# Patient Record
Sex: MALE | Race: WHITE | NOT HISPANIC OR LATINO | Employment: OTHER | URBAN - METROPOLITAN AREA
[De-identification: names, ages, dates, MRNs, and addresses within clinical notes are randomized per-mention and may not be internally consistent; named-entity substitution may affect disease eponyms.]

---

## 2017-03-08 ENCOUNTER — HOSPITAL ENCOUNTER (OUTPATIENT)
Dept: NON INVASIVE DIAGNOSTICS | Facility: CLINIC | Age: 72
Discharge: HOME | End: 2017-03-08
Payer: COMMERCIAL

## 2017-03-08 DIAGNOSIS — I10 ESSENTIAL (PRIMARY) HYPERTENSION: ICD-10-CM

## 2017-03-08 DIAGNOSIS — I35.9 NONRHEUMATIC AORTIC VALVE DISORDER: ICD-10-CM

## 2017-03-08 DIAGNOSIS — I48.91 ATRIAL FIBRILLATION (HCC): ICD-10-CM

## 2017-03-08 DIAGNOSIS — E78.5 HYPERLIPIDEMIA: ICD-10-CM

## 2017-03-08 PROCEDURE — 93306 TTE W/DOPPLER COMPLETE: CPT

## 2017-03-29 DIAGNOSIS — I42.9 CARDIOMYOPATHY (HCC): ICD-10-CM

## 2017-03-29 DIAGNOSIS — E78.5 HYPERLIPIDEMIA: ICD-10-CM

## 2017-04-12 ENCOUNTER — TRANSCRIBE ORDERS (OUTPATIENT)
Dept: LAB | Facility: HOSPITAL | Age: 72
End: 2017-04-12

## 2017-04-12 ENCOUNTER — APPOINTMENT (OUTPATIENT)
Dept: LAB | Facility: HOSPITAL | Age: 72
End: 2017-04-12
Attending: INTERNAL MEDICINE
Payer: COMMERCIAL

## 2017-04-12 DIAGNOSIS — E78.5 HYPERLIPIDEMIA: ICD-10-CM

## 2017-04-12 DIAGNOSIS — I42.9 CARDIOMYOPATHY (HCC): ICD-10-CM

## 2017-04-12 LAB
ALBUMIN SERPL BCP-MCNC: 3.5 G/DL (ref 3.5–5)
ALP SERPL-CCNC: 60 U/L (ref 46–116)
ALT SERPL W P-5'-P-CCNC: 25 U/L (ref 12–78)
AST SERPL W P-5'-P-CCNC: 16 U/L (ref 5–45)
BILIRUB DIRECT SERPL-MCNC: 0.16 MG/DL (ref 0–0.2)
BILIRUB SERPL-MCNC: 0.55 MG/DL (ref 0.2–1)
CHOLEST SERPL-MCNC: 208 MG/DL (ref 50–200)
HDLC SERPL-MCNC: 63 MG/DL (ref 40–60)
LDLC SERPL CALC-MCNC: 123 MG/DL (ref 0–100)
PROT SERPL-MCNC: 7 G/DL (ref 6.4–8.2)
TRIGL SERPL-MCNC: 112 MG/DL

## 2017-04-12 PROCEDURE — 36415 COLL VENOUS BLD VENIPUNCTURE: CPT

## 2017-04-12 PROCEDURE — 80061 LIPID PANEL: CPT

## 2017-04-12 PROCEDURE — 80076 HEPATIC FUNCTION PANEL: CPT

## 2017-05-01 ENCOUNTER — ALLSCRIPTS OFFICE VISIT (OUTPATIENT)
Dept: OTHER | Facility: OTHER | Age: 72
End: 2017-05-01

## 2017-05-01 ENCOUNTER — TRANSCRIBE ORDERS (OUTPATIENT)
Dept: ADMINISTRATIVE | Facility: HOSPITAL | Age: 72
End: 2017-05-01

## 2017-05-01 DIAGNOSIS — I34.9 NONRHEUMATIC MITRAL VALVE DISORDER: Primary | ICD-10-CM

## 2017-05-01 DIAGNOSIS — I42.9 FAMILIAL CARDIOMYOPATHY (HCC): ICD-10-CM

## 2018-01-12 VITALS
HEIGHT: 66 IN | SYSTOLIC BLOOD PRESSURE: 124 MMHG | WEIGHT: 165.44 LBS | BODY MASS INDEX: 26.59 KG/M2 | HEART RATE: 67 BPM | DIASTOLIC BLOOD PRESSURE: 82 MMHG

## 2018-01-31 DIAGNOSIS — I15.9 SECONDARY HYPERTENSION: Primary | ICD-10-CM

## 2018-01-31 RX ORDER — AMLODIPINE BESYLATE 10 MG/1
10 TABLET ORAL 2 TIMES DAILY
Qty: 180 TABLET | Refills: 3 | Status: SHIPPED | OUTPATIENT
Start: 2018-01-31 | End: 2018-11-29 | Stop reason: SDUPTHER

## 2018-01-31 RX ORDER — AMLODIPINE BESYLATE 10 MG/1
1 TABLET ORAL 2 TIMES DAILY
COMMUNITY
Start: 2016-02-16 | End: 2018-01-31 | Stop reason: SDUPTHER

## 2018-02-13 ENCOUNTER — OFFICE VISIT (OUTPATIENT)
Dept: FAMILY MEDICINE CLINIC | Facility: CLINIC | Age: 73
End: 2018-02-13
Payer: COMMERCIAL

## 2018-02-13 VITALS
WEIGHT: 166 LBS | BODY MASS INDEX: 27.66 KG/M2 | HEIGHT: 65 IN | DIASTOLIC BLOOD PRESSURE: 72 MMHG | SYSTOLIC BLOOD PRESSURE: 126 MMHG | HEART RATE: 83 BPM | TEMPERATURE: 97.7 F | OXYGEN SATURATION: 98 %

## 2018-02-13 DIAGNOSIS — Z00.00 ENCOUNTER FOR PREVENTIVE HEALTH EXAMINATION: Primary | ICD-10-CM

## 2018-02-13 DIAGNOSIS — E55.9 VITAMIN D DEFICIENCY: ICD-10-CM

## 2018-02-13 DIAGNOSIS — I48.20 CHRONIC ATRIAL FIBRILLATION (HCC): ICD-10-CM

## 2018-02-13 DIAGNOSIS — N42.9 ABNORMAL PROSTATE BY PALPATION: ICD-10-CM

## 2018-02-13 PROCEDURE — G0438 PPPS, INITIAL VISIT: HCPCS | Performed by: FAMILY MEDICINE

## 2018-02-13 RX ORDER — ATENOLOL 25 MG/1
TABLET ORAL
COMMUNITY
Start: 2017-01-03 | End: 2018-03-19 | Stop reason: SDUPTHER

## 2018-02-13 RX ORDER — NIACIN 500 MG
1 TABLET ORAL DAILY
COMMUNITY
End: 2019-04-01 | Stop reason: ALTCHOICE

## 2018-02-13 RX ORDER — CLONIDINE HYDROCHLORIDE 0.1 MG/1
1 TABLET ORAL 2 TIMES DAILY
COMMUNITY
Start: 2017-01-27 | End: 2019-01-22 | Stop reason: SDUPTHER

## 2018-02-13 RX ORDER — ATENOLOL 100 MG/1
TABLET ORAL
Refills: 0 | COMMUNITY
Start: 2018-01-12 | End: 2018-04-10 | Stop reason: SDUPTHER

## 2018-02-13 NOTE — PROGRESS NOTES
Assessment/Plan: new patient here today to establish care     No problem-specific Assessment & Plan notes found for this encounter  1  Encounter for preventive health examination  PSA, total and free   2  Abnormal prostate by palpation  Ambulatory referral to Urology    PSA, total and free   3  Vitamin D deficiency  Vitamin D 25 hydroxy   4  Chronic atrial fibrillation (HCC)          There are no diagnoses linked to this encounter  Subjective:      Patient ID: Kim Huber is a 81 y o  male  First visit  Follows with Cardiology for A-Fib  Had [facility] ago [facility], repeat in 10 years  Due for prostate exam         The following portions of the patient's history were reviewed and updated as appropriate: allergies, current medications, past family history, past medical history, past social history, past surgical history and problem list     Review of Systems   Constitutional: Negative  HENT: Negative  Eyes: Negative  Respiratory: Negative  Cardiovascular: Negative  Gastrointestinal: Negative  Genitourinary: Negative  Musculoskeletal: Negative  Skin: Negative  Neurological: Negative  Psychiatric/Behavioral: Negative  Objective:    Vitals:    02/13/18 0812   BP: 126/72   Pulse: 83   Temp: 97 7 °F (36 5 °C)   SpO2: 98%        Physical Exam   Constitutional: He appears well-developed and well-nourished  HENT:   Head: Normocephalic and atraumatic  Eyes: Conjunctivae are normal  Pupils are equal, round, and reactive to light  Neck: Normal range of motion  Cardiovascular: Normal rate, normal heart sounds and intact distal pulses  Irregular - irregularity  Pulmonary/Chest: Effort normal and breath sounds normal    Abdominal: Soft  Bowel sounds are normal    Genitourinary:   Genitourinary Comments: Prostate larger on right side and slight firm  Skin: Skin is warm and dry  Psychiatric: He has a normal mood and affect   His behavior is normal  Judgment and thought content normal

## 2018-02-15 ENCOUNTER — TELEPHONE (OUTPATIENT)
Dept: CARDIOLOGY CLINIC | Facility: CLINIC | Age: 73
End: 2018-02-15

## 2018-02-15 DIAGNOSIS — E78.5 HYPERLIPIDEMIA, UNSPECIFIED HYPERLIPIDEMIA TYPE: Primary | ICD-10-CM

## 2018-02-15 NOTE — TELEPHONE ENCOUNTER
Gemma Hous[place] called to say that he is getting blood work done for his PCP and he noticed that a Lipid Panel was not ordered  He wanted to know if you would like to order a lipid panel prior to his appt with you on 3/6/18?

## 2018-02-22 ENCOUNTER — APPOINTMENT (OUTPATIENT)
Dept: LAB | Facility: HOSPITAL | Age: 73
End: 2018-02-22
Payer: COMMERCIAL

## 2018-02-22 DIAGNOSIS — E78.5 HYPERLIPIDEMIA, UNSPECIFIED HYPERLIPIDEMIA TYPE: ICD-10-CM

## 2018-02-22 DIAGNOSIS — E55.9 VITAMIN D DEFICIENCY: ICD-10-CM

## 2018-02-22 DIAGNOSIS — N42.9 ABNORMAL PROSTATE BY PALPATION: ICD-10-CM

## 2018-02-22 DIAGNOSIS — Z00.00 ENCOUNTER FOR PREVENTIVE HEALTH EXAMINATION: ICD-10-CM

## 2018-02-22 LAB
25(OH)D3 SERPL-MCNC: 20.5 NG/ML (ref 30–100)
CHOLEST SERPL-MCNC: 199 MG/DL (ref 50–200)
HDLC SERPL-MCNC: 70 MG/DL (ref 40–60)
LDLC SERPL CALC-MCNC: 112 MG/DL (ref 0–100)
TRIGL SERPL-MCNC: 85 MG/DL

## 2018-02-22 PROCEDURE — 84154 ASSAY OF PSA FREE: CPT

## 2018-02-22 PROCEDURE — 84153 ASSAY OF PSA TOTAL: CPT

## 2018-02-22 PROCEDURE — 82306 VITAMIN D 25 HYDROXY: CPT

## 2018-02-22 PROCEDURE — 80061 LIPID PANEL: CPT

## 2018-02-22 PROCEDURE — 36415 COLL VENOUS BLD VENIPUNCTURE: CPT

## 2018-02-23 LAB
PSA FREE MFR SERPL: 38.6 %
PSA FREE SERPL-MCNC: 0.85 NG/ML
PSA SERPL-MCNC: 2.2 NG/ML (ref 0–4)

## 2018-03-01 DIAGNOSIS — I35.9 NONRHEUMATIC AORTIC VALVE DISORDER: ICD-10-CM

## 2018-03-01 DIAGNOSIS — I10 ESSENTIAL (PRIMARY) HYPERTENSION: ICD-10-CM

## 2018-03-01 DIAGNOSIS — I48.91 ATRIAL FIBRILLATION (HCC): ICD-10-CM

## 2018-03-01 DIAGNOSIS — E78.5 HYPERLIPIDEMIA: ICD-10-CM

## 2018-03-06 ENCOUNTER — OFFICE VISIT (OUTPATIENT)
Dept: UROLOGY | Facility: CLINIC | Age: 73
End: 2018-03-06
Payer: COMMERCIAL

## 2018-03-06 VITALS
BODY MASS INDEX: 27.49 KG/M2 | HEART RATE: 75 BPM | HEIGHT: 65 IN | DIASTOLIC BLOOD PRESSURE: 73 MMHG | WEIGHT: 165 LBS | SYSTOLIC BLOOD PRESSURE: 120 MMHG

## 2018-03-06 DIAGNOSIS — N42.9 ABNORMAL PROSTATE ON PHYSICAL EXAMINATION: Primary | ICD-10-CM

## 2018-03-06 DIAGNOSIS — D40.0 NEOPLASM OF UNCERTAIN BEHAVIOR OF PROSTATE: ICD-10-CM

## 2018-03-06 LAB
SL AMB  POCT GLUCOSE, UA: ABNORMAL
SL AMB LEUKOCYTE ESTERASE,UA: ABNORMAL
SL AMB POCT BLOOD,UA: ABNORMAL
SL AMB POCT CLARITY,UA: CLEAR
SL AMB POCT COLOR,UA: YELLOW
SL AMB POCT KETONES,UA: ABNORMAL
SL AMB POCT NITRITE,UA: ABNORMAL
SL AMB POCT PH,UA: 5
SL AMB POCT URINE PROTEIN: ABNORMAL

## 2018-03-06 PROCEDURE — 81002 URINALYSIS NONAUTO W/O SCOPE: CPT | Performed by: UROLOGY

## 2018-03-06 PROCEDURE — 99203 OFFICE O/P NEW LOW 30 MIN: CPT | Performed by: UROLOGY

## 2018-03-06 NOTE — LETTER
March 6, 2018     Olga Pichardo DO  [address]    Patient: Danyelle Dang   YOB: 1945   Date of Visit: 3/6/2018       Dear Dr Kaylah Pinedo: Thank you for referring Mayco Cadena to me for evaluation  Below are my notes for this consultation  If you have questions, please do not hesitate to call me  I look forward to following your patient along with you  Sincerely,        Valentina Trevino MD        CC: No Recipients  Valentina Trevino MD  3/6/2018  1:12 PM  Sign at close encounter  Progress Note - Urology  Danyelle Dang 81 y o  male MRN: [id number]  Encounter: [id number]      Chief Complaint:   Chief Complaint   Patient presents with    Other     New Patient- Abnormal Prostate Exam w/ PCP       HPI:    77-year-old male who presents for evaluation of an abnormal prostate examination  He gives no family history of prostate disease  His current AUA index is 5  Most recent PSA is 2 2  His major issue in regard to lower tract symptoms is that of nocturia 2-3 times  He feels empty postvoid  He has had no UTI history  He has no bone pain  He has no weight loss  MEDS:    Current Outpatient Prescriptions:     amLODIPine (NORVASC) 10 mg tablet, Take 1 tablet (10 mg total) by mouth 2 (two) times a day, Disp: 180 tablet, Rfl: 3    Ascorbic Acid (VITAMIN C) 500 MG/5ML LIQD, Take 1 tablet by mouth daily, Disp: , Rfl:     atenolol (TENORMIN) 25 mg tablet, Take by mouth, Disp: , Rfl:     cloNIDine (CATAPRES) 0 1 mg tablet, Take 1 tablet by mouth 2 (two) times a day, Disp: , Rfl:     niacin 500 mg tablet, Take 1 tablet by mouth daily, Disp: , Rfl:     Omega-3 Fatty Acids (FISH OIL) 645 MG CAPS, Take 1 capsule by mouth daily, Disp: , Rfl:     PRADAXA 150 MG capsu, , Disp: , Rfl: 0      PMH:  Past Medical History:   Diagnosis Date    Atrial fibrillation (HCC)          ROS:  Review of Systems   Constitutional: Negative  Respiratory: Negative      Cardiovascular: Negative  Neurological: Negative  Psychiatric/Behavioral: Negative  Vitals:  Blood pressure 120/73, pulse 75, height 5' 5" (1 651 m), weight 74 8 kg (165 lb)  Physical Exam    This is a well-developed male appearing his stated age in no acute distress  There is no adenopathy noted  The back reveals no CVA tenderness  The abdomen is soft  There is no   Hepatomegaly  There is no splenomegaly  There is no abdominal mass  No tenderness  The bladder is not distended  There is no inguinal adenopathy  There is no hernia defect  The penis appears to be normal   Both testes are unremarkable  Rectal examination shows the prostate to be roughly 28 g  The right side is firm  The left side is otherwise unremarkable  Lower extremities show +1 edema to the knees    Lab, Imaging and other studies:  Recent Results (from the past 48 hour(s))   POCT urine dip    Collection Time: 03/06/18 11:05 AM   Result Value Ref Range    LEUKOCYTE ESTERASE,UA NEG      NITRITE,UA NEG     SL AMB POCT URINE PROTEIN +      PH,UA 5      BLOOD,UA NEG      KETONES,UA TRACE     GLUCOSE, UA NEG      COLOR,UA YELLOW      CLARITY,UA CLEAR          IMPRESSION:   abnormal digital rectal examination    PLAN:   options were reviewed with the patient  The 1st option is to simply recheck in 4-6 months  This would be a PSA and a digital rectal examination  The 2nd option is to proceed with biopsy and ultrasound  I did review the risk and complications with this including infection and bleeding  Possible urinary retention  Concern that he would have to be off Pradaxa for several days  The 3rd option is to obtain a multiparametric MRI and review this study prior to making a decision  The patient has decided to proceed with the recommended multiparametric MRI

## 2018-03-06 NOTE — PROGRESS NOTES
Progress Note - Urology  Nicho Michaels 81 y o  male MRN: [id number]  Encounter: [id number]      Chief Complaint:   Chief Complaint   Patient presents with    Other     New Patient- Abnormal Prostate Exam w/ PCP       HPI:    79-year-old male who presents for evaluation of an abnormal prostate examination  He gives no family history of prostate disease  His current AUA index is 5  Most recent PSA is 2 2  His major issue in regard to lower tract symptoms is that of nocturia 2-3 times  He feels empty postvoid  He has had no UTI history  He has no bone pain  He has no weight loss  MEDS:    Current Outpatient Prescriptions:     amLODIPine (NORVASC) 10 mg tablet, Take 1 tablet (10 mg total) by mouth 2 (two) times a day, Disp: 180 tablet, Rfl: 3    Ascorbic Acid (VITAMIN C) 500 MG/5ML LIQD, Take 1 tablet by mouth daily, Disp: , Rfl:     atenolol (TENORMIN) 25 mg tablet, Take by mouth, Disp: , Rfl:     cloNIDine (CATAPRES) 0 1 mg tablet, Take 1 tablet by mouth 2 (two) times a day, Disp: , Rfl:     niacin 500 mg tablet, Take 1 tablet by mouth daily, Disp: , Rfl:     Omega-3 Fatty Acids (FISH OIL) 645 MG CAPS, Take 1 capsule by mouth daily, Disp: , Rfl:     PRADAXA 150 MG capsu, , Disp: , Rfl: 0      PMH:  Past Medical History:   Diagnosis Date    Atrial fibrillation (HCC)          ROS:  Review of Systems   Constitutional: Negative  Respiratory: Negative  Cardiovascular: Negative  Neurological: Negative  Psychiatric/Behavioral: Negative  Vitals:  Blood pressure 120/73, pulse 75, height 5' 5" (1 651 m), weight 74 8 kg (165 lb)  Physical Exam    This is a well-developed male appearing his stated age in no acute distress  There is no adenopathy noted  The back reveals no CVA tenderness  The abdomen is soft  There is no   Hepatomegaly  There is no splenomegaly  There is no abdominal mass  No tenderness  The bladder is not distended  There is no inguinal adenopathy    There is no hernia defect  The penis appears to be normal   Both testes are unremarkable  Rectal examination shows the prostate to be roughly 28 g  The right side is firm  The left side is otherwise unremarkable  Lower extremities show +1 edema to the knees    Lab, Imaging and other studies:  Recent Results (from the past 48 hour(s))   POCT urine dip    Collection Time: 03/06/18 11:05 AM   Result Value Ref Range    LEUKOCYTE ESTERASE,UA NEG      NITRITE,UA NEG     SL AMB POCT URINE PROTEIN +      PH,UA 5      BLOOD,UA NEG      KETONES,UA TRACE     GLUCOSE, UA NEG      COLOR,UA YELLOW      CLARITY,UA CLEAR          IMPRESSION:   abnormal digital rectal examination    PLAN:   options were reviewed with the patient  The 1st option is to simply recheck in 4-6 months  This would be a PSA and a digital rectal examination  The 2nd option is to proceed with biopsy and ultrasound  I did review the risk and complications with this including infection and bleeding  Possible urinary retention  Concern that he would have to be off Pradaxa for several days  The 3rd option is to obtain a multiparametric MRI and review this study prior to making a decision  The patient has decided to proceed with the recommended multiparametric MRI

## 2018-03-13 ENCOUNTER — LAB (OUTPATIENT)
Dept: LAB | Facility: HOSPITAL | Age: 73
End: 2018-03-13
Attending: UROLOGY
Payer: COMMERCIAL

## 2018-03-13 DIAGNOSIS — D40.0 NEOPLASM OF UNCERTAIN BEHAVIOR OF PROSTATE: ICD-10-CM

## 2018-03-13 LAB
ANION GAP SERPL CALCULATED.3IONS-SCNC: 8 MMOL/L (ref 4–13)
BUN SERPL-MCNC: 28 MG/DL (ref 5–25)
CALCIUM SERPL-MCNC: 9.1 MG/DL (ref 8.3–10.1)
CHLORIDE SERPL-SCNC: 111 MMOL/L (ref 100–108)
CO2 SERPL-SCNC: 21 MMOL/L (ref 21–32)
CREAT SERPL-MCNC: 1.79 MG/DL (ref 0.6–1.3)
GFR SERPL CREATININE-BSD FRML MDRD: 38 ML/MIN/1.73SQ M
GLUCOSE SERPL-MCNC: 80 MG/DL (ref 65–140)
POTASSIUM SERPL-SCNC: 4.4 MMOL/L (ref 3.5–5.3)
SODIUM SERPL-SCNC: 140 MMOL/L (ref 136–145)

## 2018-03-13 PROCEDURE — 36415 COLL VENOUS BLD VENIPUNCTURE: CPT

## 2018-03-13 PROCEDURE — 80048 BASIC METABOLIC PNL TOTAL CA: CPT

## 2018-03-19 ENCOUNTER — HOSPITAL ENCOUNTER (OUTPATIENT)
Dept: RADIOLOGY | Facility: HOSPITAL | Age: 73
Discharge: HOME | End: 2018-03-19
Attending: UROLOGY
Payer: COMMERCIAL

## 2018-03-19 DIAGNOSIS — I10 ESSENTIAL (PRIMARY) HYPERTENSION: Primary | ICD-10-CM

## 2018-03-19 DIAGNOSIS — N42.9 ABNORMAL PROSTATE ON PHYSICAL EXAMINATION: ICD-10-CM

## 2018-03-19 DIAGNOSIS — D40.0 NEOPLASM OF UNCERTAIN BEHAVIOR OF PROSTATE: ICD-10-CM

## 2018-03-19 PROCEDURE — 72197 MRI PELVIS W/O & W/DYE: CPT

## 2018-03-19 PROCEDURE — 76377 3D RENDER W/INTRP POSTPROCES: CPT

## 2018-03-19 PROCEDURE — A9585 GADOBUTROL INJECTION: HCPCS | Performed by: UROLOGY

## 2018-03-19 RX ORDER — ATENOLOL 25 MG/1
TABLET ORAL
Qty: 270 TABLET | Refills: 3 | Status: SHIPPED | OUTPATIENT
Start: 2018-03-19 | End: 2019-03-20 | Stop reason: SDUPTHER

## 2018-03-19 RX ADMIN — GADOBUTROL 8 ML: 604.72 INJECTION INTRAVENOUS at 13:26

## 2018-03-26 ENCOUNTER — HOSPITAL ENCOUNTER (OUTPATIENT)
Dept: NON INVASIVE DIAGNOSTICS | Facility: CLINIC | Age: 73
Discharge: HOME | End: 2018-03-26
Payer: COMMERCIAL

## 2018-03-26 DIAGNOSIS — I48.91 ATRIAL FIBRILLATION (HCC): ICD-10-CM

## 2018-03-26 DIAGNOSIS — I10 ESSENTIAL (PRIMARY) HYPERTENSION: ICD-10-CM

## 2018-03-26 DIAGNOSIS — E78.5 HYPERLIPIDEMIA: ICD-10-CM

## 2018-03-26 DIAGNOSIS — I35.9 NONRHEUMATIC AORTIC VALVE DISORDER: ICD-10-CM

## 2018-03-26 PROCEDURE — 93306 TTE W/DOPPLER COMPLETE: CPT

## 2018-03-26 PROCEDURE — 93306 TTE W/DOPPLER COMPLETE: CPT | Performed by: INTERNAL MEDICINE

## 2018-03-28 ENCOUNTER — TELEPHONE (OUTPATIENT)
Dept: UROLOGY | Facility: CLINIC | Age: 73
End: 2018-03-28

## 2018-03-28 NOTE — LETTER
March 28, 2018     Omar Miltonadolph [address] [employer] [address] [place] [address]    Patient: Adolfo Rashid   YOB: 1945   Date of Visit: 3/28/2018       Dear Dr Chayito Lyons: Thank you for referring Mono Rosado to me for evaluation  Below are my notes for this consultation  If you have questions, please do not hesitate to call me  I look forward to following your patient along with you  Sincerely,        Emeli Dickinson MD        CC: No Recipients  Emeli Dickinson MD  3/28/2018  9:56 AM  Sign at exiting of workspace    Spoke to Mr  Eric Caballero regarding the MRI of the prostate showing a pirads 3 lesion  We discussed options of prostate biopsy  We discussed conservative management and rechecking the exam and PSA and possibly repeating the MRI in 6 months  He wishes to proceed in that fashion

## 2018-03-28 NOTE — TELEPHONE ENCOUNTER
Spoke to Mr  Juana Hood regarding the MRI of the prostate showing a pirads 3 lesion  We discussed options of prostate biopsy  We discussed conservative management and rechecking the exam and PSA and possibly repeating the MRI in 6 months  He wishes to proceed in that fashion

## 2018-04-10 DIAGNOSIS — I48.91 ATRIAL FIBRILLATION, UNSPECIFIED TYPE (HCC): Primary | ICD-10-CM

## 2018-04-10 RX ORDER — ATENOLOL 100 MG/1
TABLET ORAL
Qty: 180 CAPSULE | Refills: 3 | Status: SHIPPED | OUTPATIENT
Start: 2018-04-10 | End: 2019-04-03 | Stop reason: SDUPTHER

## 2018-04-19 ENCOUNTER — OFFICE VISIT (OUTPATIENT)
Dept: CARDIOLOGY CLINIC | Facility: CLINIC | Age: 73
End: 2018-04-19
Payer: COMMERCIAL

## 2018-04-19 VITALS
DIASTOLIC BLOOD PRESSURE: 68 MMHG | WEIGHT: 166 LBS | BODY MASS INDEX: 27.66 KG/M2 | SYSTOLIC BLOOD PRESSURE: 110 MMHG | HEART RATE: 68 BPM | HEIGHT: 65 IN

## 2018-04-19 DIAGNOSIS — E78.00 PURE HYPERCHOLESTEROLEMIA: Primary | ICD-10-CM

## 2018-04-19 DIAGNOSIS — I35.9 AORTIC VALVE DISEASE: ICD-10-CM

## 2018-04-19 DIAGNOSIS — I10 ESSENTIAL (PRIMARY) HYPERTENSION: ICD-10-CM

## 2018-04-19 DIAGNOSIS — I48.91 ATRIAL FIBRILLATION, UNSPECIFIED TYPE (HCC): ICD-10-CM

## 2018-04-19 PROCEDURE — 99214 OFFICE O/P EST MOD 30 MIN: CPT | Performed by: INTERNAL MEDICINE

## 2018-04-19 PROCEDURE — 93000 ELECTROCARDIOGRAM COMPLETE: CPT | Performed by: INTERNAL MEDICINE

## 2018-04-19 NOTE — PATIENT INSTRUCTIONS
I will continue the patient's present medical regimen  The patient appears well compensated  I have asked the patient to call if there is a problem in the interim otherwise I will see the patient in one years time  Patient is due for an echocardiogram prior to the next visit to assess LV wall thickness and systolic function

## 2018-04-19 NOTE — PROGRESS NOTES
Cardiology Follow Up    [facility]  1945  [id number]  [address] [facility]  [address]  [address]    1  Pure hypercholesterolemia     2  Essential (primary) hypertension     3  Atrial fibrillation, unspecified type ([address] )     4  Aortic valve disease         Interval History:  Patient is here for a follow-up visit  He was last seen by me in May 2017  His most recent echocardiogram was done March of this year which demonstrated preserved LV systolic function with moderate biatrial cavity enlargement and moderate mitral regurgitation  He has a known bicuspid aortic valve with mild mixed disease noted  He is well had moderate tricuspid regurgitation with mild to moderate elevation in PA systolic pressure and a mildly dilated ascending aorta with no significant change overall in his echocardiogram compared to a prior study done March 2017  He had a cholesterol profile done in February 2018 which looked improved compared to a prior study done last year  His total cholesterol is 199 with an LDL of 112 and HDL of 70  He has been feeling well  He has had no chest pain or significant dyspnea  There is no problem list on file for this patient      Past Medical History:   Diagnosis Date    Atrial fibrillation [facility])      Social History     Social History    Marital status: Single     Spouse name: N/A    Number of children: N/A    Years of education: N/A     Occupational History    Construction      Social History Main Topics    Smoking status: Never Smoker    Smokeless tobacco: Never Used    Alcohol use 2 4 oz/week     4 Shots of liquor per week    Drug use: No    Sexual activity: Not on file     Other Topics Concern    Not on file     Social History Narrative    No narrative on file      Family History   Problem Relation Age of Onset    No Known Problems Mother     No Known Problems Father      No past surgical history on file  Current Outpatient Prescriptions:     amLODIPine (NORVASC) 10 mg tablet, Take 1 tablet (10 mg total) by mouth 2 (two) times a day, Disp: 180 tablet, Rfl: 3    Ascorbic Acid (VITAMIN C) 500 MG/5ML LIQD, Take 1 tablet by mouth daily, Disp: , Rfl:     atenolol (TENORMIN) 25 mg tablet, TAKE 3 TABLETS BY MOUTH DAILY, (2 TABLETS IN THE MORNING AND 1 TABLET IN THE EVENING), Disp: 270 tablet, Rfl: 3    cloNIDine (CATAPRES) 0 1 mg tablet, Take 1 tablet by mouth 2 (two) times a day, Disp: , Rfl:     niacin 500 mg tablet, Take 1 tablet by mouth daily, Disp: , Rfl:     Omega-3 Fatty Acids (FISH OIL) 645 MG CAPS, Take 1 capsule by mouth daily, Disp: , Rfl:     PRADAXA 150 MG capsu, TAKE 1 CAPSULE TWICE DAILY  , Disp: 180 capsule, Rfl: 3  No Known Allergies    Labs:not applicable  Imaging: No results found  Review of Systems:  Review of Systems   All other systems reviewed and are negative  Physical Exam:  Physical Exam   Constitutional: He is oriented to person, place, and time  He appears well-developed and well-nourished  HENT:   Head: Normocephalic and atraumatic  Eyes: Conjunctivae are normal  Pupils are equal, round, and reactive to light  Neck: Normal range of motion  Neck supple  Cardiovascular: Normal rate  Murmur heard  Pulmonary/Chest: Effort normal and breath sounds normal    Neurological: He is alert and oriented to person, place, and time  Skin: Skin is warm and dry  Psychiatric: He has a normal mood and affect  Vitals reviewed  Discussion/Summary:I will continue the patient's present medical regimen  The patient appears well compensated  I have asked the patient to call if there is a problem in the interim otherwise I will see the patient in one years time  Patient is due for an echocardiogram prior to the next visit to assess LV wall thickness and systolic function

## 2018-05-14 ENCOUNTER — OFFICE VISIT (OUTPATIENT)
Dept: FAMILY MEDICINE CLINIC | Facility: CLINIC | Age: 73
End: 2018-05-14
Payer: COMMERCIAL

## 2018-05-14 VITALS
TEMPERATURE: 97.8 F | BODY MASS INDEX: 27.96 KG/M2 | OXYGEN SATURATION: 96 % | DIASTOLIC BLOOD PRESSURE: 76 MMHG | HEART RATE: 67 BPM | WEIGHT: 167.8 LBS | HEIGHT: 65 IN | SYSTOLIC BLOOD PRESSURE: 114 MMHG

## 2018-05-14 DIAGNOSIS — M25.562 CHRONIC PAIN OF BOTH KNEES: ICD-10-CM

## 2018-05-14 DIAGNOSIS — I48.0 PAROXYSMAL ATRIAL FIBRILLATION (HCC): ICD-10-CM

## 2018-05-14 DIAGNOSIS — N42.9 ABNORMAL PROSTATE ON PHYSICAL EXAMINATION: ICD-10-CM

## 2018-05-14 DIAGNOSIS — G89.29 CHRONIC PAIN OF BOTH KNEES: ICD-10-CM

## 2018-05-14 DIAGNOSIS — N18.30 STAGE 3 CHRONIC KIDNEY DISEASE (HCC): ICD-10-CM

## 2018-05-14 DIAGNOSIS — R94.4 DECREASED GFR: ICD-10-CM

## 2018-05-14 DIAGNOSIS — R79.89 ELEVATED SERUM CREATININE: ICD-10-CM

## 2018-05-14 DIAGNOSIS — M25.561 CHRONIC PAIN OF BOTH KNEES: ICD-10-CM

## 2018-05-14 DIAGNOSIS — I10 ESSENTIAL HYPERTENSION: Primary | ICD-10-CM

## 2018-05-14 PROCEDURE — 99215 OFFICE O/P EST HI 40 MIN: CPT | Performed by: FAMILY MEDICINE

## 2018-05-14 PROCEDURE — 1101F PT FALLS ASSESS-DOCD LE1/YR: CPT | Performed by: FAMILY MEDICINE

## 2018-05-14 PROCEDURE — 3725F SCREEN DEPRESSION PERFORMED: CPT | Performed by: FAMILY MEDICINE

## 2018-05-14 NOTE — PATIENT INSTRUCTIONS
Reviewed labs and discussed with Patient  Nephrology evaluation - discussed renal function  Knee pain - at the point patient not to do aggravating activities  Heart rate is good, along with BP control  Hydration with water    Patient will be following up with Urology for the abnormal prostate exam

## 2018-05-14 NOTE — PROGRESS NOTES
Assessment/Plan: patient here today for follow up for hypertension and review BW    No problem-specific Assessment & Plan notes found for this encounter  1  Essential hypertension     2  Elevated serum creatinine  Ambulatory referral to Nephrology    CANCELED: Ambulatory referral to Nephrology   3  Decreased GFR  Ambulatory referral to Nephrology    CANCELED: Ambulatory referral to Nephrology   4  Paroxysmal atrial fibrillation (HCC)     5  Stage 3 chronic kidney disease     6  Chronic pain of both knees     7  Abnormal prostate on physical examination            There are no diagnoses linked to this encounter  Subjective:      Patient ID: Yvonne Sequeira is a 72 y o  male  Follow up  Follows with Urology for abnormal prostate exam   History of arrhythmia and HTN  Review labs, elevated Creat and decreased GFR  The following portions of the patient's history were reviewed and updated as appropriate: allergies, current medications, past family history, past medical history, past social history, past surgical history and problem list     Review of Systems   Constitutional: Negative  HENT: Negative  Respiratory: Negative  Cardiovascular: Negative  Gastrointestinal: Negative  Genitourinary: Negative  Musculoskeletal: Positive for arthralgias  Kness and back discomfort  Limits too much walking  Skin: Negative  Neurological: Negative  Psychiatric/Behavioral: Negative  Objective:      /76 (BP Location: Left arm, Patient Position: Sitting, Cuff Size: Standard)   Pulse 67   Temp 97 8 °F (36 6 °C) (Oral)   Ht 5' 5" (1 651 m)   Wt 76 1 kg (167 lb 12 8 oz)   SpO2 96%   BMI 27 92 kg/m²          Physical Exam   Constitutional: He is oriented to person, place, and time  He appears well-developed and well-nourished  HENT:   Head: Normocephalic and atraumatic     Nose: Nose normal    Mouth/Throat: Oropharynx is clear and moist    Eyes: Conjunctivae are normal  Pupils are equal, round, and reactive to light  Cardiovascular: Normal rate and normal heart sounds  regular with irregularity  Pulmonary/Chest: Effort normal and breath sounds normal    Abdominal: Soft  Bowel sounds are normal    Neurological: He is alert and oriented to person, place, and time  Skin: Skin is warm and dry  Psychiatric: He has a normal mood and affect   His behavior is normal  Judgment and thought content normal

## 2018-07-03 ENCOUNTER — OFFICE VISIT (OUTPATIENT)
Dept: NEPHROLOGY | Facility: CLINIC | Age: 73
End: 2018-07-03
Payer: COMMERCIAL

## 2018-07-03 VITALS
SYSTOLIC BLOOD PRESSURE: 117 MMHG | WEIGHT: 162.4 LBS | DIASTOLIC BLOOD PRESSURE: 72 MMHG | HEART RATE: 90 BPM | HEIGHT: 65 IN | BODY MASS INDEX: 27.06 KG/M2

## 2018-07-03 DIAGNOSIS — E87.8 LOW BICARBONATE: ICD-10-CM

## 2018-07-03 DIAGNOSIS — N18.30 STAGE 3 CHRONIC KIDNEY DISEASE (HCC): Primary | ICD-10-CM

## 2018-07-03 DIAGNOSIS — N18.30 BENIGN HYPERTENSION WITH CKD (CHRONIC KIDNEY DISEASE) STAGE III (HCC): ICD-10-CM

## 2018-07-03 DIAGNOSIS — I12.9 BENIGN HYPERTENSION WITH CKD (CHRONIC KIDNEY DISEASE) STAGE III (HCC): ICD-10-CM

## 2018-07-03 DIAGNOSIS — R94.4 DECREASED GFR: ICD-10-CM

## 2018-07-03 DIAGNOSIS — E55.9 VITAMIN D DEFICIENCY: ICD-10-CM

## 2018-07-03 DIAGNOSIS — R60.0 BILATERAL LEG EDEMA: ICD-10-CM

## 2018-07-03 LAB
SL AMB  POCT GLUCOSE, UA: NEGATIVE
SL AMB LEUKOCYTE ESTERASE,UA: NEGATIVE
SL AMB POCT BILIRUBIN,UA: NEGATIVE
SL AMB POCT BLOOD,UA: ABNORMAL
SL AMB POCT CLARITY,UA: CLEAR
SL AMB POCT COLOR,UA: YELLOW
SL AMB POCT KETONES,UA: NEGATIVE
SL AMB POCT NITRITE,UA: NEGATIVE
SL AMB POCT PH,UA: 6
SL AMB POCT SPECIFIC GRAVITY,UA: 1.02
SL AMB POCT URINE PROTEIN: ABNORMAL
SL AMB POCT UROBILINOGEN: 0.2

## 2018-07-03 PROCEDURE — 81002 URINALYSIS NONAUTO W/O SCOPE: CPT | Performed by: INTERNAL MEDICINE

## 2018-07-03 PROCEDURE — 99204 OFFICE O/P NEW MOD 45 MIN: CPT | Performed by: INTERNAL MEDICINE

## 2018-07-03 RX ORDER — CHOLECALCIFEROL (VITAMIN D3) 50 MCG
2000 TABLET ORAL DAILY
COMMUNITY

## 2018-07-03 NOTE — PATIENT INSTRUCTIONS
Low-Sodium Diet   WHAT YOU NEED TO KNOW:   A low-sodium diet limits foods that are high in sodium (salt)  You will need to follow a low-sodium diet if you have high blood pressure, kidney disease, or heart failure  You may also need to follow this diet if you have a condition that is causing your body to retain (hold) extra fluid  You may need to limit the amount of sodium you eat to 1,500 mg  Ask your healthcare provider how much sodium you can have each day  DISCHARGE INSTRUCTIONS:   How to use food labels to choose foods that are low in sodium:  Read food labels to find the amount of sodium they contain  The amount of sodium is listed in milligrams (mg)  The % Daily Value (DV) column tells you how much of your daily needs are met by 1 serving of the food for each nutrient listed  Choose foods that have less than 5% of the DV of sodium  These foods are considered low in sodium  Foods that have 20% or more of the DV of sodium are considered high in sodium  Some food labels may also list any of the following terms that tell you about the sodium content in the food:  · Sodium-free:  Less than 5 mg in each serving    · Very low sodium:  35 mg of sodium or less in each serving    · Low sodium:  140 mg of sodium or less in each serving    · Reduced sodium: At least 25% less sodium in each serving than the regular type    · Light in sodium:  50% less sodium in each serving    · Unsalted or no added salt:  No extra salt is added during processing (the food may still contain sodium)  Foods to avoid:  Salty foods are high in sodium   You should avoid the following:  · Processed foods:      ¨ Mixes for cornbread, biscuits, cake, and pudding     ¨ Instant foods, such as potatoes, cereals, noodles, and rice     ¨ Packaged foods, such as bread stuffing, rice and pasta mixes, snack dip mixes, and macaroni and cheese     ¨ Canned foods, such as canned vegetables, soups, broths, sauces, and vegetable or tomato juice    ¨ Snack foods, such as salted chips, popcorn, pretzels, pork rinds, salted crackers, and salted nuts    ¨ Frozen foods, such as dinners, entrees, vegetables with sauces, and breaded meats    ¨ Sauerkraut, pickled vegetables, and other foods prepared in brine    · Meats and cheeses:      ¨ Smoked or cured meat, such as corned beef, ibrahim, ham, hot dogs, and sausage    ¨ Canned meats or spreads, such as potted meats, sardines, anchovies, and imitation seafood    ¨ Deli or lunch meats, such as bologna, ham, turkey, and roast beef    ¨ Processed cheese, such as American cheese and cheese spreads    · Condiments, sauces, and seasonings:      ¨ Salt (¼ teaspoon of salt contains 575 mg of sodium)    ¨ Seasonings made with salt, such as garlic salt, celery salt, onion salt, and seasoned salt    ¨ Regular soy sauce, barbecue sauce, teriyaki sauce, steak sauce, Worcestershire sauce, and most flavored vinegars    ¨ Canned gravy and mixes     ¨ Regular condiments, such as mustard, ketchup, and salad dressings    ¨ Pickles and olives    ¨ Meat tenderizers and monosodium glutamate (MSG)  Foods to include:  Read the food label to find the amount of sodium in each serving  · Bread and cereal:  Try to choose breads with less than 80 mg of sodium per serving  ¨ Bread, roll, susan, tortilla, or unsalted crackers  ¨ Ready-to-eat cereals with less than 5% DV of sodium (examples include shredded wheat and puffed rice)    ¨ Pasta    · Vegetables and fruits:      ¨ Unsalted fresh, frozen, or canned vegetables    ¨ Fresh, frozen, or canned fruits    ¨ Fruit juice    · Dairy:  One serving has about 150 mg of sodium  ¨ Milk, all types    ¨ Yogurt    ¨ Hard cheese, such as cheddar, Swiss, [employer], or mozzarella    · Meat and other protein foods:  Some raw meats may have added sodium       ¨ Plain meats, fish, and poultry     ¨ Egg    · Other foods:      ¨ Homemade pudding    ¨ Unsalted nuts, popcorn, or pretzels    ¨ Unsalted butter or margarine  Ways to decrease sodium:   · Add spices and herbs to foods instead of salt during cooking  Use salt-free seasonings to add flavor to foods  Examples include onion powder, garlic powder, basil, matos powder, paprika, and parsley  Try lemon or lime juice or vinegar to give foods a tart flavor  Use hot peppers, pepper, or cayenne pepper to add a spicy flavor to foods  · Do not keep a salt shaker at your kitchen table  This may help keep you from adding salt to food at the table  It may take time to get used to enjoying the natural flavor of food instead of adding salt  Talk to your healthcare provider before you use salt substitutes  Some salt substitutes have a high amount of potassium and need to be avoided if you have kidney disease  · Choose low-sodium foods at restaurants  Meals from restaurants are often high in sodium  Some restaurants have nutrition information on the menu that tells you the amount of sodium in their foods  If possible, ask for your food to be prepared with less, or no salt  · Shop for unsalted or low-sodium foods and snacks at the grocery store  Examples include unsalted or low-sodium broths, soups, and canned vegetables  Choose fresh or frozen vegetables instead  Choose unsalted nuts or seeds or fresh fruits or vegetables as snacks  Read food labels and choose salt-free, very low-sodium, or low-sodium foods  © 2017 [address] Information is for End User's use only and may not be sold, redistributed or otherwise used for commercial purposes  All illustrations and images included in CareNotes® are the copyrighted property of A [employer] M [employer]  or Adan Herzog  The above information is an  only  It is not intended as medical advice for individual conditions or treatments  Talk to your doctor, nurse or pharmacist before following any medical regimen to see if it is safe and effective for you

## 2018-07-03 NOTE — LETTER
July 3, 2018     [facility], DO  [address]    Patient: Atif Maravilla   YOB: 1945   Date of Visit: 7/3/2018       Dear Dr Lazarus Rocker: Thank you for referring Catherine Olson to me for evaluation  Below are my notes for this consultation  If you have questions, please do not hesitate to call me  I look forward to following your patient along with you  Sincerely,        Jim Lopes MD        CC: No Recipients  Jim Lopes MD  7/3/2018  8:52 AM  Sign at close encounter  [address] T Michelle 72 y o  male MRN: [id number]  Date: 7/3/2018  Reason for consultation:   Chief Complaint   Patient presents with    Consult    Chronic Kidney Disease    Hypertension     ASSESSMENT AND PLAN:  Likely CKD stage 3 with baseline creatinine 1 7 going back to 2015  -last creatinine 1 7 in March 2018 overall stable although only previous value available is 1 7 in 2015  No labs available for three years  -I suspect CKD likely secondary to long-term hypertension causing hypertensive arteriosclerosis  -avoid nephrotoxins or NSAIDs  -urinalysis in the office today shows trace proteinuria, trace hematuria  Will do repeat urinalysis microscopy and UPC ratio this month  -repeat BMP this month and before next visit  If serum creatinine worsening, consider renal ultrasound  Hypertension  -blood pressure is well controlled in the office today  Currently remains on amlodipine 10 mg p o  b i d , atenolol 25 mg p o  daily and clonidine 0 1 mg p o  B i d   -consider reducing amlodipine given significant leg edema and very well controlled blood pressure  -if patient has significant proteinuria, consider adding lisinopril along with reducing amlodipine   -avoid low BP    Bilateral leg edema  -patient said this has been chronic and stable issue   ?  Due to high-dose amlodipine versus component of moderate MR/TR contributing  -patient does not seem to be in respiratory distress or overt CHF  Patient is not on any diuretic   -consider reducing amlodipine, he will discuss this further with Cardiology   -elevate legs, compression stockings if tolerated    Vitamin-D deficiency, last vitamin-D level 20 in February 2018  Currently remains on vitamin-D 2000 units p o  Daily  -repeat vitamin-D level during next visit    CKD BMD, check PTH, phosphorus before next visit    Low bicarb, last serum bicarb 21  Repeat BMP this month  HISTORY OF PRESENT ILLNESS:  Jazmin Preston is a 72y o  year old male with medical issues of hypertension for 20 years, paroxysmal AFib, CKD stage 3 with baseline creatinine 1 7, moderate MR/TR, bicuspid aortic valve who presents for initial consultation for renal failure  Old medical records were reviewed  Patient's previous creatinine 1 7 in 2015 and no other labs available for almost three years until last creatinine 1 7 in March 2018  Patient has BP machine at home although he does not check blood pressure at it has been very well controlled on current regimen  He has chronic stable leg edema  Occasionally has joint pain issues although denies any recent NSAID exposure  He has nocturia although denies any other urinary complaint  He denies any chest pain or shortness of breath, no nausea, vomiting or diarrhea  Denies any obvious kidney issues in the past   He remains on stable antihypertensive regimen  No recent change in his medications  REVIEW OF SYSTEMS:    Review of Systems   Constitutional: Negative for chills, fatigue and fever  HENT: Negative for congestion, ear pain and postnasal drip  Eyes: Negative for redness and visual disturbance  Respiratory: Negative for cough and shortness of breath  Cardiovascular: Positive for leg swelling  Negative for chest pain  Gastrointestinal: Negative for abdominal pain, diarrhea, nausea and vomiting  Endocrine: Negative for polyuria     Genitourinary: Negative for decreased urine volume, difficulty urinating, dysuria, frequency, hematuria and urgency  Nocturia occasionally   Musculoskeletal: Positive for arthralgias and back pain (occasionally)  Skin: Negative for rash  Neurological: Negative for dizziness, light-headedness and headaches  Hematological: Does not bruise/bleed easily  Psychiatric/Behavioral: Negative for confusion  The patient is not nervous/anxious  More than 10 point review of systems were obtained and discussed in length with the patient  Complete review of systems were negative / unremarkable except mentioned in the HPI section  PHYSICAL EXAM:  Vitals:    07/03/18 0754   BP: 117/72   BP Location: Left arm   Patient Position: Sitting   Cuff Size: Standard   Pulse: 90   Weight: 73 7 kg (162 lb 6 4 oz)   Height: 5' 5" (1 651 m)     Body mass index is 27 02 kg/m²  Physical Exam   Constitutional: He is oriented to person, place, and time  He appears well-developed and well-nourished  HENT:   Head: Normocephalic and atraumatic  Right Ear: External ear normal    Left Ear: External ear normal    Nose: Nose normal    Eyes: Conjunctivae and EOM are normal  Pupils are equal, round, and reactive to light  No scleral icterus  Neck: Neck supple  No JVD present  Cardiovascular: Normal rate  S1, s2 present   Pulmonary/Chest: Effort normal and breath sounds normal  No respiratory distress  He has no wheezes  He has no rales  Abdominal: Soft  Bowel sounds are normal  He exhibits no distension  There is no tenderness  Musculoskeletal: He exhibits edema (1+ edema in legs)  He exhibits no tenderness  Neurological: He is alert and oriented to person, place, and time  Skin: Skin is warm and dry  Psychiatric: He has a normal mood and affect  His behavior is normal    Vitals reviewed  PAST MEDICAL HISTORY:  Past Medical History:   Diagnosis Date    Atrial fibrillation ([address] )        PAST SURGICAL HISTORY:  History reviewed   No pertinent surgical history  ALLERGIES:  Allergies   Allergen Reactions    Other        SOCIAL HISTORY:  History   Alcohol Use    2 4 oz/week    4 Shots of liquor per week     History   Drug Use No     History   Smoking Status    Never Smoker   Smokeless Tobacco    Never Used       FAMILY HISTORY:  Family History   Problem Relation Age of Onset    Alzheimer's disease Mother     Coronary artery disease Father     Heart defect Father         congenital     Stroke Father     Other Father         cerebral hemorrhage        MEDICATIONS:    Current Outpatient Prescriptions:     amLODIPine (NORVASC) 10 mg tablet, Take 1 tablet (10 mg total) by mouth 2 (two) times a day, Disp: 180 tablet, Rfl: 3    Ascorbic Acid (VITAMIN C) 500 MG/5ML LIQD, Take 1 tablet by mouth daily, Disp: , Rfl:     atenolol (TENORMIN) 25 mg tablet, TAKE 3 TABLETS BY MOUTH DAILY, (2 TABLETS IN THE MORNING AND 1 TABLET IN THE EVENING), Disp: 270 tablet, Rfl: 3    Cholecalciferol (VITAMIN D) 2000 units tablet, Take 2,000 Units by mouth daily, Disp: , Rfl:     cloNIDine (CATAPRES) 0 1 mg tablet, Take 1 tablet by mouth 2 (two) times a day, Disp: , Rfl:     niacin 500 mg tablet, Take 1 tablet by mouth daily, Disp: , Rfl:     Omega-3 Fatty Acids (FISH OIL) 645 MG CAPS, Take 1 capsule by mouth daily, Disp: , Rfl:     PRADAXA 150 MG capsu, TAKE 1 CAPSULE TWICE DAILY  , Disp: 180 capsule, Rfl: 3    Lab Results:   Results for orders placed or performed in visit on 07/03/18   POCT urine dip   Result Value Ref Range    LEUKOCYTE ESTERASE,UA NEGATIVE      NITRITE,UA NEGATIVE     SL AMB POCT UROBILINOGEN 0 2     SL AMB POCT URINE PROTEIN TRACE      PH,UA 6 0      BLOOD,UA TRACE      SPECIFIC GRAVITY,UA 1 020      KETONES,UA NEGATIVE      BILIRUBIN,UA NEGATIVE     GLUCOSE, UA NEGATIVE      COLOR,UA YELLOW      CLARITY,UA CLEAR     Last serum creatinine 1 7 in March 2018    Portions of the record may have been created with voice recognition software  Occasional wrong word or "sound a like" substitutions may have occurred due to the inherent limitations of voice recognition software  Read the chart carefully and recognize, using context, where substitutions have occurred

## 2018-07-03 NOTE — PROGRESS NOTES
NEPHROLOGY OUTPATIENT CONSULTATION   Yvonne Sequeira 72 y o  male MRN: [id number]  Date: 7/3/2018  Reason for consultation:   Chief Complaint   Patient presents with    Consult    Chronic Kidney Disease    Hypertension     ASSESSMENT AND PLAN:  Likely CKD stage 3 with baseline creatinine 1 7 going back to 2015  -last creatinine 1 7 in March 2018 overall stable although only previous value available is 1 7 in 2015  No labs available for three years  -I suspect CKD likely secondary to long-term hypertension causing hypertensive arteriosclerosis  -avoid nephrotoxins or NSAIDs  -urinalysis in the office today shows trace proteinuria, trace hematuria  Will do repeat urinalysis microscopy and UPC ratio this month  -repeat BMP this month and before next visit  If serum creatinine worsening, consider renal ultrasound  Hypertension  -blood pressure is well controlled in the office today  Currently remains on amlodipine 10 mg p o  b i d , atenolol 25 mg p o  daily and clonidine 0 1 mg p o  B i d   -consider reducing amlodipine given significant leg edema and very well controlled blood pressure  -if patient has significant proteinuria, consider adding lisinopril along with reducing amlodipine   -avoid low BP    Bilateral leg edema  -patient said this has been chronic and stable issue  ?  Due to high-dose amlodipine versus component of moderate MR/TR contributing  -patient does not seem to be in respiratory distress or overt CHF  Patient is not on any diuretic   -consider reducing amlodipine, he will discuss this further with Cardiology   -elevate legs, compression stockings if tolerated    Vitamin-D deficiency, last vitamin-D level 20 in February 2018  Currently remains on vitamin-D 2000 units p o  Daily  -repeat vitamin-D level during next visit    CKD BMD, check PTH, phosphorus before next visit    Low bicarb, last serum bicarb 21  Repeat BMP this month  HISTORY OF PRESENT ILLNESS:  Yvonne Sequeira is a 72 y o  year old male with medical issues of hypertension for 20 years, paroxysmal AFib, CKD stage 3 with baseline creatinine 1 7, moderate MR/TR, bicuspid aortic valve who presents for initial consultation for renal failure  Old medical records were reviewed  Patient's previous creatinine 1 7 in 2015 and no other labs available for almost three years until last creatinine 1 7 in March 2018  Patient has BP machine at home although he does not check blood pressure at it has been very well controlled on current regimen  He has chronic stable leg edema  Occasionally has joint pain issues although denies any recent NSAID exposure  He has nocturia although denies any other urinary complaint  He denies any chest pain or shortness of breath, no nausea, vomiting or diarrhea  Denies any obvious kidney issues in the past   He remains on stable antihypertensive regimen  No recent change in his medications  REVIEW OF SYSTEMS:    Review of Systems   Constitutional: Negative for chills, fatigue and fever  HENT: Negative for congestion, ear pain and postnasal drip  Eyes: Negative for redness and visual disturbance  Respiratory: Negative for cough and shortness of breath  Cardiovascular: Positive for leg swelling  Negative for chest pain  Gastrointestinal: Negative for abdominal pain, diarrhea, nausea and vomiting  Endocrine: Negative for polyuria  Genitourinary: Negative for decreased urine volume, difficulty urinating, dysuria, frequency, hematuria and urgency  Nocturia occasionally   Musculoskeletal: Positive for arthralgias and back pain (occasionally)  Skin: Negative for rash  Neurological: Negative for dizziness, light-headedness and headaches  Hematological: Does not bruise/bleed easily  Psychiatric/Behavioral: Negative for confusion  The patient is not nervous/anxious  More than 10 point review of systems were obtained and discussed in length with the patient   Complete review of systems were negative / unremarkable except mentioned in the HPI section  PHYSICAL EXAM:  Vitals:    07/03/18 0754   BP: 117/72   BP Location: Left arm   Patient Position: Sitting   Cuff Size: Standard   Pulse: 90   Weight: 73 7 kg (162 lb 6 4 oz)   Height: 5' 5" (1 651 m)     Body mass index is 27 02 kg/m²  Physical Exam   Constitutional: He is oriented to person, place, and time  He appears well-developed and well-nourished  HENT:   Head: Normocephalic and atraumatic  Right Ear: External ear normal    Left Ear: External ear normal    Nose: Nose normal    Eyes: Conjunctivae and EOM are normal  Pupils are equal, round, and reactive to light  No scleral icterus  Neck: Neck supple  No JVD present  Cardiovascular: Normal rate  S1, s2 present   Pulmonary/Chest: Effort normal and breath sounds normal  No respiratory distress  He has no wheezes  He has no rales  Abdominal: Soft  Bowel sounds are normal  He exhibits no distension  There is no tenderness  Musculoskeletal: He exhibits edema (1+ edema in legs)  He exhibits no tenderness  Neurological: He is alert and oriented to person, place, and time  Skin: Skin is warm and dry  Psychiatric: He has a normal mood and affect  His behavior is normal    Vitals reviewed  PAST MEDICAL HISTORY:  Past Medical History:   Diagnosis Date    At[address] ([facility] [address] )        PAST SURGICAL HISTORY:  History reviewed  No pertinent surgical history      ALLERGIES:  Allergies   Allergen Reactions    Other        SOCIAL HISTORY:  History   Alcohol Use    2 4 oz/week    4 Shots of liquor per week     History   Drug Use No     History   Smoking Status    Never Smoker   Smokeless Tobacco    Never Used       FAMILY HISTORY:  Family History   Problem Relation Age of Onset    Alzheimer's disease Mother     Coronary artery disease Father     Heart defect Father         congenital     Stroke Father     Other Father         cerebral hemorrhage MEDICATIONS:    Current Outpatient Prescriptions:     amLODIPine (NORVASC) 10 mg tablet, Take 1 tablet (10 mg total) by mouth 2 (two) times a day, Disp: 180 tablet, Rfl: 3    Ascorbic Acid (VITAMIN C) 500 MG/5ML LIQD, Take 1 tablet by mouth daily, Disp: , Rfl:     atenolol (TENORMIN) 25 mg tablet, TAKE 3 TABLETS BY MOUTH DAILY, (2 TABLETS IN THE MORNING AND 1 TABLET IN THE EVENING), Disp: 270 tablet, Rfl: 3    Cholecalciferol (VITAMIN D) 2000 units tablet, Take 2,000 Units by mouth daily, Disp: , Rfl:     cloNIDine (CATAPRES) 0 1 mg tablet, Take 1 tablet by mouth 2 (two) times a day, Disp: , Rfl:     niacin 500 mg tablet, Take 1 tablet by mouth daily, Disp: , Rfl:     Omega-3 Fatty Acids (FISH OIL) 645 MG CAPS, Take 1 capsule by mouth daily, Disp: , Rfl:     PRADAXA 150 MG capsu, TAKE 1 CAPSULE TWICE DAILY  , Disp: 180 capsule, Rfl: 3    Lab Results:   Results for orders placed or performed in visit on 07/03/18   POCT urine dip   Result Value Ref Range    LEUKOCYTE ESTERASE,UA NEGATIVE      NITRITE,UA NEGATIVE     SL AMB POCT UROBILINOGEN 0 2     SL AMB POCT URINE PROTEIN TRACE      PH,UA 6 0      BLOOD,UA TRACE      SPECIFIC GRAVITY,UA 1 020      KETONES,UA NEGATIVE      BILIRUBIN,UA NEGATIVE     GLUCOSE, UA NEGATIVE      COLOR,UA YELLOW      CLARITY,UA CLEAR     Last serum creatinine 1 7 in March 2018    Portions of the record may have been created with voice recognition software  Occasional wrong word or "sound a like" substitutions may have occurred due to the inherent limitations of voice recognition software  Read the chart carefully and recognize, using context, where substitutions have occurred

## 2018-07-09 ENCOUNTER — APPOINTMENT (OUTPATIENT)
Dept: LAB | Facility: HOSPITAL | Age: 73
End: 2018-07-09
Attending: INTERNAL MEDICINE
Payer: COMMERCIAL

## 2018-07-09 DIAGNOSIS — N18.30 STAGE 3 CHRONIC KIDNEY DISEASE (HCC): ICD-10-CM

## 2018-07-09 LAB
ANION GAP SERPL CALCULATED.3IONS-SCNC: 9 MMOL/L (ref 4–13)
BILIRUB UR QL STRIP: NEGATIVE
BUN SERPL-MCNC: 27 MG/DL (ref 5–25)
CALCIUM SERPL-MCNC: 9 MG/DL (ref 8.3–10.1)
CHLORIDE SERPL-SCNC: 108 MMOL/L (ref 100–108)
CLARITY UR: CLEAR
CO2 SERPL-SCNC: 21 MMOL/L (ref 21–32)
COLOR UR: YELLOW
CREAT SERPL-MCNC: 1.84 MG/DL (ref 0.6–1.3)
CREAT UR-MCNC: 97.1 MG/DL
GFR SERPL CREATININE-BSD FRML MDRD: 36 ML/MIN/1.73SQ M
GLUCOSE SERPL-MCNC: 133 MG/DL (ref 65–140)
GLUCOSE UR STRIP-MCNC: NEGATIVE MG/DL
HGB UR QL STRIP.AUTO: NEGATIVE
KETONES UR STRIP-MCNC: NEGATIVE MG/DL
LEUKOCYTE ESTERASE UR QL STRIP: NEGATIVE
NITRITE UR QL STRIP: NEGATIVE
PH UR STRIP.AUTO: 5.5 [PH] (ref 4.5–8)
POTASSIUM SERPL-SCNC: 4.5 MMOL/L (ref 3.5–5.3)
PROT UR STRIP-MCNC: NEGATIVE MG/DL
PROT UR-MCNC: 15 MG/DL
PROT/CREAT UR: 0.15 MG/G{CREAT} (ref 0–0.1)
SODIUM SERPL-SCNC: 138 MMOL/L (ref 136–145)
SP GR UR STRIP.AUTO: 1.01 (ref 1–1.03)
UROBILINOGEN UR QL STRIP.AUTO: 0.2 E.U./DL

## 2018-07-09 PROCEDURE — 84156 ASSAY OF PROTEIN URINE: CPT

## 2018-07-09 PROCEDURE — 82570 ASSAY OF URINE CREATININE: CPT

## 2018-07-09 PROCEDURE — 81003 URINALYSIS AUTO W/O SCOPE: CPT

## 2018-07-09 PROCEDURE — 36415 COLL VENOUS BLD VENIPUNCTURE: CPT

## 2018-07-09 PROCEDURE — 80048 BASIC METABOLIC PNL TOTAL CA: CPT

## 2018-07-19 ENCOUNTER — TELEPHONE (OUTPATIENT)
Dept: NEPHROLOGY | Facility: CLINIC | Age: 73
End: 2018-07-19

## 2018-07-19 NOTE — TELEPHONE ENCOUNTER
Can you let patient know that serum creatinine 1 8 overall stable and near to his previous baseline  No changes at this time  Urine test looks ok

## 2018-09-11 ENCOUNTER — OFFICE VISIT (OUTPATIENT)
Dept: UROLOGY | Facility: CLINIC | Age: 73
End: 2018-09-11
Payer: COMMERCIAL

## 2018-09-11 VITALS
SYSTOLIC BLOOD PRESSURE: 116 MMHG | HEIGHT: 64 IN | WEIGHT: 163 LBS | BODY MASS INDEX: 27.83 KG/M2 | HEART RATE: 71 BPM | DIASTOLIC BLOOD PRESSURE: 80 MMHG

## 2018-09-11 DIAGNOSIS — D40.0 NEOPLASM OF UNCERTAIN BEHAVIOR OF PROSTATE: ICD-10-CM

## 2018-09-11 DIAGNOSIS — N42.9 ABNORMAL PROSTATE ON PHYSICAL EXAMINATION: Primary | ICD-10-CM

## 2018-09-11 LAB
SL AMB  POCT GLUCOSE, UA: NORMAL
SL AMB LEUKOCYTE ESTERASE,UA: NORMAL
SL AMB POCT BILIRUBIN,UA: NORMAL
SL AMB POCT BLOOD,UA: NORMAL
SL AMB POCT CLARITY,UA: CLEAR
SL AMB POCT COLOR,UA: YELLOW
SL AMB POCT KETONES,UA: NORMAL
SL AMB POCT NITRITE,UA: NORMAL
SL AMB POCT PH,UA: 5
SL AMB POCT SPECIFIC GRAVITY,UA: 1.01
SL AMB POCT URINE PROTEIN: NORMAL
SL AMB POCT UROBILINOGEN: NORMAL

## 2018-09-11 PROCEDURE — 99213 OFFICE O/P EST LOW 20 MIN: CPT | Performed by: UROLOGY

## 2018-09-11 PROCEDURE — 81002 URINALYSIS NONAUTO W/O SCOPE: CPT | Performed by: UROLOGY

## 2018-09-11 NOTE — LETTER
September 11, 2018     Adarsh Johns DO  [facility] [address] [place] [address]    Patient: Sammie April   YOB: 1945   Date of Visit: 9/11/2018       Dear Dr Jose Luis Lee: Thank you for referring Elise Siddiqui to me for evaluation  Below are my notes for this consultation  If you have questions, please do not hesitate to call me  I look forward to following your patient along with you           Sincerely,        Avery Glaser MD        CC: No Recipients

## 2018-09-11 NOTE — PROGRESS NOTES
Progress Note - Urology  Norah Mistry 72 y o  male MRN: [id number]  Encounter: [id number]      Chief Complaint:   Chief Complaint   Patient presents with    Abnormal Prostate     6 Month Follow Up / Psa was not done  HPI:    79-year-old male seen initially for an abnormal digital rectal examination  At that time he was felt to have firmness on the right side of his gland  A multiparametric MRI was performed  This interestingly did not show any abnormality on the right side but reads as a PI-RADS 3 on the left side  This PSA is 2 2  MEDS:    Current Outpatient Prescriptions:     amLODIPine (NORVASC) 10 mg tablet, Take 1 tablet (10 mg total) by mouth 2 (two) times a day, Disp: 180 tablet, Rfl: 3    Ascorbic Acid (VITAMIN C) 500 MG/5ML LIQD, Take 1 tablet by mouth daily, Disp: , Rfl:     atenolol (TENORMIN) 25 mg tablet, TAKE 3 TABLETS BY MOUTH DAILY, (2 TABLETS IN THE MORNING AND 1 TABLET IN THE EVENING), Disp: 270 tablet, Rfl: 3    Cholecalciferol (VITAMIN D) 2000 units tablet, Take 2,000 Units by mouth daily, Disp: , Rfl:     cloNIDine (CATAPRES) 0 1 mg tablet, Take 1 tablet by mouth 2 (two) times a day, Disp: , Rfl:     niacin 500 mg tablet, Take 1 tablet by mouth daily, Disp: , Rfl:     Omega-3 Fatty Acids (FISH OIL) 645 MG CAPS, Take 1 capsule by mouth daily, Disp: , Rfl:     PRADAXA 150 MG capsu, TAKE 1 CAPSULE TWICE DAILY  , Disp: 180 capsule, Rfl: 3      PMH:  Past Medical History:   Diagnosis Date    Atrial fibrillation ([address] )          [facility]  History reviewed  No pertinent surgical history  ROS:  Review of Systems      Vitals:  Blood pressure 116/80, pulse 71, height 5' 4" (1 626 m), weight 73 9 kg (163 lb)  Physical Exam:     Examination at this time again shows rectal examination revealing firmness on the right lobe of the prostate  I do not feel any abnormality on the left side     Lab, Imaging and other studies:  Recent Results (from the past 672 hour(s))   POCT urine dip Collection Time: 09/11/18 10:55 AM   Result Value Ref Range    LEUKOCYTE ESTERASE,UA neg     NITRITE,UA neg     SL AMB POCT UROBILINOGEN neg     SL AMB POCT URINE PROTEIN neg      PH,UA 5      BLOOD,UA neg     SPECIFIC GRAVITY,UA 1 015     KETONES,UA neg      BILIRUBIN,UA neg     GLUCOSE, UA neg      COLOR,UA yellow      CLARITY,UA clear            IMPRESSION:    Abnormal digital rectal examination    PLAN:   I will recheck him in May of next year with repeat examination, digital rectal examination, PSA, and multiparametric MRI repeat

## 2018-11-29 ENCOUNTER — OFFICE VISIT (OUTPATIENT)
Dept: NEPHROLOGY | Facility: CLINIC | Age: 73
End: 2018-11-29
Payer: COMMERCIAL

## 2018-11-29 VITALS
WEIGHT: 166 LBS | SYSTOLIC BLOOD PRESSURE: 122 MMHG | HEART RATE: 70 BPM | DIASTOLIC BLOOD PRESSURE: 86 MMHG | HEIGHT: 64 IN | BODY MASS INDEX: 28.34 KG/M2

## 2018-11-29 DIAGNOSIS — I15.9 SECONDARY HYPERTENSION: ICD-10-CM

## 2018-11-29 DIAGNOSIS — E55.9 VITAMIN D DEFICIENCY: ICD-10-CM

## 2018-11-29 DIAGNOSIS — I12.9 BENIGN HYPERTENSION WITH CKD (CHRONIC KIDNEY DISEASE) STAGE III (HCC): ICD-10-CM

## 2018-11-29 DIAGNOSIS — E87.8 LOW BICARBONATE: ICD-10-CM

## 2018-11-29 DIAGNOSIS — R60.0 BILATERAL LEG EDEMA: ICD-10-CM

## 2018-11-29 DIAGNOSIS — N18.30 BENIGN HYPERTENSION WITH CKD (CHRONIC KIDNEY DISEASE) STAGE III (HCC): ICD-10-CM

## 2018-11-29 DIAGNOSIS — N18.30 STAGE 3 CHRONIC KIDNEY DISEASE (HCC): Primary | ICD-10-CM

## 2018-11-29 PROCEDURE — 99214 OFFICE O/P EST MOD 30 MIN: CPT | Performed by: INTERNAL MEDICINE

## 2018-11-29 RX ORDER — AMLODIPINE BESYLATE 10 MG/1
10 TABLET ORAL DAILY
Qty: 180 TABLET | Refills: 0 | Status: SHIPPED | OUTPATIENT
Start: 2018-11-29 | End: 2019-01-24 | Stop reason: SDUPTHER

## 2018-11-29 NOTE — LETTER
November 29, 2018     Lan Watson DO  [facility] [address] [place] [address]    Patient: Asia Pepper   YOB: 1945   Date of Visit: 11/29/2018       Dear Dr Juan Miguel Zepeda: Thank you for referring Yaquelin Arriaza to me for evaluation  Below are my notes for this consultation  If you have questions, please do not hesitate to call me  I look forward to following your patient along with you  Sincerely,        Meagan Kunz MD        CC: No Recipients  Meagan Kunz MD  11/29/2018  9:01 AM  Sign at close encounter  [facility] NOTE   Asia Pepper 72 y o  male MRN: [id number]  DATE: 11/29/2018  Reason for visit:   Chief Complaint   Patient presents with    Follow-up    Chronic Kidney Disease     ASSESSMENT and PLAN:  Likely CKD stage 3 with baseline creatinine 1 7 going back to 2015  -last creatinine 1 8 slightly increased from his baseline in July 2018  No labs available since then   -I suspect CKD likely secondary to long-term hypertension causing hypertensive arteriosclerosis  -avoid nephrotoxins or NSAIDs  -urinalysis on multiple occasion shows no hematuria or proteinuria  Upc ratio 150 mg  -repeat BMP before next visit     -check renal ultrasound     Hypertension  -blood pressure is well controlled in the office today  Currently remains on amlodipine 10 mg p o  b i d , atenolol 50  mg in a m /25 mg in p m , and clonidine 0 1 mg p o  B i d   -I have recommended to reduce amlodipine during last visit which he has not done yet  I recommended again to reduce amlodipine given his significant leg edema issues, he is agreeable and will reduce to 10 mg p o  Daily  -he does have blood pressure machine at home although does not check blood pressure    Strongly advised him to check BP at home after reducing amlodipine and call back if blood pressure remains greater than 140/90 in which case may need to consider diuretic if leg edema remains persistent   -also advised to bring BP machine during next office visit      Bilateral leg edema  -patient said this has been chronic and stable issue  ?  Due to high-dose amlodipine versus component of moderate MR/TR contributing  -patient does not seem to be in respiratory distress or overt CHF  Patient is not on any diuretic   -reduce amlodipine as above  -elevate legs, compression stockings if tolerated     Vitamin-D deficiency, last vitamin-D level 20 in February 2018  Currently remains on vitamin-D 2000 units p o  Daily  -repeat vitamin-D level before next visit     CKD BMD, check PTH, phosphorus before next visit     Low bicarb, last serum bicarb 21 overall stable  Diagnoses and all orders for this visit:    Stage 3 chronic kidney disease ([facility] [address] )  -     Basic metabolic panel; Future  -     CBC; Future  -     Protein / creatinine ratio, urine; Future  -     Phosphorus; Future  -     PTH, intact; Future  -     US retroperitoneal complete; Future    Bilateral leg edema    Benign hypertension with CKD (chronic kidney disease) stage III ([facility])    Vitamin D deficiency  -     Vitamin D 25 hydroxy; Future    Low bicarbonate  -     Basic metabolic panel; Future    Secondary hypertension  -     amLODIPine (NORVASC) 10 mg tablet; Take 1 tablet (10 mg total) by mouth daily          SUBJECTIVE / HPI:  Dafne Mejia is a 72y o  year old male with medical issues of hypertension for 20 years, paroxysmal AFib, CKD stage 3 with baseline creatinine 1 7, moderate MR/TR, bicuspid aortic valve who presents for regular follow-up for renal failure  Patient's previous creatinine 1 7 in 2015 and no other labs available for almost three years until creatinine 1 7 in March 2018  last creatinine 1 8 slightly increased from baseline in July 2018  Patient has BP machine at home although he does not check blood pressure  He has chronic stable leg edema  Occasionally has joint pain issues although denies any recent NSAID exposure    He has nocturia although denies any other urinary complaint  He denies any chest pain or shortness of breath, no nausea, vomiting or diarrhea      Denies any obvious kidney issues in the past   He remains on stable antihypertensive regimen  No recent change in his medications  He was recommended to reduce amlodipine during last visit and he was going to discuss with Cardiology although has not reduced it yet  REVIEW OF SYSTEMS:  More than 10 point review of systems were obtained and discussed in length with the patient  Complete review of systems were negative / unremarkable except mentioned above  PHYSICAL EXAM:  Vitals:    11/29/18 0828 11/29/18 0847   BP: 122/76 122/86   BP Location: Left arm    Patient Position: Sitting    Cuff Size: Adult    Pulse: 70    Weight: 75 3 kg (166 lb)    Height: 5' 4" (1 626 m)      Body mass index is 28 49 kg/m²  Physical Exam   Constitutional: He is oriented to person, place, and time  He appears well-developed and well-nourished  HENT:   Head: Normocephalic and atraumatic  Right Ear: External ear normal    Left Ear: External ear normal    Nose: Nose normal    Eyes: Pupils are equal, round, and reactive to light  Conjunctivae and EOM are normal  No scleral icterus  Neck: Neck supple  No JVD present  Cardiovascular: Normal rate and normal heart sounds  Pulmonary/Chest: Effort normal and breath sounds normal  No respiratory distress  He has no wheezes  He has no rales  Abdominal: Soft  Bowel sounds are normal  He exhibits no distension  There is no tenderness  Musculoskeletal: He exhibits edema (2+ edema in both legs)  He exhibits no tenderness  Neurological: He is alert and oriented to person, place, and time  Skin: Skin is warm and dry  Psychiatric: He has a normal mood and affect  His behavior is normal    Vitals reviewed  PAST MEDICAL HISTORY:  Past Medical History:   Diagnosis Date    Atrial fibrillation ([address] )        PAST SURGICAL HISTORY:  History reviewed   No pertinent surgical history  SOCIAL HISTORY:  History   Alcohol Use    2 4 oz/week    4 Shots of liquor per week     History   Drug Use No     History   Smoking Status    Never Smoker   Smokeless Tobacco    Never Used       FAMILY HISTORY:  Family History   Problem Relation Age of Onset    Alzheimer's disease Mother     Coronary artery disease Father     Heart defect Father         congenital     Stroke Father     Other Father         cerebral hemorrhage        MEDICATIONS:    Current Outpatient Prescriptions:     amLODIPine (NORVASC) 10 mg tablet, Take 1 tablet (10 mg total) by mouth daily, Disp: 180 tablet, Rfl: 0    Ascorbic Acid (VITAMIN C) 500 MG/5ML LIQD, Take 1 tablet by mouth daily, Disp: , Rfl:     atenolol (TENORMIN) 25 mg tablet, TAKE 3 TABLETS BY MOUTH DAILY, (2 TABLETS IN THE MORNING AND 1 TABLET IN THE EVENING), Disp: 270 tablet, Rfl: 3    Cholecalciferol (VITAMIN D) 2000 units tablet, Take 2,000 Units by mouth daily, Disp: , Rfl:     cloNIDine (CATAPRES) 0 1 mg tablet, Take 1 tablet by mouth 2 (two) times a day, Disp: , Rfl:     niacin 500 mg tablet, Take 1 tablet by mouth daily, Disp: , Rfl:     Omega-3 Fatty Acids (FISH OIL) 645 MG CAPS, Take 1 capsule by mouth daily, Disp: , Rfl:     PRADAXA 150 MG capsu, TAKE 1 CAPSULE TWICE DAILY  , Disp: 180 capsule, Rfl: 3    Lab Results:   Results for orders placed or performed in visit on 09/11/18   POCT urine dip   Result Value Ref Range    LEUKOCYTE ESTERASE,UA neg     NITRITE,UA neg     SL AMB POCT UROBILINOGEN neg     POCT URINE PROTEIN neg      PH,UA 5     BLOOD,UA neg     SPECIFIC GRAVITY,UA 1 015     KETONES,UA neg     BILIRUBIN,UA neg     GLUCOSE, UA neg      COLOR,UA yellow     CLARITY,UA clear     Creatinine 1 8 in July 2018

## 2018-11-29 NOTE — PATIENT INSTRUCTIONS
-decrease amlodipine from 10 mg twice daily to daily  -check blood pressure regularly after reducing amlodipine and call back if blood pressure remains greater than 140/90  -salt restricted diet  -bring blood pressure machine and all your blood pressure readings during next visit  -use compression stockings or Ace wrap if possible

## 2018-11-29 NOTE — PROGRESS NOTES
NEPHROLOGY OUTPATIENT PROGRESS NOTE   Penelope Deleon 72 y o  male MRN: [id number]  DATE: 11/29/2018  Reason for visit:   Chief Complaint   Patient presents with    Follow-up    Chronic Kidney Disease     ASSESSMENT and PLAN:  Likely CKD stage 3 with baseline creatinine 1 7 going back to 2015  -last creatinine 1 8 slightly increased from his baseline in July 2018  No labs available since then   -I suspect CKD likely secondary to long-term hypertension causing hypertensive arteriosclerosis  -avoid nephrotoxins or NSAIDs  -urinalysis on multiple occasion shows no hematuria or proteinuria  Upc ratio 150 mg  -repeat BMP before next visit     -check renal ultrasound     Hypertension  -blood pressure is well controlled in the office today  Currently remains on amlodipine 10 mg p o  b i d , atenolol 50  mg in a m /25 mg in p m , and clonidine 0 1 mg p o  B i d   -I have recommended to reduce amlodipine during last visit which he has not done yet  I recommended again to reduce amlodipine given his significant leg edema issues, he is agreeable and will reduce to 10 mg p o  Daily  -he does have blood pressure machine at home although does not check blood pressure  Strongly advised him to check BP at home after reducing amlodipine and call back if blood pressure remains greater than 140/90 in which case may need to consider diuretic if leg edema remains persistent   -also advised to bring BP machine during next office visit      Bilateral leg edema  -patient said this has been chronic and stable issue  ?  Due to high-dose amlodipine versus component of moderate MR/TR contributing  -patient does not seem to be in respiratory distress or overt CHF  Patient is not on any diuretic   -reduce amlodipine as above  -elevate legs, compression stockings if tolerated     Vitamin-D deficiency, last vitamin-D level 20 in February 2018  Currently remains on vitamin-D 2000 units p o   Daily  -repeat vitamin-D level before next visit     CKD BMD, check PTH, phosphorus before next visit     Low bicarb, last serum bicarb 21 overall stable  Diagnoses and all orders for this visit:    Stage 3 chronic kidney disease ([facility] [address] )  -     Basic metabolic panel; Future  -     CBC; Future  -     Protein / creatinine ratio, urine; Future  -     Phosphorus; Future  -     PTH, intact; Future  -     US retroperitoneal complete; Future    Bilateral leg edema    Benign hypertension with CKD (chronic kidney disease) stage III ([facility])    Vitamin D deficiency  -     Vitamin D 25 hydroxy; Future    Low bicarbonate  -     Basic metabolic panel; Future    Secondary hypertension  -     amLODIPine (NORVASC) 10 mg tablet; Take 1 tablet (10 mg total) by mouth daily          SUBJECTIVE / HPI:  Donte Proctor is a 72y o  year old male with medical issues of hypertension for 20 years, paroxysmal AFib, CKD stage 3 with baseline creatinine 1 7, moderate MR/TR, bicuspid aortic valve who presents for regular follow-up for renal failure  Patient's previous creatinine 1 7 in 2015 and no other labs available for almost three years until creatinine 1 7 in March 2018  last creatinine 1 8 slightly increased from baseline in July 2018  Patient has BP machine at home although he does not check blood pressure  He has chronic stable leg edema  Occasionally has joint pain issues although denies any recent NSAID exposure  He has nocturia although denies any other urinary complaint  He denies any chest pain or shortness of breath, no nausea, vomiting or diarrhea      Denies any obvious kidney issues in the past   He remains on stable antihypertensive regimen  No recent change in his medications  He was recommended to reduce amlodipine during last visit and he was going to discuss with Cardiology although has not reduced it yet  REVIEW OF SYSTEMS:  More than 10 point review of systems were obtained and discussed in length with the patient   Complete review of systems were negative / unremarkable except mentioned above  PHYSICAL EXAM:  Vitals:    11/29/18 0828 11/29/18 0847   BP: 122/76 122/86   BP Location: Left arm    Patient Position: Sitting    Cuff Size: Adult    Pulse: 70    Weight: 75 3 kg (166 lb)    Height: 5' 4" (1 626 m)      Body mass index is 28 49 kg/m²  Physical Exam   Constitutional: He is oriented to person, place, and time  He appears well-developed and well-nourished  HENT:   Head: Normocephalic and atraumatic  Right Ear: External ear normal    Left Ear: External ear normal    Nose: Nose normal    Eyes: Pupils are equal, round, and reactive to light  Conjunctivae and EOM are normal  No scleral icterus  Neck: Neck supple  No JVD present  Cardiovascular: Normal rate and normal heart sounds  Pulmonary/Chest: Effort normal and breath sounds normal  No respiratory distress  He has no wheezes  He has no rales  Abdominal: Soft  Bowel sounds are normal  He exhibits no distension  There is no tenderness  Musculoskeletal: He exhibits edema (2+ edema in both legs)  He exhibits no tenderness  Neurological: He is alert and oriented to person, place, and time  Skin: Skin is warm and dry  Psychiatric: He has a normal mood and affect  His behavior is normal    Vitals reviewed  PAST MEDICAL HISTORY:  Past Medical History:   Diagnosis Date    Atrial fibrillation ([address] )        PAST SURGICAL HISTORY:  History reviewed  No pertinent surgical history      SOCIAL HISTORY:  History   Alcohol Use    2 4 oz/week    4 Shots of liquor per week     History   Drug Use No     History   Smoking Status    Never Smoker   Smokeless Tobacco    Never Used       FAMILY HISTORY:  Family History   Problem Relation Age of Onset    Alzheimer's disease Mother     Coronary artery disease Father     Heart defect Father         congenital     Stroke Father     Other Father         cerebral hemorrhage        MEDICATIONS:    Current Outpatient Prescriptions:     amLODIPine (NORVASC) 10 mg tablet, Take 1 tablet (10 mg total) by mouth daily, Disp: 180 tablet, Rfl: 0    Ascorbic Acid (VITAMIN C) 500 MG/5ML LIQD, Take 1 tablet by mouth daily, Disp: , Rfl:     atenolol (TENORMIN) 25 mg tablet, TAKE 3 TABLETS BY MOUTH DAILY, (2 TABLETS IN THE MORNING AND 1 TABLET IN THE EVENING), Disp: 270 tablet, Rfl: 3    Cholecalciferol (VITAMIN D) 2000 units tablet, Take 2,000 Units by mouth daily, Disp: , Rfl:     cloNIDine (CATAPRES) 0 1 mg tablet, Take 1 tablet by mouth 2 (two) times a day, Disp: , Rfl:     niacin 500 mg tablet, Take 1 tablet by mouth daily, Disp: , Rfl:     Omega-3 Fatty Acids (FISH OIL) 645 MG CAPS, Take 1 capsule by mouth daily, Disp: , Rfl:     PRADAXA 150 MG capsu, TAKE 1 CAPSULE TWICE DAILY  , Disp: 180 capsule, Rfl: 3    Lab Results:   Results for orders placed or performed in visit on 09/11/18   POCT urine dip   Result Value Ref Range    LEUKOCYTE ESTERASE,UA neg     NITRITE,UA neg     SL AMB POCT UROBILINOGEN neg     POCT URINE PROTEIN neg      PH,UA 5     BLOOD,UA neg     SPECIFIC GRAVITY,UA 1 015     KETONES,UA neg     BILIRUBIN,UA neg     GLUCOSE, UA neg      COLOR,UA yellow     CLARITY,UA clear     Creatinine 1 8 in July 2018

## 2018-12-03 ENCOUNTER — OFFICE VISIT (OUTPATIENT)
Dept: FAMILY MEDICINE CLINIC | Facility: CLINIC | Age: 73
End: 2018-12-03
Payer: COMMERCIAL

## 2018-12-03 VITALS
HEIGHT: 64 IN | BODY MASS INDEX: 28.03 KG/M2 | TEMPERATURE: 97.6 F | DIASTOLIC BLOOD PRESSURE: 74 MMHG | HEART RATE: 77 BPM | WEIGHT: 164.2 LBS | OXYGEN SATURATION: 97 % | SYSTOLIC BLOOD PRESSURE: 136 MMHG

## 2018-12-03 DIAGNOSIS — N18.30 BENIGN HYPERTENSION WITH CKD (CHRONIC KIDNEY DISEASE) STAGE III (HCC): Primary | ICD-10-CM

## 2018-12-03 DIAGNOSIS — R39.89 ABNORMAL PROSTATE EXAM: ICD-10-CM

## 2018-12-03 DIAGNOSIS — I12.9 BENIGN HYPERTENSION WITH CKD (CHRONIC KIDNEY DISEASE) STAGE III (HCC): Primary | ICD-10-CM

## 2018-12-03 DIAGNOSIS — E78.00 HYPERCHOLESTEREMIA: ICD-10-CM

## 2018-12-03 DIAGNOSIS — I48.19 PERSISTENT ATRIAL FIBRILLATION (HCC): ICD-10-CM

## 2018-12-03 PROCEDURE — 3008F BODY MASS INDEX DOCD: CPT | Performed by: FAMILY MEDICINE

## 2018-12-03 PROCEDURE — 99214 OFFICE O/P EST MOD 30 MIN: CPT | Performed by: FAMILY MEDICINE

## 2018-12-03 PROCEDURE — 1160F RVW MEDS BY RX/DR IN RCRD: CPT | Performed by: FAMILY MEDICINE

## 2018-12-03 NOTE — PROGRESS NOTES
Assessment/Plan: Patient here today for six month follow up for hypertension     No problem-specific Assessment & Plan notes found for this encounter  Diagnoses and all orders for this visit:    Benign hypertension with CKD (chronic kidney disease) stage III (HCC)    Persistent atrial fibrillation (HCC)    Abnormal prostate exam    Hypercholesteremia          Subjective:      Patient ID: France Taylor is a 72 y o  male  Follow up  Seen by Urology, Nephrology and Cardiology  Colonoscopy 3 years ago ([place]), repeat in 10 years  The following portions of the patient's history were reviewed and updated as appropriate: allergies, current medications, past family history, past medical history, past social history, past surgical history and problem list     Current Outpatient Prescriptions:     amLODIPine (NORVASC) 10 mg tablet, Take 1 tablet (10 mg total) by mouth daily, Disp: 180 tablet, Rfl: 0    Ascorbic Acid (VITAMIN C) 500 MG/5ML LIQD, Take 1 tablet by mouth daily, Disp: , Rfl:     atenolol (TENORMIN) 25 mg tablet, TAKE 3 TABLETS BY MOUTH DAILY, (2 TABLETS IN THE MORNING AND 1 TABLET IN THE EVENING), Disp: 270 tablet, Rfl: 3    Cholecalciferol (VITAMIN D) 2000 units tablet, Take 2,000 Units by mouth daily, Disp: , Rfl:     cloNIDine (CATAPRES) 0 1 mg tablet, Take 1 tablet by mouth 2 (two) times a day, Disp: , Rfl:     niacin 500 mg tablet, Take 1 tablet by mouth daily, Disp: , Rfl:     Omega-3 Fatty Acids (FISH OIL) 645 MG CAPS, Take 1 capsule by mouth daily, Disp: , Rfl:     PRADAXA 150 MG capsu, TAKE 1 CAPSULE TWICE DAILY  , Disp: 180 capsule, Rfl: 3    Review of Systems   Constitutional: Negative  HENT: Negative  Eyes: Negative  Respiratory: Negative  Cardiovascular: Negative  Gastrointestinal: Negative  Genitourinary: Negative  Musculoskeletal: Negative  Skin: Negative  Neurological: Negative  Psychiatric/Behavioral: Negative            Objective:      /74 (BP Location: Left arm, Patient Position: Sitting, Cuff Size: Standard)   Pulse 77   Temp 97 6 °F (36 4 °C) (Oral)   Ht 5' 4" (1 626 m)   Wt 74 5 kg (164 lb 3 2 oz)   SpO2 97%   BMI 28 18 kg/m²          Physical Exam   Constitutional: He is oriented to person, place, and time  He appears well-developed and well-nourished  HENT:   Head: Normocephalic and atraumatic  Right Ear: External ear normal    Left Ear: External ear normal    Nose: Nose normal    Mouth/Throat: Oropharynx is clear and moist    Eyes: Pupils are equal, round, and reactive to light  Conjunctivae and EOM are normal    Neck: Normal range of motion  Neck supple  Cardiovascular: Normal rate, normal heart sounds and intact distal pulses  Lorella Geri / Lorella Geri  Pulmonary/Chest: Effort normal and breath sounds normal    Abdominal: Soft  Bowel sounds are normal    Neurological: He is alert and oriented to person, place, and time  Skin: Skin is warm and dry  Psychiatric: He has a normal mood and affect   His behavior is normal  Judgment and thought content normal

## 2019-01-22 DIAGNOSIS — I10 ESSENTIAL (PRIMARY) HYPERTENSION: Primary | ICD-10-CM

## 2019-01-22 RX ORDER — CLONIDINE HYDROCHLORIDE 0.1 MG/1
0.1 TABLET ORAL 2 TIMES DAILY
Qty: 180 TABLET | Refills: 3 | Status: SHIPPED | OUTPATIENT
Start: 2019-01-22 | End: 2020-01-16

## 2019-01-24 DIAGNOSIS — I15.9 SECONDARY HYPERTENSION: ICD-10-CM

## 2019-01-24 RX ORDER — AMLODIPINE BESYLATE 10 MG/1
10 TABLET ORAL DAILY
Qty: 180 TABLET | Refills: 0 | Status: SHIPPED | OUTPATIENT
Start: 2019-01-24 | End: 2019-02-18 | Stop reason: DRUGHIGH

## 2019-02-06 ENCOUNTER — TELEPHONE (OUTPATIENT)
Dept: NEPHROLOGY | Facility: CLINIC | Age: 74
End: 2019-02-06

## 2019-02-07 ENCOUNTER — TELEPHONE (OUTPATIENT)
Dept: NEPHROLOGY | Facility: CLINIC | Age: 74
End: 2019-02-07

## 2019-02-07 NOTE — TELEPHONE ENCOUNTER
Called patient to schedule his April follow up  He stated that he is torn and he is having mixed emotions about being seen  He did not go into much more detail then that  Patient said he will call back tomorrow or the day after to let me know what he decides

## 2019-02-12 ENCOUNTER — TELEPHONE (OUTPATIENT)
Dept: NEPHROLOGY | Facility: CLINIC | Age: 74
End: 2019-02-12

## 2019-02-12 NOTE — TELEPHONE ENCOUNTER
Third attempt - called and left a voicemail in regards to scheduling patient for his April follow up appointment

## 2019-02-18 ENCOUNTER — TELEPHONE (OUTPATIENT)
Dept: CARDIOLOGY CLINIC | Facility: CLINIC | Age: 74
End: 2019-02-18

## 2019-02-18 DIAGNOSIS — I10 ESSENTIAL HYPERTENSION, MALIGNANT: Primary | ICD-10-CM

## 2019-02-18 RX ORDER — AMLODIPINE BESYLATE 10 MG/1
10 TABLET ORAL 2 TIMES DAILY
Qty: 60 TABLET | Refills: 11 | Status: SHIPPED | OUTPATIENT
Start: 2019-02-18 | End: 2019-06-05

## 2019-02-18 NOTE — TELEPHONE ENCOUNTER
Pt called stating he went back to taking his amlodipine twice a day instead of once a day  Pt states his BP was ranging 140-150/90s  Since starting Amlodipine 10mg BID his average 120s/70s  Do to the change pt needs new script sent to pharmacy   Please advise this is ok, If so script will follow for approval

## 2019-02-22 ENCOUNTER — TELEPHONE (OUTPATIENT)
Dept: CARDIOLOGY CLINIC | Facility: CLINIC | Age: 74
End: 2019-02-22

## 2019-02-22 DIAGNOSIS — Z79.899 ENCOUNTER FOR LONG-TERM (CURRENT) USE OF MEDICATIONS: ICD-10-CM

## 2019-02-22 DIAGNOSIS — I10 ESSENTIAL HYPERTENSION: Primary | ICD-10-CM

## 2019-02-22 DIAGNOSIS — E78.00 HYPERCHOLESTEREMIA: ICD-10-CM

## 2019-02-22 RX ORDER — AMLODIPINE BESYLATE 10 MG/1
10 TABLET ORAL 2 TIMES DAILY
Qty: 60 TABLET | Refills: 11 | Status: SHIPPED | OUTPATIENT
Start: 2019-02-22 | End: 2020-02-25 | Stop reason: SDUPTHER

## 2019-03-20 DIAGNOSIS — I10 ESSENTIAL (PRIMARY) HYPERTENSION: ICD-10-CM

## 2019-03-20 RX ORDER — ATENOLOL 25 MG/1
TABLET ORAL
Qty: 270 TABLET | Refills: 2 | Status: SHIPPED | OUTPATIENT
Start: 2019-03-20 | End: 2019-05-02

## 2019-04-01 ENCOUNTER — APPOINTMENT (OUTPATIENT)
Dept: LAB | Facility: HOSPITAL | Age: 74
End: 2019-04-01
Attending: INTERNAL MEDICINE
Payer: COMMERCIAL

## 2019-04-01 ENCOUNTER — HOSPITAL ENCOUNTER (OUTPATIENT)
Dept: RADIOLOGY | Facility: HOSPITAL | Age: 74
Discharge: HOME | End: 2019-04-01
Attending: INTERNAL MEDICINE
Payer: COMMERCIAL

## 2019-04-01 ENCOUNTER — TELEPHONE (OUTPATIENT)
Dept: CARDIOLOGY CLINIC | Facility: CLINIC | Age: 74
End: 2019-04-01

## 2019-04-01 DIAGNOSIS — E78.00 HYPERCHOLESTEREMIA: ICD-10-CM

## 2019-04-01 DIAGNOSIS — E55.9 VITAMIN D DEFICIENCY: ICD-10-CM

## 2019-04-01 DIAGNOSIS — N18.30 STAGE 3 CHRONIC KIDNEY DISEASE (HCC): ICD-10-CM

## 2019-04-01 DIAGNOSIS — E87.8 LOW BICARBONATE: ICD-10-CM

## 2019-04-01 DIAGNOSIS — E78.5 HYPERLIPIDEMIA, UNSPECIFIED HYPERLIPIDEMIA TYPE: Primary | ICD-10-CM

## 2019-04-01 LAB
25(OH)D3 SERPL-MCNC: 34.2 NG/ML (ref 30–100)
ANION GAP SERPL CALCULATED.3IONS-SCNC: 3 MMOL/L (ref 4–13)
BUN SERPL-MCNC: 21 MG/DL (ref 5–25)
CALCIUM SERPL-MCNC: 9 MG/DL (ref 8.3–10.1)
CHLORIDE SERPL-SCNC: 109 MMOL/L (ref 100–108)
CHOLEST SERPL-MCNC: 237 MG/DL (ref 50–200)
CO2 SERPL-SCNC: 24 MMOL/L (ref 21–32)
CREAT SERPL-MCNC: 1.75 MG/DL (ref 0.6–1.3)
CREAT UR-MCNC: 25.4 MG/DL
ERYTHROCYTE [DISTWIDTH] IN BLOOD BY AUTOMATED COUNT: 12.7 % (ref 11.6–15.1)
GFR SERPL CREATININE-BSD FRML MDRD: 38 ML/MIN/1.73SQ M
GLUCOSE P FAST SERPL-MCNC: 101 MG/DL (ref 65–99)
HCT VFR BLD AUTO: 48.6 % (ref 36.5–49.3)
HDLC SERPL-MCNC: 75 MG/DL (ref 40–60)
HGB BLD-MCNC: 17 G/DL (ref 12–17)
LDLC SERPL CALC-MCNC: 146 MG/DL (ref 0–100)
MCH RBC QN AUTO: 33.8 PG (ref 26.8–34.3)
MCHC RBC AUTO-ENTMCNC: 35 G/DL (ref 31.4–37.4)
MCV RBC AUTO: 97 FL (ref 82–98)
PHOSPHATE SERPL-MCNC: 3.3 MG/DL (ref 2.3–4.1)
PLATELET # BLD AUTO: 250 THOUSANDS/UL (ref 149–390)
PMV BLD AUTO: 9 FL (ref 8.9–12.7)
POTASSIUM SERPL-SCNC: 4.5 MMOL/L (ref 3.5–5.3)
PROT UR-MCNC: 10 MG/DL
PROT/CREAT UR: 0.39 MG/G{CREAT} (ref 0–0.1)
PTH-INTACT SERPL-MCNC: 186.1 PG/ML (ref 18.4–80.1)
RBC # BLD AUTO: 5.03 MILLION/UL (ref 3.88–5.62)
SODIUM SERPL-SCNC: 136 MMOL/L (ref 136–145)
TRIGL SERPL-MCNC: 82 MG/DL
WBC # BLD AUTO: 4.31 THOUSAND/UL (ref 4.31–10.16)

## 2019-04-01 PROCEDURE — 84100 ASSAY OF PHOSPHORUS: CPT

## 2019-04-01 PROCEDURE — 84156 ASSAY OF PROTEIN URINE: CPT

## 2019-04-01 PROCEDURE — 80061 LIPID PANEL: CPT

## 2019-04-01 PROCEDURE — 82570 ASSAY OF URINE CREATININE: CPT

## 2019-04-01 PROCEDURE — 36415 COLL VENOUS BLD VENIPUNCTURE: CPT

## 2019-04-01 PROCEDURE — 80048 BASIC METABOLIC PNL TOTAL CA: CPT

## 2019-04-01 PROCEDURE — 83970 ASSAY OF PARATHORMONE: CPT

## 2019-04-01 PROCEDURE — 85027 COMPLETE CBC AUTOMATED: CPT

## 2019-04-01 PROCEDURE — 82306 VITAMIN D 25 HYDROXY: CPT

## 2019-04-01 PROCEDURE — 76770 US EXAM ABDO BACK WALL COMP: CPT

## 2019-04-01 RX ORDER — ATORVASTATIN CALCIUM 20 MG/1
20 TABLET, FILM COATED ORAL DAILY
Qty: 30 TABLET | Refills: 6 | Status: SHIPPED | OUTPATIENT
Start: 2019-04-01 | End: 2019-10-31 | Stop reason: SDUPTHER

## 2019-04-03 DIAGNOSIS — I48.91 ATRIAL FIBRILLATION, UNSPECIFIED TYPE (HCC): ICD-10-CM

## 2019-04-03 RX ORDER — DABIGATRAN ETEXILATE 150 MG/1
150 CAPSULE, COATED PELLETS ORAL 2 TIMES DAILY
Qty: 180 CAPSULE | Refills: 0 | Status: SHIPPED | OUTPATIENT
Start: 2019-04-03 | End: 2019-07-15

## 2019-04-04 ENCOUNTER — HOSPITAL ENCOUNTER (OUTPATIENT)
Dept: NON INVASIVE DIAGNOSTICS | Facility: CLINIC | Age: 74
Discharge: HOME | End: 2019-04-04
Payer: COMMERCIAL

## 2019-04-04 DIAGNOSIS — I48.91 ATRIAL FIBRILLATION, UNSPECIFIED TYPE (HCC): ICD-10-CM

## 2019-04-04 DIAGNOSIS — I10 ESSENTIAL (PRIMARY) HYPERTENSION: ICD-10-CM

## 2019-04-04 DIAGNOSIS — I35.9 AORTIC VALVE DISEASE: ICD-10-CM

## 2019-04-04 PROCEDURE — 93306 TTE W/DOPPLER COMPLETE: CPT | Performed by: INTERNAL MEDICINE

## 2019-04-04 PROCEDURE — 93306 TTE W/DOPPLER COMPLETE: CPT

## 2019-04-06 ENCOUNTER — TELEPHONE (OUTPATIENT)
Dept: NEPHROLOGY | Facility: CLINIC | Age: 74
End: 2019-04-06

## 2019-04-12 ENCOUNTER — OFFICE VISIT (OUTPATIENT)
Dept: CARDIOLOGY CLINIC | Facility: CLINIC | Age: 74
End: 2019-04-12
Payer: COMMERCIAL

## 2019-04-12 VITALS
HEIGHT: 64 IN | DIASTOLIC BLOOD PRESSURE: 88 MMHG | HEART RATE: 72 BPM | SYSTOLIC BLOOD PRESSURE: 128 MMHG | WEIGHT: 166.8 LBS | BODY MASS INDEX: 28.48 KG/M2

## 2019-04-12 DIAGNOSIS — I35.9 AORTIC VALVE DISEASE: ICD-10-CM

## 2019-04-12 DIAGNOSIS — E78.00 PURE HYPERCHOLESTEROLEMIA: ICD-10-CM

## 2019-04-12 DIAGNOSIS — I10 ESSENTIAL HYPERTENSION: ICD-10-CM

## 2019-04-12 DIAGNOSIS — I48.91 ATRIAL FIBRILLATION, UNSPECIFIED TYPE (HCC): Primary | ICD-10-CM

## 2019-04-12 PROCEDURE — 99214 OFFICE O/P EST MOD 30 MIN: CPT | Performed by: INTERNAL MEDICINE

## 2019-04-12 PROCEDURE — 93000 ELECTROCARDIOGRAM COMPLETE: CPT | Performed by: INTERNAL MEDICINE

## 2019-05-01 ENCOUNTER — TELEPHONE (OUTPATIENT)
Dept: NEPHROLOGY | Facility: CLINIC | Age: 74
End: 2019-05-01

## 2019-05-02 ENCOUNTER — OFFICE VISIT (OUTPATIENT)
Dept: NEPHROLOGY | Facility: CLINIC | Age: 74
End: 2019-05-02
Payer: COMMERCIAL

## 2019-05-02 VITALS
WEIGHT: 167.6 LBS | RESPIRATION RATE: 20 BRPM | BODY MASS INDEX: 27.92 KG/M2 | HEART RATE: 75 BPM | HEIGHT: 65 IN | SYSTOLIC BLOOD PRESSURE: 118 MMHG | DIASTOLIC BLOOD PRESSURE: 72 MMHG

## 2019-05-02 DIAGNOSIS — N25.81 SECONDARY HYPERPARATHYROIDISM OF RENAL ORIGIN (HCC): ICD-10-CM

## 2019-05-02 DIAGNOSIS — N18.30 STAGE 3 CHRONIC KIDNEY DISEASE (HCC): Primary | ICD-10-CM

## 2019-05-02 DIAGNOSIS — R60.0 BILATERAL LEG EDEMA: ICD-10-CM

## 2019-05-02 DIAGNOSIS — I10 ESSENTIAL (PRIMARY) HYPERTENSION: ICD-10-CM

## 2019-05-02 DIAGNOSIS — N18.30 BENIGN HYPERTENSION WITH CKD (CHRONIC KIDNEY DISEASE) STAGE III (HCC): ICD-10-CM

## 2019-05-02 DIAGNOSIS — R80.1 PERSISTENT PROTEINURIA: ICD-10-CM

## 2019-05-02 DIAGNOSIS — I12.9 BENIGN HYPERTENSION WITH CKD (CHRONIC KIDNEY DISEASE) STAGE III (HCC): ICD-10-CM

## 2019-05-02 PROCEDURE — 99214 OFFICE O/P EST MOD 30 MIN: CPT | Performed by: INTERNAL MEDICINE

## 2019-05-02 RX ORDER — ATENOLOL 25 MG/1
TABLET ORAL
Qty: 270 TABLET | Refills: 2 | Status: SHIPPED | OUTPATIENT
Start: 2019-05-02 | End: 2019-12-06 | Stop reason: SDUPTHER

## 2019-06-05 ENCOUNTER — OFFICE VISIT (OUTPATIENT)
Dept: FAMILY MEDICINE CLINIC | Facility: CLINIC | Age: 74
End: 2019-06-05
Payer: COMMERCIAL

## 2019-06-05 VITALS
HEIGHT: 65 IN | DIASTOLIC BLOOD PRESSURE: 74 MMHG | OXYGEN SATURATION: 97 % | WEIGHT: 164.2 LBS | TEMPERATURE: 97.6 F | HEART RATE: 80 BPM | SYSTOLIC BLOOD PRESSURE: 118 MMHG | BODY MASS INDEX: 27.36 KG/M2

## 2019-06-05 DIAGNOSIS — R60.0 BILATERAL LEG EDEMA: ICD-10-CM

## 2019-06-05 DIAGNOSIS — I48.19 PERSISTENT ATRIAL FIBRILLATION (HCC): ICD-10-CM

## 2019-06-05 DIAGNOSIS — N42.9 ABNORMAL PROSTATE BY PALPATION: ICD-10-CM

## 2019-06-05 DIAGNOSIS — Z00.00 MEDICARE ANNUAL WELLNESS VISIT, INITIAL: Primary | ICD-10-CM

## 2019-06-05 DIAGNOSIS — I12.9 BENIGN HYPERTENSION WITH CKD (CHRONIC KIDNEY DISEASE) STAGE III (HCC): ICD-10-CM

## 2019-06-05 DIAGNOSIS — N18.30 BENIGN HYPERTENSION WITH CKD (CHRONIC KIDNEY DISEASE) STAGE III (HCC): ICD-10-CM

## 2019-06-05 PROCEDURE — 1036F TOBACCO NON-USER: CPT | Performed by: FAMILY MEDICINE

## 2019-06-05 PROCEDURE — 1101F PT FALLS ASSESS-DOCD LE1/YR: CPT | Performed by: FAMILY MEDICINE

## 2019-06-05 PROCEDURE — 1170F FXNL STATUS ASSESSED: CPT | Performed by: FAMILY MEDICINE

## 2019-06-05 PROCEDURE — 3725F SCREEN DEPRESSION PERFORMED: CPT | Performed by: FAMILY MEDICINE

## 2019-06-05 PROCEDURE — 1125F AMNT PAIN NOTED PAIN PRSNT: CPT | Performed by: FAMILY MEDICINE

## 2019-06-05 PROCEDURE — G0438 PPPS, INITIAL VISIT: HCPCS | Performed by: FAMILY MEDICINE

## 2019-06-05 PROCEDURE — 99214 OFFICE O/P EST MOD 30 MIN: CPT | Performed by: FAMILY MEDICINE

## 2019-07-15 DIAGNOSIS — I48.91 ATRIAL FIBRILLATION, UNSPECIFIED TYPE (HCC): ICD-10-CM

## 2019-07-16 RX ORDER — DABIGATRAN ETEXILATE 150 MG/1
150 CAPSULE, COATED PELLETS ORAL 2 TIMES DAILY
Qty: 180 CAPSULE | Refills: 2 | Status: SHIPPED | OUTPATIENT
Start: 2019-07-16 | End: 2020-04-02 | Stop reason: SDUPTHER

## 2019-09-04 ENCOUNTER — TELEPHONE (OUTPATIENT)
Dept: NEPHROLOGY | Facility: CLINIC | Age: 74
End: 2019-09-04

## 2019-10-16 ENCOUNTER — OFFICE VISIT (OUTPATIENT)
Dept: FAMILY MEDICINE CLINIC | Facility: CLINIC | Age: 74
End: 2019-10-16
Payer: COMMERCIAL

## 2019-10-16 VITALS
TEMPERATURE: 98.3 F | SYSTOLIC BLOOD PRESSURE: 118 MMHG | OXYGEN SATURATION: 97 % | HEIGHT: 65 IN | DIASTOLIC BLOOD PRESSURE: 90 MMHG | WEIGHT: 161.6 LBS | HEART RATE: 97 BPM | BODY MASS INDEX: 26.92 KG/M2

## 2019-10-16 DIAGNOSIS — I48.19 PERSISTENT ATRIAL FIBRILLATION (HCC): ICD-10-CM

## 2019-10-16 DIAGNOSIS — M54.2 NECK PAIN ON RIGHT SIDE: Primary | ICD-10-CM

## 2019-10-16 DIAGNOSIS — J30.2 SEASONAL ALLERGIES: ICD-10-CM

## 2019-10-16 DIAGNOSIS — S16.1XXA CERVICAL STRAIN, INITIAL ENCOUNTER: ICD-10-CM

## 2019-10-16 DIAGNOSIS — I12.9 BENIGN HYPERTENSION WITH CKD (CHRONIC KIDNEY DISEASE) STAGE III (HCC): ICD-10-CM

## 2019-10-16 DIAGNOSIS — N18.30 BENIGN HYPERTENSION WITH CKD (CHRONIC KIDNEY DISEASE) STAGE III (HCC): ICD-10-CM

## 2019-10-16 DIAGNOSIS — H61.20 WAX IN EAR: ICD-10-CM

## 2019-10-16 DIAGNOSIS — M99.01 CERVICAL SOMATIC DYSFUNCTION: ICD-10-CM

## 2019-10-16 PROCEDURE — 99214 OFFICE O/P EST MOD 30 MIN: CPT | Performed by: FAMILY MEDICINE

## 2019-10-16 PROCEDURE — 98925 OSTEOPATH MANJ 1-2 REGIONS: CPT | Performed by: FAMILY MEDICINE

## 2019-10-16 PROCEDURE — 3008F BODY MASS INDEX DOCD: CPT | Performed by: FAMILY MEDICINE

## 2019-10-16 NOTE — PROGRESS NOTES
Chief Complaint   Patient presents with    Sore Throat     started 10 days ago    Ear Fullness     right ear feels clogged    Nasal Congestion       Assessment/Plan:  No q tips  Diagnoses and all orders for this visit:    Neck pain on right side    Wax in ear    Seasonal allergies    Cervical somatic dysfunction    Cervical strain, initial encounter    Persistent atrial fibrillation    Benign hypertension with CKD (chronic kidney disease) stage III ([facility])          Subjective:      Patient ID: Irasema Roberts is a 69 y o  male  Sick 10 days  Right upper neck sore, right ear fullness and some nasal congestion  BP and HR controlled  The following portions of the patient's history were reviewed and updated as appropriate: allergies, current medications, past family history, past medical history, past social history, past surgical history and problem list     Review of Systems   Constitutional: Negative  HENT: Positive for hearing loss  Right ear  Eyes: Negative  Respiratory: Negative  Cardiovascular: Negative  Gastrointestinal: Negative  Genitourinary: Negative  Musculoskeletal: Positive for neck pain and neck stiffness  Skin: Negative  Neurological: Negative  Psychiatric/Behavioral: Negative            Objective:      /90 (BP Location: Left arm, Patient Position: Sitting, Cuff Size: Standard)   Pulse 97   Temp 98 3 °F (36 8 °C) (Oral)   Ht 5' 4 5" (1 638 m)   Wt 73 3 kg (161 lb 9 6 oz)   SpO2 97%   BMI 27 31 kg/m²       Current Outpatient Medications:     amLODIPine (NORVASC) 10 mg tablet, Take 1 tablet (10 mg total) by mouth 2 (two) times a day, Disp: 60 tablet, Rfl: 11    Ascorbic Acid (VITAMIN C) 500 MG/5ML LIQD, Take 1 tablet by mouth daily, Disp: , Rfl:     atenolol (TENORMIN) 25 mg tablet, Take 2 tablets in the morning and 1 tablet in the evening, Disp: 270 tablet, Rfl: 2    atorvastatin (LIPITOR) 20 mg tablet, Take 1 tablet (20 mg total) by mouth daily, Disp: 30 tablet, Rfl: 6    Cholecalciferol (VITAMIN D) 2000 units tablet, Take 2,000 Units by mouth daily, Disp: , Rfl:     cloNIDine (CATAPRES) 0 1 mg tablet, Take 1 tablet (0 1 mg total) by mouth 2 (two) times a day, Disp: 180 tablet, Rfl: 3    dabigatran etexilate (PRADAXA) 150 mg capsu, Take 1 capsule (150 mg total) by mouth 2 (two) times a day, Disp: 180 capsule, Rfl: 2     Physical Exam   Constitutional: He is oriented to person, place, and time  He appears well-developed and well-nourished  HENT:   Head: Normocephalic and atraumatic  Left Ear: External ear normal    Mouth/Throat: Oropharynx is clear and moist    Wax packed  Mild nasal congestion  Eyes: Pupils are equal, round, and reactive to light  Conjunctivae and EOM are normal    Neck: Normal range of motion  Neck supple  Cardiovascular: Normal rate, normal heart sounds and intact distal pulses  I / I  Pulmonary/Chest: Effort normal and breath sounds normal    Abdominal: Soft  Bowel sounds are normal    Musculoskeletal:   Tender right upper cervical   C0 rotated left and tender  Neurological: He is alert and oriented to person, place, and time  He has normal reflexes  Skin: Skin is warm and dry  Psychiatric: He has a normal mood and affect   His behavior is normal  Judgment and thought content normal

## 2019-10-18 PROCEDURE — 69210 REMOVE IMPACTED EAR WAX UNI: CPT | Performed by: FAMILY MEDICINE

## 2019-10-18 NOTE — PROGRESS NOTES
Ear cerumen removal  Date/Time: 10/18/2019 6:15 AM  Performed by: Sera Adan DO  Authorized by: Sera Adan DO     Patient location:  Clinic  Consent:     Consent obtained:  Verbal    Consent given by:  Patient  Universal protocol:     Procedure explained and questions answered to patient or proxy's satisfaction: yes    Procedure details:     Location:  R ear    Procedure type: irrigation only    Post-procedure details:     Complication:  None    Hearing quality:  Normal    Patient tolerance of procedure:   Tolerated well, no immediate complications

## 2019-10-18 NOTE — PROGRESS NOTES
OMT  Performed by: Niranjan Robison DO  Authorized by: Niranjan Robison, DO     Verbal consent obtained?: Yes    Risks and benefits: Risks, benefits and alternatives were discussed    Consent given by:  Patient  Procedure Details:     Region evaluated and treated:  Cervical    Cervical Details:     Examination Method:  Asymmetry, Misalignment, Crepitation, Defects, Masses and Tenderness, Pain    Treatment Method:  High Velocity, Low Amplitude Treatment and Soft Tissue Treatment    Response:  Resolved - The somatic dysfunction is completely resolved without evidence of it ever having been present      Total Regions Treated:  1

## 2019-10-29 ENCOUNTER — TELEPHONE (OUTPATIENT)
Dept: NEPHROLOGY | Facility: CLINIC | Age: 74
End: 2019-10-29

## 2019-10-30 ENCOUNTER — OFFICE VISIT (OUTPATIENT)
Dept: NEPHROLOGY | Facility: CLINIC | Age: 74
End: 2019-10-30
Payer: COMMERCIAL

## 2019-10-30 VITALS
SYSTOLIC BLOOD PRESSURE: 122 MMHG | BODY MASS INDEX: 26.66 KG/M2 | HEIGHT: 65 IN | WEIGHT: 160 LBS | DIASTOLIC BLOOD PRESSURE: 66 MMHG

## 2019-10-30 DIAGNOSIS — N18.30 BENIGN HYPERTENSION WITH CKD (CHRONIC KIDNEY DISEASE) STAGE III (HCC): ICD-10-CM

## 2019-10-30 DIAGNOSIS — N25.81 SECONDARY HYPERPARATHYROIDISM OF RENAL ORIGIN (HCC): ICD-10-CM

## 2019-10-30 DIAGNOSIS — I12.9 BENIGN HYPERTENSION WITH CKD (CHRONIC KIDNEY DISEASE) STAGE III (HCC): ICD-10-CM

## 2019-10-30 DIAGNOSIS — R60.0 BILATERAL LEG EDEMA: ICD-10-CM

## 2019-10-30 DIAGNOSIS — R80.1 PERSISTENT PROTEINURIA: ICD-10-CM

## 2019-10-30 DIAGNOSIS — N18.30 STAGE 3 CHRONIC KIDNEY DISEASE (HCC): Primary | ICD-10-CM

## 2019-10-30 PROCEDURE — 99214 OFFICE O/P EST MOD 30 MIN: CPT | Performed by: INTERNAL MEDICINE

## 2019-10-30 NOTE — PROGRESS NOTES
NEPHROLOGY OUTPATIENT PROGRESS NOTE   Roma Shown 69 y o  male MRN: [id number]  DATE: 10/30/2019  Reason for visit:   Chief Complaint   Patient presents with    Follow-up    Chronic Kidney Disease     ASSESSMENT and PLAN:  Likely CKD stage 3 with baseline creatinine 1 7 going back to 2015  -last creatinine  1 7 in April 2019 overall stable at baseline  He has not done repeat labs since then  -CKD suspected to be secondary to long-term hypertension causing hypertensive arteriosclerosis  -avoid nephrotoxins or NSAIDs  -urinalysis on multiple occasion shows no hematuria or proteinuria in the past   -repeat BMP now and before next visit     -renal ultrasound in April 2019 shows right kidney 8 2 cm, left kidney 9 3 cm, diffusely echogenic renal parenchyma suggestive of CKD, no hydronephrosis or stones  Simple cyst in both kidneys      Proteinuria  -last UPC ratio 390 mg in April 2019 worsened from prior value 150 mg  -this could be secondary to underlying hypertension   -continue to closely monitor  No repeat labs available since then  Check UPC ratio now   -I have again recommended to start ACE-inhibitor although he clearly refuses due to potential side effects and possible prior history of "ill effects with trying it"  I also explained that with worsening proteinuria remains high risk of worsening renal failure in the future and he verbalized understanding of my discussion with him      Hypertension  -blood pressure is well controlled in the office today   Currently remains on amlodipine 10 mg p o  b i d , atenolol 25 mg b i d  , and clonidine 0 1 mg p o  B i d   -I have again recommended to reduce amlodipine to 10 mg daily due to ongoing leg edema issues although he clearly refuses to do so    -I have recommended to reduce amlodipine and start ACE-inhibitor which clearly refuses at this time      Bilateral leg edema  -patient said this has been chronic and stable issue   ? Due to high-dose amlodipine versus component of moderate TR contributing  -patient does not seem to be in respiratory distress or overt CHF   Patient is not on any diuretic  His weight has reduced since last visit  -patient refusing to reduce amlodipine   -elevate legs, compression stockings if tolerated     Vitamin-D deficiency, improved with replacement  Vitamin-D level 34 2 in April 2019 improved from prior value 20 5   Currently remains on vitamin-D 2000 units p o  Daily  -repeat vitamin-D 25 hydroxy level     Secondary hyperparathyroidism, PTH level 186 in April 2019  Vitamin-D level as mentioned above  Continue current vitamin-D supplement  Monitor PTH closely, phosphorus restricted diet  If PTH remains persistently elevated, consider calcitriol during next visit  Repeat PTH before next visit  Diagnoses and all orders for this visit:    Stage 3 chronic kidney disease ([facility] )  -     Basic metabolic panel  -     CBC  -     Magnesium  -     Phosphorus  -     Vitamin D 25 hydroxy  -     Basic metabolic panel; Future  -     CBC; Future  -     Magnesium; Future  -     Phosphorus; Future  -     Vitamin D 25 hydroxy; Future    Benign hypertension with CKD (chronic kidney disease) stage III (HCC)    Secondary hyperparathyroidism of renal origin ([facility] )  -     PTH, intact  -     Vitamin D 25 hydroxy  -     PTH, intact; Future  -     Vitamin D 25 hydroxy; Future    Bilateral leg edema    Persistent proteinuria  -     Protein / creatinine ratio, urine  -     Protein / creatinine ratio, urine; Future        SUBJECTIVE / HPI:  Lamin Alexandre a 69 y  o  year old male with medical issues of hypertension for 20 years, paroxysmal AFib, CKD stage 3 with baseline creatinine 1 7, moderate MR/TR, bicuspid aortic valve who presents for regular follow-up for renal failure  Patient's previous creatinine 1 7 in 2015 and no other labs available for almost three years until creatinine 1 7 in March 2018  last creatinine 1 7 in April 2019 overall stable at baseline  no recent lab values available since then  He has not done any labs  He has chronic stable leg edema  denies any recent NSAID exposure  Kathe Heard has nocturia although denies any other urinary complaint   He denies any chest pain or shortness of breath, no nausea, vomiting or diarrhea      Denies any obvious kidney issues in the past  Kathe Heard remains on stable antihypertensive regimen   No recent change in his medications  REVIEW OF SYSTEMS:  More than 10 point review of systems were obtained and discussed in length with the patient  Complete review of systems were negative / unremarkable except mentioned above  PHYSICAL EXAM:  Vitals:    10/30/19 1247 10/30/19 1311   BP:  122/66   Weight: 72 6 kg (160 lb)    Height: 5' 4 5" (1 638 m)      Body mass index is 27 04 kg/m²  Physical Exam   Constitutional: He is oriented to person, place, and time  He appears well-developed and well-nourished  HENT:   Head: Normocephalic and atraumatic  Right Ear: External ear normal    Left Ear: External ear normal    Nose: Nose normal    Eyes: Pupils are equal, round, and reactive to light  Conjunctivae and EOM are normal  No scleral icterus  Neck: Neck supple  No JVD present  Cardiovascular: Normal rate and normal heart sounds  Pulmonary/Chest: Effort normal and breath sounds normal  No respiratory distress  He has no wheezes  He has no rales  Abdominal: Soft  Bowel sounds are normal  He exhibits no distension  There is no tenderness  Musculoskeletal: He exhibits edema (2+ edema in legs)  He exhibits no tenderness  Neurological: He is alert and oriented to person, place, and time  Skin: Skin is warm and dry  Psychiatric: He has a normal mood and affect  His behavior is normal    Vitals reviewed  PAST MEDICAL HISTORY:  Past Medical History:   Diagnosis Date    Atrial fibrillation ([address] )        PAST SURGICAL HISTORY:  History reviewed  No pertinent surgical history      SOCIAL HISTORY:  Social History Substance and Sexual Activity   Alcohol Use Yes    Alcohol/week: 4 0 standard drinks    Types: 4 Shots of liquor per week     Social History     Substance and Sexual Activity   Drug Use No     Social History     Tobacco Use   Smoking Status Never Smoker   Smokeless Tobacco Never Used       FAMILY HISTORY:  Family History   Problem Relation Age of Onset    Alzheimer's disease Mother     Coronary artery disease Father     Heart defect Father         congenital     Stroke Father     Other Father         cerebral hemorrhage        MEDICATIONS:    Current Outpatient Medications:     amLODIPine (NORVASC) 10 mg tablet, Take 1 tablet (10 mg total) by mouth 2 (two) times a day, Disp: 60 tablet, Rfl: 11    Ascorbic Acid (VITAMIN C) 500 MG/5ML LIQD, Take 1 tablet by mouth daily, Disp: , Rfl:     atenolol (TENORMIN) 25 mg tablet, Take 2 tablets in the morning and 1 tablet in the evening (Patient taking differently: Take 1tablets in the morning and 1 tablet in the evening), Disp: 270 tablet, Rfl: 2    atorvastatin (LIPITOR) 20 mg tablet, Take 1 tablet (20 mg total) by mouth daily, Disp: 30 tablet, Rfl: 6    Cholecalciferol (VITAMIN D) 2000 units tablet, Take 2,000 Units by mouth daily, Disp: , Rfl:     cloNIDine (CATAPRES) 0 1 mg tablet, Take 1 tablet (0 1 mg total) by mouth 2 (two) times a day, Disp: 180 tablet, Rfl: 3    dabigatran etexilate (PRADAXA) 150 mg capsu, Take 1 capsule (150 mg total) by mouth 2 (two) times a day, Disp: 180 capsule, Rfl: 2    Lab Results:   Results for orders placed or performed in visit on 04/01/19   Phosphorus   Result Value Ref Range    Phosphorus 3 3 2 3 - 4 1 mg/dL   CBC   Result Value Ref Range    WBC 4 31 4 31 - 10 16 Thousand/uL    RBC 5 03 3 88 - 5 62 Million/uL    Hemoglobin 17 0 12 0 - 17 0 g/dL    Hematocrit 48 6 36 5 - 49 3 %    MCV 97 82 - 98 fL    MCH 33 8 26 8 - 34 3 pg    MCHC 35 0 31 4 - 37 4 g/dL    RDW 12 7 11 6 - 15 1 %    Platelets 660 027 - 524 Thousands/uL MPV 9 0 8 9 - 12 7 fL   PTH, intact   Result Value Ref Range     1 (H) 18 4 - 80 1 pg/mL   Basic metabolic panel   Result Value Ref Range    Sodium 136 136 - 145 mmol/L    Potassium 4 5 3 5 - 5 3 mmol/L    Chloride 109 (H) 100 - 108 mmol/L    CO2 24 21 - 32 mmol/L    ANION GAP 3 (L) 4 - 13 mmol/L    BUN 21 5 - 25 mg/dL    Creatinine 1 75 (H) 0 60 - 1 30 mg/dL    Glucose, Fasting 101 (H) 65 - 99 mg/dL    Calcium 9 0 8 3 - 10 1 mg/dL    eGFR 38 ml/min/1 73sq m   Protein / creatinine ratio, urine   Result Value Ref Range    Creatinine, Ur 25 4 mg/dL    Protein Urine Random 10 mg/dL    Prot/Creat Ratio, Ur 0 39 (H) 0 00 - 0 10   Vitamin D 25 hydroxy   Result Value Ref Range    Vit D, 25-Hydroxy 34 2 30 0 - 100 0 ng/mL   Lipid Panel with Direct LDL reflex   Result Value Ref Range    Cholesterol 237 (H) 50 - 200 mg/dL    Triglycerides 82 <=150 mg/dL    HDL, Direct 75 (H) 40 - 60 mg/dL    LDL Calculated 146 (H) 0 - 100 mg/dL

## 2019-10-31 DIAGNOSIS — E78.5 HYPERLIPIDEMIA, UNSPECIFIED HYPERLIPIDEMIA TYPE: ICD-10-CM

## 2019-10-31 RX ORDER — ATORVASTATIN CALCIUM 20 MG/1
20 TABLET, FILM COATED ORAL DAILY
Qty: 30 TABLET | Refills: 11 | Status: SHIPPED | OUTPATIENT
Start: 2019-10-31 | End: 2020-01-10 | Stop reason: SDUPTHER

## 2019-11-05 ENCOUNTER — TELEPHONE (OUTPATIENT)
Dept: FAMILY MEDICINE CLINIC | Facility: CLINIC | Age: 74
End: 2019-11-05

## 2019-11-05 DIAGNOSIS — Z12.11 COLON CANCER SCREENING: Primary | ICD-10-CM

## 2019-12-06 DIAGNOSIS — I10 ESSENTIAL (PRIMARY) HYPERTENSION: ICD-10-CM

## 2019-12-06 RX ORDER — ATENOLOL 25 MG/1
TABLET ORAL
Qty: 270 TABLET | Refills: 2 | Status: SHIPPED | OUTPATIENT
Start: 2019-12-06 | End: 2020-09-24 | Stop reason: SDUPTHER

## 2020-01-10 DIAGNOSIS — E78.5 HYPERLIPIDEMIA, UNSPECIFIED HYPERLIPIDEMIA TYPE: ICD-10-CM

## 2020-01-10 RX ORDER — ATORVASTATIN CALCIUM 20 MG/1
20 TABLET, FILM COATED ORAL DAILY
Qty: 90 TABLET | Refills: 1 | Status: SHIPPED | OUTPATIENT
Start: 2020-01-10 | End: 2020-04-29 | Stop reason: SDUPTHER

## 2020-01-16 DIAGNOSIS — I10 ESSENTIAL (PRIMARY) HYPERTENSION: ICD-10-CM

## 2020-01-16 RX ORDER — CLONIDINE HYDROCHLORIDE 0.1 MG/1
TABLET ORAL
Qty: 180 TABLET | Refills: 0 | Status: SHIPPED | OUTPATIENT
Start: 2020-01-16 | End: 2020-04-16 | Stop reason: SDUPTHER

## 2020-02-04 DIAGNOSIS — N18.30 BENIGN HYPERTENSION WITH CKD (CHRONIC KIDNEY DISEASE) STAGE III (HCC): Primary | ICD-10-CM

## 2020-02-04 DIAGNOSIS — E78.00 PURE HYPERCHOLESTEROLEMIA: ICD-10-CM

## 2020-02-04 DIAGNOSIS — I12.9 BENIGN HYPERTENSION WITH CKD (CHRONIC KIDNEY DISEASE) STAGE III (HCC): Primary | ICD-10-CM

## 2020-02-25 DIAGNOSIS — I10 ESSENTIAL HYPERTENSION, MALIGNANT: ICD-10-CM

## 2020-02-25 RX ORDER — AMLODIPINE BESYLATE 10 MG/1
10 TABLET ORAL 2 TIMES DAILY
Qty: 60 TABLET | Refills: 11 | Status: SHIPPED | OUTPATIENT
Start: 2020-02-25 | End: 2021-03-01

## 2020-04-02 DIAGNOSIS — I48.91 ATRIAL FIBRILLATION, UNSPECIFIED TYPE (HCC): ICD-10-CM

## 2020-04-02 RX ORDER — DABIGATRAN ETEXILATE 150 MG/1
150 CAPSULE, COATED PELLETS ORAL 2 TIMES DAILY
Qty: 180 CAPSULE | Refills: 2 | Status: SHIPPED | OUTPATIENT
Start: 2020-04-02 | End: 2020-12-26

## 2020-04-06 ENCOUNTER — HOSPITAL ENCOUNTER (OUTPATIENT)
Dept: NON INVASIVE DIAGNOSTICS | Facility: CLINIC | Age: 75
Discharge: HOME | End: 2020-04-06
Payer: COMMERCIAL

## 2020-04-06 DIAGNOSIS — I10 ESSENTIAL HYPERTENSION: ICD-10-CM

## 2020-04-06 DIAGNOSIS — I35.9 AORTIC VALVE DISEASE: ICD-10-CM

## 2020-04-06 DIAGNOSIS — I48.91 ATRIAL FIBRILLATION, UNSPECIFIED TYPE (HCC): ICD-10-CM

## 2020-04-06 DIAGNOSIS — E78.00 PURE HYPERCHOLESTEROLEMIA: ICD-10-CM

## 2020-04-06 PROCEDURE — 93306 TTE W/DOPPLER COMPLETE: CPT | Performed by: INTERNAL MEDICINE

## 2020-04-06 PROCEDURE — 93306 TTE W/DOPPLER COMPLETE: CPT

## 2020-04-14 ENCOUNTER — TELEMEDICINE (OUTPATIENT)
Dept: CARDIOLOGY CLINIC | Facility: CLINIC | Age: 75
End: 2020-04-14
Payer: COMMERCIAL

## 2020-04-14 VITALS — HEIGHT: 65 IN | WEIGHT: 159 LBS | BODY MASS INDEX: 26.49 KG/M2

## 2020-04-14 DIAGNOSIS — I35.9 AORTIC VALVE DISEASE: ICD-10-CM

## 2020-04-14 DIAGNOSIS — I10 ESSENTIAL HYPERTENSION, MALIGNANT: Primary | ICD-10-CM

## 2020-04-14 DIAGNOSIS — E78.00 PURE HYPERCHOLESTEROLEMIA: ICD-10-CM

## 2020-04-14 DIAGNOSIS — I48.91 ATRIAL FIBRILLATION, UNSPECIFIED TYPE (HCC): ICD-10-CM

## 2020-04-14 PROCEDURE — 99442 PR PHYS/QHP TELEPHONE EVALUATION 11-20 MIN: CPT | Performed by: INTERNAL MEDICINE

## 2020-04-16 DIAGNOSIS — I10 ESSENTIAL (PRIMARY) HYPERTENSION: ICD-10-CM

## 2020-04-16 RX ORDER — CLONIDINE HYDROCHLORIDE 0.1 MG/1
0.1 TABLET ORAL EVERY 12 HOURS SCHEDULED
Qty: 180 TABLET | Refills: 3 | Status: SHIPPED | OUTPATIENT
Start: 2020-04-16 | End: 2021-04-01

## 2020-04-29 DIAGNOSIS — E78.5 HYPERLIPIDEMIA, UNSPECIFIED HYPERLIPIDEMIA TYPE: ICD-10-CM

## 2020-04-29 RX ORDER — ATORVASTATIN CALCIUM 20 MG/1
20 TABLET, FILM COATED ORAL DAILY
Qty: 90 TABLET | Refills: 1 | Status: SHIPPED | OUTPATIENT
Start: 2020-04-29 | End: 2021-01-19

## 2020-06-09 ENCOUNTER — OFFICE VISIT (OUTPATIENT)
Dept: FAMILY MEDICINE CLINIC | Facility: CLINIC | Age: 75
End: 2020-06-09
Payer: COMMERCIAL

## 2020-06-09 VITALS
HEART RATE: 96 BPM | OXYGEN SATURATION: 98 % | SYSTOLIC BLOOD PRESSURE: 114 MMHG | WEIGHT: 163.4 LBS | TEMPERATURE: 98.3 F | HEIGHT: 65 IN | DIASTOLIC BLOOD PRESSURE: 66 MMHG | BODY MASS INDEX: 27.22 KG/M2

## 2020-06-09 DIAGNOSIS — I12.9 BENIGN HYPERTENSION WITH CKD (CHRONIC KIDNEY DISEASE) STAGE III (HCC): Primary | ICD-10-CM

## 2020-06-09 DIAGNOSIS — Z00.00 MEDICARE ANNUAL WELLNESS VISIT, SUBSEQUENT: ICD-10-CM

## 2020-06-09 DIAGNOSIS — N18.30 STAGE 3 CHRONIC KIDNEY DISEASE (HCC): ICD-10-CM

## 2020-06-09 DIAGNOSIS — R39.89 ABNORMAL PROSTATE EXAM: ICD-10-CM

## 2020-06-09 DIAGNOSIS — N18.30 BENIGN HYPERTENSION WITH CKD (CHRONIC KIDNEY DISEASE) STAGE III (HCC): Primary | ICD-10-CM

## 2020-06-09 DIAGNOSIS — N25.81 SECONDARY HYPERPARATHYROIDISM OF RENAL ORIGIN (HCC): ICD-10-CM

## 2020-06-09 PROCEDURE — 1101F PT FALLS ASSESS-DOCD LE1/YR: CPT | Performed by: FAMILY MEDICINE

## 2020-06-09 PROCEDURE — 1125F AMNT PAIN NOTED PAIN PRSNT: CPT | Performed by: FAMILY MEDICINE

## 2020-06-09 PROCEDURE — 1036F TOBACCO NON-USER: CPT | Performed by: FAMILY MEDICINE

## 2020-06-09 PROCEDURE — 3288F FALL RISK ASSESSMENT DOCD: CPT | Performed by: FAMILY MEDICINE

## 2020-06-09 PROCEDURE — 3078F DIAST BP <80 MM HG: CPT | Performed by: FAMILY MEDICINE

## 2020-06-09 PROCEDURE — 3008F BODY MASS INDEX DOCD: CPT | Performed by: FAMILY MEDICINE

## 2020-06-09 PROCEDURE — 1170F FXNL STATUS ASSESSED: CPT | Performed by: FAMILY MEDICINE

## 2020-06-09 PROCEDURE — 3074F SYST BP LT 130 MM HG: CPT | Performed by: FAMILY MEDICINE

## 2020-06-09 PROCEDURE — G0439 PPPS, SUBSEQ VISIT: HCPCS | Performed by: FAMILY MEDICINE

## 2020-06-09 PROCEDURE — 1160F RVW MEDS BY RX/DR IN RCRD: CPT | Performed by: FAMILY MEDICINE

## 2020-06-09 PROCEDURE — 99215 OFFICE O/P EST HI 40 MIN: CPT | Performed by: FAMILY MEDICINE

## 2020-09-24 DIAGNOSIS — I10 ESSENTIAL (PRIMARY) HYPERTENSION: ICD-10-CM

## 2020-09-24 RX ORDER — ATENOLOL 25 MG/1
TABLET ORAL
Qty: 270 TABLET | Refills: 2 | Status: SHIPPED | OUTPATIENT
Start: 2020-09-24 | End: 2021-07-13

## 2020-10-15 ENCOUNTER — LAB (OUTPATIENT)
Dept: LAB | Age: 75
End: 2020-10-15
Payer: COMMERCIAL

## 2020-10-15 DIAGNOSIS — R39.89 ABNORMAL PROSTATE EXAM: ICD-10-CM

## 2020-10-15 DIAGNOSIS — N18.30 STAGE 3 CHRONIC KIDNEY DISEASE (HCC): ICD-10-CM

## 2020-10-15 DIAGNOSIS — R80.1 PERSISTENT PROTEINURIA: ICD-10-CM

## 2020-10-15 DIAGNOSIS — N25.81 SECONDARY HYPERPARATHYROIDISM OF RENAL ORIGIN (HCC): ICD-10-CM

## 2020-10-15 DIAGNOSIS — E78.00 PURE HYPERCHOLESTEROLEMIA: ICD-10-CM

## 2020-10-15 LAB
25(OH)D3 SERPL-MCNC: 26.4 NG/ML (ref 30–100)
ALBUMIN SERPL BCP-MCNC: 4.1 G/DL (ref 3.5–5)
ALP SERPL-CCNC: 60 U/L (ref 46–116)
ALT SERPL W P-5'-P-CCNC: 33 U/L (ref 12–78)
ANION GAP SERPL CALCULATED.3IONS-SCNC: 6 MMOL/L (ref 4–13)
AST SERPL W P-5'-P-CCNC: 19 U/L (ref 5–45)
BILIRUB SERPL-MCNC: 0.92 MG/DL (ref 0.2–1)
BUN SERPL-MCNC: 32 MG/DL (ref 5–25)
CALCIUM SERPL-MCNC: 9.2 MG/DL (ref 8.3–10.1)
CHLORIDE SERPL-SCNC: 109 MMOL/L (ref 100–108)
CHOLEST SERPL-MCNC: 143 MG/DL (ref 50–200)
CO2 SERPL-SCNC: 22 MMOL/L (ref 21–32)
CREAT SERPL-MCNC: 1.82 MG/DL (ref 0.6–1.3)
CREAT UR-MCNC: 116 MG/DL
ERYTHROCYTE [DISTWIDTH] IN BLOOD BY AUTOMATED COUNT: 12.3 % (ref 11.6–15.1)
GFR SERPL CREATININE-BSD FRML MDRD: 36 ML/MIN/1.73SQ M
GLUCOSE P FAST SERPL-MCNC: 80 MG/DL (ref 65–99)
HCT VFR BLD AUTO: 45.3 % (ref 36.5–49.3)
HDLC SERPL-MCNC: 74 MG/DL
HGB BLD-MCNC: 15.7 G/DL (ref 12–17)
LDLC SERPL CALC-MCNC: 55 MG/DL (ref 0–100)
MAGNESIUM SERPL-MCNC: 2.6 MG/DL (ref 1.6–2.6)
MCH RBC QN AUTO: 34.3 PG (ref 26.8–34.3)
MCHC RBC AUTO-ENTMCNC: 34.7 G/DL (ref 31.4–37.4)
MCV RBC AUTO: 99 FL (ref 82–98)
PHOSPHATE SERPL-MCNC: 4.4 MG/DL (ref 2.3–4.1)
PLATELET # BLD AUTO: 232 THOUSANDS/UL (ref 149–390)
PMV BLD AUTO: 10.1 FL (ref 8.9–12.7)
POTASSIUM SERPL-SCNC: 4.8 MMOL/L (ref 3.5–5.3)
PROT SERPL-MCNC: 7.4 G/DL (ref 6.4–8.2)
PROT UR-MCNC: 19 MG/DL
PROT/CREAT UR: 0.16 MG/G{CREAT} (ref 0–0.1)
PSA SERPL-MCNC: 1.6 NG/ML (ref 0–4)
PTH-INTACT SERPL-MCNC: 98.9 PG/ML (ref 18.4–80.1)
RBC # BLD AUTO: 4.58 MILLION/UL (ref 3.88–5.62)
SODIUM SERPL-SCNC: 137 MMOL/L (ref 136–145)
TRIGL SERPL-MCNC: 71 MG/DL
WBC # BLD AUTO: 4.44 THOUSAND/UL (ref 4.31–10.16)

## 2020-10-15 PROCEDURE — G0103 PSA SCREENING: HCPCS

## 2020-10-15 PROCEDURE — 84100 ASSAY OF PHOSPHORUS: CPT | Performed by: INTERNAL MEDICINE

## 2020-10-15 PROCEDURE — 82570 ASSAY OF URINE CREATININE: CPT | Performed by: INTERNAL MEDICINE

## 2020-10-15 PROCEDURE — 84156 ASSAY OF PROTEIN URINE: CPT | Performed by: INTERNAL MEDICINE

## 2020-10-15 PROCEDURE — 80053 COMPREHEN METABOLIC PANEL: CPT

## 2020-10-15 PROCEDURE — 80061 LIPID PANEL: CPT

## 2020-10-15 PROCEDURE — 83970 ASSAY OF PARATHORMONE: CPT | Performed by: INTERNAL MEDICINE

## 2020-10-15 PROCEDURE — 82306 VITAMIN D 25 HYDROXY: CPT | Performed by: INTERNAL MEDICINE

## 2020-10-15 PROCEDURE — 83735 ASSAY OF MAGNESIUM: CPT | Performed by: INTERNAL MEDICINE

## 2020-10-15 PROCEDURE — 36415 COLL VENOUS BLD VENIPUNCTURE: CPT | Performed by: INTERNAL MEDICINE

## 2020-10-15 PROCEDURE — 85027 COMPLETE CBC AUTOMATED: CPT | Performed by: INTERNAL MEDICINE

## 2020-10-16 ENCOUNTER — TELEPHONE (OUTPATIENT)
Dept: OTHER | Facility: HOSPITAL | Age: 75
End: 2020-10-16

## 2020-11-12 ENCOUNTER — OFFICE VISIT (OUTPATIENT)
Dept: CARDIOLOGY CLINIC | Facility: CLINIC | Age: 75
End: 2020-11-12
Payer: COMMERCIAL

## 2020-11-12 VITALS
BODY MASS INDEX: 27.56 KG/M2 | DIASTOLIC BLOOD PRESSURE: 86 MMHG | TEMPERATURE: 97.3 F | WEIGHT: 165.4 LBS | HEIGHT: 65 IN | SYSTOLIC BLOOD PRESSURE: 118 MMHG | HEART RATE: 82 BPM

## 2020-11-12 DIAGNOSIS — I35.9 AORTIC VALVE DISEASE: ICD-10-CM

## 2020-11-12 DIAGNOSIS — E78.00 PURE HYPERCHOLESTEROLEMIA: ICD-10-CM

## 2020-11-12 DIAGNOSIS — I10 ESSENTIAL (PRIMARY) HYPERTENSION: Primary | ICD-10-CM

## 2020-11-12 DIAGNOSIS — I48.91 ATRIAL FIBRILLATION, UNSPECIFIED TYPE (HCC): ICD-10-CM

## 2020-11-12 PROCEDURE — 3074F SYST BP LT 130 MM HG: CPT | Performed by: INTERNAL MEDICINE

## 2020-11-12 PROCEDURE — 1160F RVW MEDS BY RX/DR IN RCRD: CPT | Performed by: INTERNAL MEDICINE

## 2020-11-12 PROCEDURE — 99214 OFFICE O/P EST MOD 30 MIN: CPT | Performed by: INTERNAL MEDICINE

## 2020-11-12 PROCEDURE — 3008F BODY MASS INDEX DOCD: CPT | Performed by: INTERNAL MEDICINE

## 2020-11-12 PROCEDURE — 3079F DIAST BP 80-89 MM HG: CPT | Performed by: INTERNAL MEDICINE

## 2020-11-12 PROCEDURE — 93000 ELECTROCARDIOGRAM COMPLETE: CPT | Performed by: INTERNAL MEDICINE

## 2020-12-25 DIAGNOSIS — I48.91 ATRIAL FIBRILLATION, UNSPECIFIED TYPE (HCC): ICD-10-CM

## 2020-12-26 RX ORDER — ATENOLOL 100 MG/1
TABLET ORAL
Qty: 180 CAPSULE | Refills: 2 | Status: SHIPPED | OUTPATIENT
Start: 2020-12-26 | End: 2021-09-22

## 2021-01-19 DIAGNOSIS — E78.5 HYPERLIPIDEMIA, UNSPECIFIED HYPERLIPIDEMIA TYPE: ICD-10-CM

## 2021-01-19 RX ORDER — ATORVASTATIN CALCIUM 20 MG/1
TABLET, FILM COATED ORAL
Qty: 90 TABLET | Refills: 1 | Status: SHIPPED | OUTPATIENT
Start: 2021-01-19 | End: 2021-07-13

## 2021-03-01 DIAGNOSIS — I10 ESSENTIAL HYPERTENSION, MALIGNANT: ICD-10-CM

## 2021-03-01 RX ORDER — AMLODIPINE BESYLATE 10 MG/1
TABLET ORAL
Qty: 60 TABLET | Refills: 11 | Status: SHIPPED | OUTPATIENT
Start: 2021-03-01 | End: 2022-02-24

## 2021-04-01 DIAGNOSIS — I10 ESSENTIAL (PRIMARY) HYPERTENSION: ICD-10-CM

## 2021-04-01 RX ORDER — CLONIDINE HYDROCHLORIDE 0.1 MG/1
TABLET ORAL
Qty: 180 TABLET | Refills: 3 | Status: SHIPPED | OUTPATIENT
Start: 2021-04-01 | End: 2022-03-26

## 2021-05-12 ENCOUNTER — HOSPITAL ENCOUNTER (OUTPATIENT)
Dept: NON INVASIVE DIAGNOSTICS | Facility: CLINIC | Age: 76
Discharge: HOME | End: 2021-05-12
Payer: COMMERCIAL

## 2021-05-12 DIAGNOSIS — I48.91 ATRIAL FIBRILLATION, UNSPECIFIED TYPE (HCC): ICD-10-CM

## 2021-05-12 DIAGNOSIS — E78.00 PURE HYPERCHOLESTEROLEMIA: ICD-10-CM

## 2021-05-12 DIAGNOSIS — I10 ESSENTIAL (PRIMARY) HYPERTENSION: ICD-10-CM

## 2021-05-12 DIAGNOSIS — I35.9 AORTIC VALVE DISEASE: ICD-10-CM

## 2021-05-12 PROCEDURE — 93306 TTE W/DOPPLER COMPLETE: CPT

## 2021-05-12 PROCEDURE — 93306 TTE W/DOPPLER COMPLETE: CPT | Performed by: INTERNAL MEDICINE

## 2021-07-13 DIAGNOSIS — E78.5 HYPERLIPIDEMIA, UNSPECIFIED HYPERLIPIDEMIA TYPE: ICD-10-CM

## 2021-07-13 DIAGNOSIS — I10 ESSENTIAL (PRIMARY) HYPERTENSION: ICD-10-CM

## 2021-07-13 RX ORDER — ATENOLOL 25 MG/1
TABLET ORAL
Qty: 270 TABLET | Refills: 2 | Status: SHIPPED | OUTPATIENT
Start: 2021-07-13 | End: 2022-04-11

## 2021-07-13 RX ORDER — ATORVASTATIN CALCIUM 20 MG/1
TABLET, FILM COATED ORAL
Qty: 90 TABLET | Refills: 1 | Status: SHIPPED | OUTPATIENT
Start: 2021-07-13 | End: 2022-01-08

## 2021-09-22 DIAGNOSIS — I48.91 ATRIAL FIBRILLATION, UNSPECIFIED TYPE (HCC): ICD-10-CM

## 2021-09-22 RX ORDER — ATENOLOL 100 MG/1
TABLET ORAL
Qty: 180 CAPSULE | Refills: 2 | Status: SHIPPED | OUTPATIENT
Start: 2021-09-22 | End: 2022-06-20

## 2021-10-26 ENCOUNTER — OFFICE VISIT (OUTPATIENT)
Dept: FAMILY MEDICINE CLINIC | Facility: CLINIC | Age: 76
End: 2021-10-26
Payer: COMMERCIAL

## 2021-10-26 VITALS
WEIGHT: 163 LBS | SYSTOLIC BLOOD PRESSURE: 124 MMHG | TEMPERATURE: 96.7 F | OXYGEN SATURATION: 97 % | HEART RATE: 75 BPM | BODY MASS INDEX: 27.16 KG/M2 | DIASTOLIC BLOOD PRESSURE: 86 MMHG | HEIGHT: 65 IN

## 2021-10-26 DIAGNOSIS — R53.83 TIREDNESS: ICD-10-CM

## 2021-10-26 DIAGNOSIS — I12.9 BENIGN HYPERTENSION WITH CKD (CHRONIC KIDNEY DISEASE) STAGE III (HCC): ICD-10-CM

## 2021-10-26 DIAGNOSIS — Z00.00 MEDICARE ANNUAL WELLNESS VISIT, SUBSEQUENT: Primary | ICD-10-CM

## 2021-10-26 DIAGNOSIS — E55.9 VITAMIN D DEFICIENCY: ICD-10-CM

## 2021-10-26 DIAGNOSIS — R60.0 BILATERAL LEG EDEMA: ICD-10-CM

## 2021-10-26 DIAGNOSIS — Z12.5 PROSTATE CANCER SCREENING: ICD-10-CM

## 2021-10-26 DIAGNOSIS — N25.81 SECONDARY HYPERPARATHYROIDISM OF RENAL ORIGIN (HCC): ICD-10-CM

## 2021-10-26 DIAGNOSIS — N18.30 BENIGN HYPERTENSION WITH CKD (CHRONIC KIDNEY DISEASE) STAGE III (HCC): ICD-10-CM

## 2021-10-26 PROCEDURE — 3725F SCREEN DEPRESSION PERFORMED: CPT | Performed by: FAMILY MEDICINE

## 2021-10-26 PROCEDURE — 1170F FXNL STATUS ASSESSED: CPT | Performed by: FAMILY MEDICINE

## 2021-10-26 PROCEDURE — 3288F FALL RISK ASSESSMENT DOCD: CPT | Performed by: FAMILY MEDICINE

## 2021-10-26 PROCEDURE — 1125F AMNT PAIN NOTED PAIN PRSNT: CPT | Performed by: FAMILY MEDICINE

## 2021-10-26 PROCEDURE — 1101F PT FALLS ASSESS-DOCD LE1/YR: CPT | Performed by: FAMILY MEDICINE

## 2021-10-26 PROCEDURE — 1160F RVW MEDS BY RX/DR IN RCRD: CPT | Performed by: FAMILY MEDICINE

## 2021-10-26 PROCEDURE — 99214 OFFICE O/P EST MOD 30 MIN: CPT | Performed by: FAMILY MEDICINE

## 2021-10-26 PROCEDURE — 3008F BODY MASS INDEX DOCD: CPT | Performed by: FAMILY MEDICINE

## 2021-10-26 PROCEDURE — G0439 PPPS, SUBSEQ VISIT: HCPCS | Performed by: FAMILY MEDICINE

## 2021-11-15 ENCOUNTER — OFFICE VISIT (OUTPATIENT)
Dept: CARDIOLOGY CLINIC | Facility: CLINIC | Age: 76
End: 2021-11-15
Payer: COMMERCIAL

## 2021-11-15 VITALS
BODY MASS INDEX: 27.32 KG/M2 | SYSTOLIC BLOOD PRESSURE: 122 MMHG | WEIGHT: 164 LBS | HEIGHT: 65 IN | DIASTOLIC BLOOD PRESSURE: 84 MMHG | HEART RATE: 72 BPM

## 2021-11-15 DIAGNOSIS — E78.00 PURE HYPERCHOLESTEROLEMIA: Primary | ICD-10-CM

## 2021-11-15 DIAGNOSIS — I10 ESSENTIAL (PRIMARY) HYPERTENSION: ICD-10-CM

## 2021-11-15 DIAGNOSIS — I48.91 ATRIAL FIBRILLATION, UNSPECIFIED TYPE (HCC): ICD-10-CM

## 2021-11-15 DIAGNOSIS — I42.8 OTHER CARDIOMYOPATHY (HCC): ICD-10-CM

## 2021-11-15 DIAGNOSIS — I35.9 AORTIC VALVE DISEASE: ICD-10-CM

## 2021-11-15 PROCEDURE — 99214 OFFICE O/P EST MOD 30 MIN: CPT | Performed by: INTERNAL MEDICINE

## 2021-11-15 PROCEDURE — 3008F BODY MASS INDEX DOCD: CPT | Performed by: INTERNAL MEDICINE

## 2021-11-15 PROCEDURE — 1160F RVW MEDS BY RX/DR IN RCRD: CPT | Performed by: INTERNAL MEDICINE

## 2021-11-15 PROCEDURE — 93000 ELECTROCARDIOGRAM COMPLETE: CPT | Performed by: INTERNAL MEDICINE

## 2022-01-08 DIAGNOSIS — E78.5 HYPERLIPIDEMIA, UNSPECIFIED HYPERLIPIDEMIA TYPE: ICD-10-CM

## 2022-01-08 RX ORDER — ATORVASTATIN CALCIUM 20 MG/1
TABLET, FILM COATED ORAL
Qty: 90 TABLET | Refills: 1 | Status: SHIPPED | OUTPATIENT
Start: 2022-01-08 | End: 2022-07-08

## 2022-02-24 DIAGNOSIS — I10 ESSENTIAL HYPERTENSION, MALIGNANT: ICD-10-CM

## 2022-02-24 RX ORDER — AMLODIPINE BESYLATE 10 MG/1
TABLET ORAL
Qty: 60 TABLET | Refills: 11 | Status: SHIPPED | OUTPATIENT
Start: 2022-02-24

## 2022-03-26 DIAGNOSIS — I10 ESSENTIAL (PRIMARY) HYPERTENSION: ICD-10-CM

## 2022-03-26 RX ORDER — CLONIDINE HYDROCHLORIDE 0.1 MG/1
TABLET ORAL
Qty: 180 TABLET | Refills: 3 | Status: SHIPPED | OUTPATIENT
Start: 2022-03-26

## 2022-03-29 ENCOUNTER — OFFICE VISIT (OUTPATIENT)
Dept: FAMILY MEDICINE CLINIC | Facility: CLINIC | Age: 77
End: 2022-03-29
Payer: COMMERCIAL

## 2022-03-29 VITALS
OXYGEN SATURATION: 98 % | BODY MASS INDEX: 27.16 KG/M2 | TEMPERATURE: 99.2 F | HEART RATE: 78 BPM | RESPIRATION RATE: 16 BRPM | HEIGHT: 65 IN | DIASTOLIC BLOOD PRESSURE: 78 MMHG | WEIGHT: 163 LBS | SYSTOLIC BLOOD PRESSURE: 128 MMHG

## 2022-03-29 DIAGNOSIS — B02.9 HERPES ZOSTER WITHOUT COMPLICATION: Primary | ICD-10-CM

## 2022-03-29 PROCEDURE — 3008F BODY MASS INDEX DOCD: CPT | Performed by: FAMILY MEDICINE

## 2022-03-29 PROCEDURE — 99213 OFFICE O/P EST LOW 20 MIN: CPT | Performed by: FAMILY MEDICINE

## 2022-03-29 PROCEDURE — 3725F SCREEN DEPRESSION PERFORMED: CPT | Performed by: FAMILY MEDICINE

## 2022-03-29 PROCEDURE — 1160F RVW MEDS BY RX/DR IN RCRD: CPT | Performed by: FAMILY MEDICINE

## 2022-03-29 PROCEDURE — 1036F TOBACCO NON-USER: CPT | Performed by: FAMILY MEDICINE

## 2022-03-29 RX ORDER — VALACYCLOVIR HYDROCHLORIDE 1 G/1
1000 TABLET, FILM COATED ORAL 3 TIMES DAILY
Qty: 21 TABLET | Refills: 0 | Status: SHIPPED | OUTPATIENT
Start: 2022-03-29 | End: 2022-04-05

## 2022-03-29 NOTE — PROGRESS NOTES
Chief Complaint   Patient presents with    Rash     Right lower flank       Assessment/Plan:  Keep appt in 3 weeks  Discussed skin care  No problem-specific Assessment & Plan notes found for this encounter  Diagnoses and all orders for this visit:    Herpes zoster without complication  -     valACYclovir (VALTREX) 1,000 mg tablet; Take 1 tablet (1,000 mg total) by mouth 3 (three) times a day for 7 days          Subjective:      Patient ID: Penelope Deleon is a 78 y o  male  Pain started Sunday, rash started yesterday  Minor discomfort- 2 out of 10  The following portions of the patient's history were reviewed and updated as appropriate: allergies, current medications, past medical history, past social history, past surgical history and problem list     Review of Systems   Skin: Positive for rash           Objective:      /78 (BP Location: Left arm, Patient Position: Sitting, Cuff Size: Standard)   Pulse 78   Temp 99 2 °F (37 3 °C) (Tympanic)   Resp 16   Ht 5' 4 5" (1 638 m)   Wt 73 9 kg (163 lb)   SpO2 98%   BMI 27 55 kg/m²     Current Outpatient Medications:     amLODIPine (NORVASC) 10 mg tablet, take 1 tablet by mouth twice a day, Disp: 60 tablet, Rfl: 11    Ascorbic Acid (VITAMIN C) 500 MG/5ML LIQD, Take 1 tablet by mouth daily, Disp: , Rfl:     atenolol (TENORMIN) 25 mg tablet, take 2 tablets by mouth IN THE MORNING AND 1 TABLET IN THE EVENING, Disp: 270 tablet, Rfl: 2    atorvastatin (LIPITOR) 20 mg tablet, take 1 tablet by mouth once daily, Disp: 90 tablet, Rfl: 1    Cholecalciferol (VITAMIN D) 2000 units tablet, Take 2,000 Units by mouth daily, Disp: , Rfl:     cloNIDine (CATAPRES) 0 1 mg tablet, take 1 tablet by mouth every 12 hours, Disp: 180 tablet, Rfl: 3    Pradaxa 150 MG capsu, take 1 capsule by mouth twice a day, Disp: 180 capsule, Rfl: 2   Allergies   Allergen Reactions    Marijuana (Cannabis Sativa) Swelling          Physical Exam  Constitutional: Appearance: Normal appearance  Cardiovascular:      Rate and Rhythm: Normal rate and regular rhythm  Pulmonary:      Effort: Pulmonary effort is normal       Breath sounds: Normal breath sounds  Skin:     General: Skin is warm and dry  Findings: Rash present  Comments: Shingles early stages lumbar that travels anterior - no midline crossing  Neurological:      General: No focal deficit present  Mental Status: He is alert and oriented to person, place, and time  Psychiatric:         Mood and Affect: Mood normal          Behavior: Behavior normal          Thought Content:  Thought content normal          Judgment: Judgment normal

## 2022-04-11 DIAGNOSIS — I10 ESSENTIAL (PRIMARY) HYPERTENSION: ICD-10-CM

## 2022-04-11 RX ORDER — ATENOLOL 25 MG/1
TABLET ORAL
Qty: 270 TABLET | Refills: 2 | Status: SHIPPED | OUTPATIENT
Start: 2022-04-11

## 2022-04-19 ENCOUNTER — RA CDI HCC (OUTPATIENT)
Dept: OTHER | Facility: HOSPITAL | Age: 77
End: 2022-04-19

## 2022-04-19 ENCOUNTER — APPOINTMENT (OUTPATIENT)
Dept: LAB | Age: 77
End: 2022-04-19
Payer: COMMERCIAL

## 2022-04-19 DIAGNOSIS — R53.83 TIREDNESS: ICD-10-CM

## 2022-04-19 DIAGNOSIS — Z12.5 PROSTATE CANCER SCREENING: ICD-10-CM

## 2022-04-19 DIAGNOSIS — E55.9 VITAMIN D DEFICIENCY: ICD-10-CM

## 2022-04-19 DIAGNOSIS — N18.30 BENIGN HYPERTENSION WITH CKD (CHRONIC KIDNEY DISEASE) STAGE III (HCC): ICD-10-CM

## 2022-04-19 DIAGNOSIS — I12.9 BENIGN HYPERTENSION WITH CKD (CHRONIC KIDNEY DISEASE) STAGE III (HCC): ICD-10-CM

## 2022-04-19 LAB
25(OH)D3 SERPL-MCNC: 41.8 NG/ML (ref 30–100)
ALBUMIN SERPL BCP-MCNC: 4 G/DL (ref 3.5–5)
ALP SERPL-CCNC: 55 U/L (ref 46–116)
ALT SERPL W P-5'-P-CCNC: 29 U/L (ref 12–78)
ANION GAP SERPL CALCULATED.3IONS-SCNC: 5 MMOL/L (ref 4–13)
AST SERPL W P-5'-P-CCNC: 19 U/L (ref 5–45)
BILIRUB DIRECT SERPL-MCNC: 0.35 MG/DL (ref 0–0.2)
BILIRUB SERPL-MCNC: 1.29 MG/DL (ref 0.2–1)
BUN SERPL-MCNC: 29 MG/DL (ref 5–25)
CALCIUM SERPL-MCNC: 10 MG/DL (ref 8.3–10.1)
CHLORIDE SERPL-SCNC: 108 MMOL/L (ref 100–108)
CHOLEST SERPL-MCNC: 142 MG/DL
CO2 SERPL-SCNC: 24 MMOL/L (ref 21–32)
CREAT SERPL-MCNC: 1.89 MG/DL (ref 0.6–1.3)
ERYTHROCYTE [DISTWIDTH] IN BLOOD BY AUTOMATED COUNT: 13.3 % (ref 11.6–15.1)
GFR SERPL CREATININE-BSD FRML MDRD: 33 ML/MIN/1.73SQ M
GLUCOSE P FAST SERPL-MCNC: 106 MG/DL (ref 65–99)
HCT VFR BLD AUTO: 46.1 % (ref 36.5–49.3)
HDLC SERPL-MCNC: 76 MG/DL
HGB BLD-MCNC: 15.9 G/DL (ref 12–17)
LDLC SERPL CALC-MCNC: 53 MG/DL (ref 0–100)
MCH RBC QN AUTO: 34.6 PG (ref 26.8–34.3)
MCHC RBC AUTO-ENTMCNC: 34.5 G/DL (ref 31.4–37.4)
MCV RBC AUTO: 100 FL (ref 82–98)
NONHDLC SERPL-MCNC: 66 MG/DL
PLATELET # BLD AUTO: 249 THOUSANDS/UL (ref 149–390)
PMV BLD AUTO: 9.1 FL (ref 8.9–12.7)
POTASSIUM SERPL-SCNC: 5.3 MMOL/L (ref 3.5–5.3)
PROT SERPL-MCNC: 7.2 G/DL (ref 6.4–8.2)
PSA SERPL-MCNC: 1.9 NG/ML (ref 0–4)
RBC # BLD AUTO: 4.6 MILLION/UL (ref 3.88–5.62)
SODIUM SERPL-SCNC: 137 MMOL/L (ref 136–145)
T3 SERPL-MCNC: 1 NG/ML (ref 0.6–1.8)
T4 FREE SERPL-MCNC: 1.24 NG/DL (ref 0.76–1.46)
TRIGL SERPL-MCNC: 65 MG/DL
TSH SERPL DL<=0.05 MIU/L-ACNC: 1.51 UIU/ML (ref 0.45–4.5)
WBC # BLD AUTO: 4.31 THOUSAND/UL (ref 4.31–10.16)

## 2022-04-19 PROCEDURE — 80076 HEPATIC FUNCTION PANEL: CPT

## 2022-04-19 PROCEDURE — 36415 COLL VENOUS BLD VENIPUNCTURE: CPT

## 2022-04-19 PROCEDURE — 80048 BASIC METABOLIC PNL TOTAL CA: CPT

## 2022-04-19 PROCEDURE — 84439 ASSAY OF FREE THYROXINE: CPT

## 2022-04-19 PROCEDURE — 85027 COMPLETE CBC AUTOMATED: CPT

## 2022-04-19 PROCEDURE — 84480 ASSAY TRIIODOTHYRONINE (T3): CPT

## 2022-04-19 PROCEDURE — G0103 PSA SCREENING: HCPCS

## 2022-04-19 PROCEDURE — 84443 ASSAY THYROID STIM HORMONE: CPT

## 2022-04-19 PROCEDURE — 82306 VITAMIN D 25 HYDROXY: CPT

## 2022-04-19 PROCEDURE — 80061 LIPID PANEL: CPT

## 2022-04-19 NOTE — PROGRESS NOTES
[facility]  coding opportunities       Chart reviewed, no opportunity found:   [address]        Patients Insurance     Medicare Insurance: [employer]

## 2022-04-26 ENCOUNTER — OFFICE VISIT (OUTPATIENT)
Dept: FAMILY MEDICINE CLINIC | Facility: CLINIC | Age: 77
End: 2022-04-26
Payer: COMMERCIAL

## 2022-04-26 VITALS
HEIGHT: 65 IN | WEIGHT: 163.8 LBS | OXYGEN SATURATION: 96 % | RESPIRATION RATE: 16 BRPM | BODY MASS INDEX: 27.29 KG/M2 | TEMPERATURE: 98.2 F | HEART RATE: 100 BPM

## 2022-04-26 DIAGNOSIS — I48.91 ATRIAL FIBRILLATION, UNSPECIFIED TYPE (HCC): ICD-10-CM

## 2022-04-26 DIAGNOSIS — I42.8 OTHER CARDIOMYOPATHY (HCC): ICD-10-CM

## 2022-04-26 DIAGNOSIS — N18.30 BENIGN HYPERTENSION WITH CKD (CHRONIC KIDNEY DISEASE) STAGE III (HCC): ICD-10-CM

## 2022-04-26 DIAGNOSIS — R79.89 ELEVATED SERUM CREATININE: Primary | ICD-10-CM

## 2022-04-26 DIAGNOSIS — B02.9 HERPES ZOSTER WITHOUT COMPLICATION: ICD-10-CM

## 2022-04-26 DIAGNOSIS — R80.9 PROTEINURIA, UNSPECIFIED TYPE: ICD-10-CM

## 2022-04-26 DIAGNOSIS — I12.9 BENIGN HYPERTENSION WITH CKD (CHRONIC KIDNEY DISEASE) STAGE III (HCC): ICD-10-CM

## 2022-04-26 DIAGNOSIS — E21.3 HYPERPARATHYROIDISM (HCC): ICD-10-CM

## 2022-04-26 PROCEDURE — 3008F BODY MASS INDEX DOCD: CPT | Performed by: FAMILY MEDICINE

## 2022-04-26 PROCEDURE — 1036F TOBACCO NON-USER: CPT | Performed by: FAMILY MEDICINE

## 2022-04-26 PROCEDURE — 1101F PT FALLS ASSESS-DOCD LE1/YR: CPT | Performed by: FAMILY MEDICINE

## 2022-04-26 PROCEDURE — 3288F FALL RISK ASSESSMENT DOCD: CPT | Performed by: FAMILY MEDICINE

## 2022-04-26 PROCEDURE — 3725F SCREEN DEPRESSION PERFORMED: CPT | Performed by: FAMILY MEDICINE

## 2022-04-26 PROCEDURE — 1160F RVW MEDS BY RX/DR IN RCRD: CPT | Performed by: FAMILY MEDICINE

## 2022-04-26 PROCEDURE — 99214 OFFICE O/P EST MOD 30 MIN: CPT | Performed by: FAMILY MEDICINE

## 2022-04-26 NOTE — PROGRESS NOTES
Assessment/Plan: Need F/U with Nephrology  Reviewed past consults/labs with Lucila Mortimer  Discussed skin care  Chief Complaint   Patient presents with    Follow-up     6 montth follow up with labs 04 19 22; chroinc condition      PHQ-2/9 Depression Screening    Little interest or pleasure in doing things: 0 - not at all  Feeling down, depressed, or hopeless: 0 - not at all  PHQ-2 Score: 0  PHQ-2 Interpretation: Negative depression screen        Diagnosis ICD-10-CM Associated Orders   1  Elevated serum creatinine  R79 89 Ambulatory Referral to Nephrology   2  Proteinuria, unspecified type  R80 9 Ambulatory Referral to Nephrology     Microalbumin / creatinine urine ratio     Protein, urine, random     Microalbumin / creatinine urine ratio     Protein, urine, random   3  Hyperparathyroidism ([facility] )  E21 3 PTH, intact   4  Other cardiomyopathy ([facility] )  I42 8    5  Atrial fibrillation, unspecified type ([facility] )  I48 91    6  Benign hypertension with CKD (chronic kidney disease) stage III ([facility])  I12 9     N18 30          Subjective:      Patient ID: Marco Peralta is a 78 y o  male  HPI    The following portions of the patient's history were reviewed and updated as appropriate: allergies, current medications, past medical history, past social history, past surgical history and problem list   I have spent 25 minutes with Patient  today in which greater than 50% of this time was spent in counseling/coordination of care regarding Diagnostic results, Risks and benefits of tx options, Intructions for management, Patient and family education, Importance of tx compliance, Risk factor reductions and Impressions  Review of Systems   Constitutional: Negative  HENT: Negative  Eyes: Negative  Respiratory: Negative  Cardiovascular: Negative  Gastrointestinal: Negative  Genitourinary: Negative  Musculoskeletal: Negative  Skin: Negative  Neurological: Negative  Psychiatric/Behavioral: Negative  Objective:      Pulse 100   Temp 98 2 °F (36 8 °C) (Tympanic)   Resp 16   Ht 5' 4 5" (1 638 m)   Wt 74 3 kg (163 lb 12 8 oz)   SpO2 96%   BMI 27 68 kg/m²     Allergies   Allergen Reactions    Marijuana (Cannabis Sativa) Swelling     Allergies   Allergen Reactions    Marijuana (Cannabis Sativa) Swelling     History reviewed  No pertinent surgical history  Physical Exam  Constitutional:       Appearance: He is well-developed  HENT:      Head: Normocephalic and atraumatic  Right Ear: External ear normal       Left Ear: External ear normal       Nose: Nose normal    Eyes:      Conjunctiva/sclera: Conjunctivae normal       Pupils: Pupils are equal, round, and reactive to light  Cardiovascular:      Rate and Rhythm: Normal rate  Rhythm irregular  Heart sounds: Normal heart sounds  Pulmonary:      Effort: Pulmonary effort is normal       Breath sounds: Normal breath sounds  Abdominal:      General: Bowel sounds are normal       Palpations: Abdomen is soft  Musculoskeletal:      Cervical back: Normal range of motion and neck supple  Skin:     General: Skin is warm and dry  Comments: Shingles rash late stages  Neurological:      General: No focal deficit present  Mental Status: He is alert and oriented to person, place, and time  Deep Tendon Reflexes: Reflexes are normal and symmetric  Psychiatric:         Mood and Affect: Mood normal          Behavior: Behavior normal          Thought Content:  Thought content normal          Judgment: Judgment normal

## 2022-05-16 ENCOUNTER — HOSPITAL ENCOUNTER (OUTPATIENT)
Dept: NON INVASIVE DIAGNOSTICS | Facility: CLINIC | Age: 77
Discharge: HOME | End: 2022-05-16
Payer: COMMERCIAL

## 2022-05-16 VITALS
BODY MASS INDEX: 27.16 KG/M2 | DIASTOLIC BLOOD PRESSURE: 78 MMHG | HEART RATE: 75 BPM | WEIGHT: 163 LBS | HEIGHT: 65 IN | SYSTOLIC BLOOD PRESSURE: 128 MMHG

## 2022-05-16 DIAGNOSIS — I35.9 AORTIC VALVE DISEASE: ICD-10-CM

## 2022-05-16 DIAGNOSIS — I10 ESSENTIAL (PRIMARY) HYPERTENSION: ICD-10-CM

## 2022-05-16 DIAGNOSIS — E78.00 PURE HYPERCHOLESTEROLEMIA: ICD-10-CM

## 2022-05-16 DIAGNOSIS — I48.91 ATRIAL FIBRILLATION, UNSPECIFIED TYPE (HCC): ICD-10-CM

## 2022-05-16 LAB
AORTIC ROOT: 3.6 CM
AORTIC VALVE MEAN VELOCITY: 17.9 M/S
APICAL FOUR CHAMBER EJECTION FRACTION: 51 %
ASCENDING AORTA: 4.2 CM
AV AREA BY CONTINUOUS VTI: 0.8 CM2
AV AREA PEAK VELOCITY: 0.9 CM2
AV LVOT MEAN GRADIENT: 1 MMHG
AV LVOT PEAK GRADIENT: 1 MMHG
AV MEAN GRADIENT: 15 MMHG
AV PEAK GRADIENT: 24 MMHG
AV VALVE AREA: 1.04 CM2
AV VELOCITY RATIO: 0.2
DOP CALC AO PEAK VEL: 2.5 M/S
DOP CALC AO VTI: 48.04 CM
DOP CALC LVOT AREA: 6.15 CM2
DOP CALC LVOT DIAMETER: 2.8 CM
DOP CALC LVOT PEAK VEL VTI: 8.12 CM
DOP CALC LVOT PEAK VEL: 0.5 M/S
DOP CALC LVOT STROKE INDEX: 22.7 ML/M2
DOP CALC LVOT STROKE VOLUME: 49.97 CM3
E WAVE DECELERATION TIME: 142 MS
FRACTIONAL SHORTENING: 23 % (ref 28–44)
INTERVENTRICULAR SEPTUM IN DIASTOLE (PARASTERNAL SHORT AXIS VIEW): 1.7 CM
INTERVENTRICULAR SEPTUM: 1.7 CM (ref 0.6–1.1)
LAAS-AP2: 33.3 CM2
LAAS-AP4: 27.7 CM2
LEFT ATRIUM SIZE: 4.9 CM
LEFT INTERNAL DIMENSION IN SYSTOLE: 3.6 CM (ref 2.1–4)
LEFT VENTRICLE DIASTOLIC VOLUME (MOD BIPLANE): 80 ML
LEFT VENTRICLE SYSTOLIC VOLUME (MOD BIPLANE): 40 ML
LEFT VENTRICULAR INTERNAL DIMENSION IN DIASTOLE: 4.7 CM (ref 3.5–6)
LEFT VENTRICULAR POSTERIOR WALL IN END DIASTOLE: 1.6 CM
LEFT VENTRICULAR STROKE VOLUME: 51 ML
LV EF: 50 %
LVSV (TEICH): 51 ML
MV E'TISSUE VEL-SEP: 9 CM/S
MV PEAK A VEL: 0.43 M/S
MV PEAK E VEL: 68 CM/S
MV STENOSIS PRESSURE HALF TIME: 41 MS
MV VALVE AREA P 1/2 METHOD: 5.37 CM2
RA PRESSURE ESTIMATED: 3 MMHG
RIGHT ATRIUM AREA SYSTOLE A4C: 35.1 CM2
RIGHT VENTRICLE ID DIMENSION: 43 CM
RV PSP: 43 MMHG
SL CV LEFT ATRIUM LENGTH A2C: 6.7 CM
SL CV LV EF: 60
SL CV PED ECHO LEFT VENTRICLE DIASTOLIC VOLUME (MOD BIPLANE) 2D: 104 ML
SL CV PED ECHO LEFT VENTRICLE SYSTOLIC VOLUME (MOD BIPLANE) 2D: 53 ML
TR MAX PG: 40 MMHG
TR PEAK VELOCITY: 3.2 M/S
TRICUSPID VALVE PEAK REGURGITATION VELOCITY: 3.18 M/S

## 2022-05-16 PROCEDURE — 93306 TTE W/DOPPLER COMPLETE: CPT

## 2022-05-16 PROCEDURE — 93306 TTE W/DOPPLER COMPLETE: CPT | Performed by: INTERNAL MEDICINE

## 2022-06-20 DIAGNOSIS — I48.91 ATRIAL FIBRILLATION, UNSPECIFIED TYPE (HCC): ICD-10-CM

## 2022-06-20 RX ORDER — ATENOLOL 100 MG/1
TABLET ORAL
Qty: 180 CAPSULE | Refills: 2 | Status: SHIPPED | OUTPATIENT
Start: 2022-06-20

## 2022-07-08 DIAGNOSIS — E78.5 HYPERLIPIDEMIA, UNSPECIFIED HYPERLIPIDEMIA TYPE: ICD-10-CM

## 2022-07-08 RX ORDER — ATORVASTATIN CALCIUM 20 MG/1
TABLET, FILM COATED ORAL
Qty: 90 TABLET | Refills: 1 | Status: SHIPPED | OUTPATIENT
Start: 2022-07-08

## 2022-08-09 ENCOUNTER — VBI (OUTPATIENT)
Dept: ADMINISTRATIVE | Facility: OTHER | Age: 77
End: 2022-08-09

## 2022-08-29 ENCOUNTER — OFFICE VISIT (OUTPATIENT)
Dept: FAMILY MEDICINE CLINIC | Facility: CLINIC | Age: 77
End: 2022-08-29
Payer: COMMERCIAL

## 2022-08-29 VITALS
WEIGHT: 159 LBS | TEMPERATURE: 98 F | SYSTOLIC BLOOD PRESSURE: 110 MMHG | BODY MASS INDEX: 26.49 KG/M2 | OXYGEN SATURATION: 98 % | HEART RATE: 81 BPM | DIASTOLIC BLOOD PRESSURE: 72 MMHG | HEIGHT: 65 IN

## 2022-08-29 DIAGNOSIS — R60.0 BILATERAL LEG EDEMA: ICD-10-CM

## 2022-08-29 DIAGNOSIS — L03.115 CELLULITIS OF RIGHT LOWER EXTREMITY: Primary | ICD-10-CM

## 2022-08-29 DIAGNOSIS — Z23 NEED FOR TD VACCINE: ICD-10-CM

## 2022-08-29 DIAGNOSIS — N18.30 BENIGN HYPERTENSION WITH CKD (CHRONIC KIDNEY DISEASE) STAGE III (HCC): ICD-10-CM

## 2022-08-29 DIAGNOSIS — I12.9 BENIGN HYPERTENSION WITH CKD (CHRONIC KIDNEY DISEASE) STAGE III (HCC): ICD-10-CM

## 2022-08-29 DIAGNOSIS — I48.19 PERSISTENT ATRIAL FIBRILLATION (HCC): ICD-10-CM

## 2022-08-29 PROCEDURE — 1160F RVW MEDS BY RX/DR IN RCRD: CPT | Performed by: FAMILY MEDICINE

## 2022-08-29 PROCEDURE — 90471 IMMUNIZATION ADMIN: CPT | Performed by: FAMILY MEDICINE

## 2022-08-29 PROCEDURE — 90714 TD VACC NO PRESV 7 YRS+ IM: CPT | Performed by: FAMILY MEDICINE

## 2022-08-29 PROCEDURE — 99215 OFFICE O/P EST HI 40 MIN: CPT | Performed by: FAMILY MEDICINE

## 2022-08-29 RX ORDER — CEPHALEXIN 250 MG/1
250 CAPSULE ORAL EVERY 6 HOURS SCHEDULED
Qty: 40 CAPSULE | Refills: 0 | Status: SHIPPED | OUTPATIENT
Start: 2022-08-29 | End: 2022-09-09

## 2022-08-29 NOTE — PROGRESS NOTES
Chief Complaint   Patient presents with    Leg Swelling     Assessment/Plan: Start using the Compression socks  Today Silver cream, 4 by 4 's and ACE wrap  Td updated today  Reviewed skin care  Diagnoses and all orders for this visit:    Cellulitis of right lower extremity  -     silver sulfadiazine (SILVADENE,SSD) 1 % cream; Apply topically daily  -     cephalexin (KEFLEX) 250 mg capsule; Take 1 capsule (250 mg total) by mouth every 6 (six) hours for 10 days    Bilateral leg edema    Need for Td vaccine  -     TD VACCINE GREATER THAN OR EQUAL TO 8YO PRESERVATIVE FREE IM    Benign hypertension with CKD (chronic kidney disease) stage III (HCC)    Persistent atrial fibrillation (HCC)          Subjective:      Patient ID: Nicho Michaels is a 78 y o  male  [place] lesion  Was standing on a ladder and shin rubbed against wrong on ladder 9 days ago  Applying topical Antibiotic cream   Small leg wound weeping a clear fluid  Area with mild redness  Pt with history of getting a cellulitis of LE's and BLLE edema  Presently not using compression socks  Last Td ? The following portions of the patient's history were reviewed and updated as appropriate: allergies, current medications, past medical history, past social history, past surgical history and problem list   I have spent 40 minutes with Patient  today in which greater than 50% of this time was spent in counseling/coordination of care regarding Diagnostic results, Prognosis, Risks and benefits of tx options, Intructions for management, Patient and family education, Importance of tx compliance, Risk factor reductions and Impressions  Review of Systems   Constitutional: Negative  Skin:        4 mm open sleion right mid shin, mild surroung skin irritation  Psychiatric/Behavioral: Negative            Objective:      /72 (BP Location: Left arm, Patient Position: Sitting, Cuff Size: Standard)   Pulse 81   Temp 98 °F (36 7 °C) (Tympanic)  5' 4 5" (1 638 m)   Wt 72 1 kg (159 lb)   SpO2 98%   BMI 26 87 kg/m²     Current Outpatient Medications:     amLODIPine (NORVASC) 10 mg tablet, take 1 tablet by mouth twice a day, Disp: 60 tablet, Rfl: 11    Ascorbic Acid (VITAMIN C) 500 MG/5ML LIQD, Take 1 tablet by mouth daily, Disp: , Rfl:     atenolol (TENORMIN) 25 mg tablet, take 2 tablets by mouth IN THE MORNING AND 1 TABLET IN THE EVENING, Disp: 270 tablet, Rfl: 2    atorvastatin (LIPITOR) 20 mg tablet, take 1 tablet by mouth once daily, Disp: 90 tablet, Rfl: 1    cephalexin (KEFLEX) 250 mg capsule, Take 1 capsule (250 mg total) by mouth every 6 (six) hours for 10 days, Disp: 40 capsule, Rfl: 0    Cholecalciferol (VITAMIN D) 2000 units tablet, Take 2,000 Units by mouth daily, Disp: , Rfl:     cloNIDine (CATAPRES) 0 1 mg tablet, take 1 tablet by mouth every 12 hours, Disp: 180 tablet, Rfl: 3    Pradaxa 150 MG capsu, take 1 capsule by mouth twice a day, Disp: 180 capsule, Rfl: 2    silver sulfadiazine (SILVADENE,SSD) 1 % cream, Apply topically daily, Disp: 50 g, Rfl: 0    valACYclovir (VALTREX) 1,000 mg tablet, Take 1 tablet (1,000 mg total) by mouth 3 (three) times a day for 7 days (Patient not taking: Reported on 8/29/2022), Disp: 21 tablet, Rfl: 0  Allergies   Allergen Reactions    Marijuana (Cannabis Sativa) Swelling     History reviewed  No pertinent surgical history  Physical Exam  Constitutional:       Appearance: Normal appearance  HENT:      Head: Normocephalic and atraumatic  Musculoskeletal:      Right lower leg: Edema present  Left lower leg: Edema present  Comments: Chronic edema  Skin:     General: Skin is warm and dry  Findings: Rash present  Comments: Open lesion, about 3 - 4 mm, weeping clear DC  Mild surround irritation  Neurological:      Mental Status: He is alert  Psychiatric:         Mood and Affect: Mood normal          Behavior: Behavior normal          Thought Content:  Thought content normal          Judgment: Judgment normal

## 2022-09-06 ENCOUNTER — RA CDI HCC (OUTPATIENT)
Dept: OTHER | Facility: HOSPITAL | Age: 77
End: 2022-09-06

## 2022-09-06 PROBLEM — N18.4 STAGE 4 CHRONIC KIDNEY DISEASE (HCC): Status: ACTIVE | Noted: 2018-07-03

## 2022-09-06 NOTE — PROGRESS NOTES
[facility]  coding opportunities       Chart reviewed, no opportunity found:   [address]        Patients Insurance     Medicare Insurance: [employer]

## 2022-09-09 ENCOUNTER — OFFICE VISIT (OUTPATIENT)
Dept: FAMILY MEDICINE CLINIC | Facility: CLINIC | Age: 77
End: 2022-09-09
Payer: COMMERCIAL

## 2022-09-09 VITALS
HEART RATE: 113 BPM | BODY MASS INDEX: 26.33 KG/M2 | HEIGHT: 65 IN | WEIGHT: 158 LBS | SYSTOLIC BLOOD PRESSURE: 124 MMHG | TEMPERATURE: 97.8 F | OXYGEN SATURATION: 98 % | DIASTOLIC BLOOD PRESSURE: 72 MMHG

## 2022-09-09 DIAGNOSIS — L98.9 SKIN LESION OF RIGHT LEG: Primary | ICD-10-CM

## 2022-09-09 PROCEDURE — 99213 OFFICE O/P EST LOW 20 MIN: CPT | Performed by: FAMILY MEDICINE

## 2022-09-09 NOTE — PROGRESS NOTES
Chief Complaint   Patient presents with    Follow-up     Assessment/Plan:  Recommend compression socks, may need to change dressing 3 - 4 X a day  Can recheck in couple weeks  Discussed signs ogf infection  Diagnoses and all orders for this visit:    Skin lesion of right leg          Subjective:      Patient ID: Marcelle Elliott is a 78 y o  male  Wound check  Not using compression socks  Clear fluid draining  No redness or increased temperature  Leaking fluid decreased volume  The following portions of the patient's history were reviewed and updated as appropriate: allergies, current medications, past medical history, past social history, past surgical history and problem list     Review of Systems   Skin: Positive for wound           Objective:      /72 (BP Location: Left arm, Patient Position: Sitting, Cuff Size: Standard)   Pulse (!) 113   Temp 97 8 °F (36 6 °C) (Tympanic)   Ht 5' 4 5" (1 638 m)   Wt 71 7 kg (158 lb)   SpO2 98%   BMI 26 70 kg/m²     Current Outpatient Medications:     amLODIPine (NORVASC) 10 mg tablet, take 1 tablet by mouth twice a day, Disp: 60 tablet, Rfl: 11    Ascorbic Acid (VITAMIN C) 500 MG/5ML LIQD, Take 1 tablet by mouth daily, Disp: , Rfl:     atenolol (TENORMIN) 25 mg tablet, take 2 tablets by mouth IN THE MORNING AND 1 TABLET IN THE EVENING, Disp: 270 tablet, Rfl: 2    atorvastatin (LIPITOR) 20 mg tablet, take 1 tablet by mouth once daily, Disp: 90 tablet, Rfl: 1    cephalexin (KEFLEX) 250 mg capsule, Take 1 capsule (250 mg total) by mouth every 6 (six) hours for 10 days, Disp: 40 capsule, Rfl: 0    Cholecalciferol (VITAMIN D) 2000 units tablet, Take 2,000 Units by mouth daily, Disp: , Rfl:     cloNIDine (CATAPRES) 0 1 mg tablet, take 1 tablet by mouth every 12 hours, Disp: 180 tablet, Rfl: 3    Pradaxa 150 MG capsu, take 1 capsule by mouth twice a day, Disp: 180 capsule, Rfl: 2    silver sulfadiazine (SILVADENE,SSD) 1 % cream, Apply topically daily, Disp: 50 g, Rfl: 0    valACYclovir (VALTREX) 1,000 mg tablet, Take 1 tablet (1,000 mg total) by mouth 3 (three) times a day for 7 days (Patient not taking: Reported on 8/29/2022), Disp: 21 tablet, Rfl: 0  Allergies   Allergen Reactions    Marijuana (Cannabis Sativa) Swelling     History reviewed  No pertinent surgical history  Physical Exam  Constitutional:       Appearance: Normal appearance  Skin:     Comments: Edema BLLE's - chronic  No infection present  Neurological:      Mental Status: He is alert  Psychiatric:         Mood and Affect: Mood normal          Behavior: Behavior normal          Thought Content:  Thought content normal          Judgment: Judgment normal

## 2022-09-19 ENCOUNTER — OFFICE VISIT (OUTPATIENT)
Dept: WOUND CARE | Facility: HOSPITAL | Age: 77
End: 2022-09-19
Payer: COMMERCIAL

## 2022-09-19 VITALS
HEART RATE: 72 BPM | RESPIRATION RATE: 18 BRPM | HEIGHT: 64 IN | WEIGHT: 155 LBS | SYSTOLIC BLOOD PRESSURE: 112 MMHG | TEMPERATURE: 97.8 F | BODY MASS INDEX: 26.46 KG/M2 | DIASTOLIC BLOOD PRESSURE: 64 MMHG

## 2022-09-19 DIAGNOSIS — L97.919 CHRONIC VENOUS HYPERTENSION (IDIOPATHIC) WITH ULCER OF RIGHT LOWER EXTREMITY (HCC): Primary | ICD-10-CM

## 2022-09-19 DIAGNOSIS — I87.311 CHRONIC VENOUS HYPERTENSION (IDIOPATHIC) WITH ULCER OF RIGHT LOWER EXTREMITY (HCC): Primary | ICD-10-CM

## 2022-09-19 PROCEDURE — 11042 DBRDMT SUBQ TIS 1ST 20SQCM/<: CPT | Performed by: FAMILY MEDICINE

## 2022-09-19 PROCEDURE — 99213 OFFICE O/P EST LOW 20 MIN: CPT | Performed by: FAMILY MEDICINE

## 2022-09-19 PROCEDURE — 99203 OFFICE O/P NEW LOW 30 MIN: CPT | Performed by: FAMILY MEDICINE

## 2022-09-19 RX ORDER — LIDOCAINE HYDROCHLORIDE 40 MG/ML
5 SOLUTION TOPICAL ONCE
Status: COMPLETED | OUTPATIENT
Start: 2022-09-19 | End: 2022-09-19

## 2022-09-19 RX ADMIN — LIDOCAINE HYDROCHLORIDE 5 ML: 40 SOLUTION TOPICAL at 10:30

## 2022-09-19 NOTE — PATIENT INSTRUCTIONS
Orders Placed This Encounter   Procedures    Wound cleansing and dressings     Wound care orders    Wash your hands with soap and water  Remove old dressing, discard into plastic bag and place in trash  Cleanse the wound with mild soap and waterprior to applying a clean dressing  Do not use tissue or cotton balls  Do not scrub the wound  Pat dry using gauze  Shower YES  Apply MAXORB to the RIGHT LOWER LEG wound  Cover with ABD  Secure with [facility] WEST AND TAPE  Change dressing EVERY DAY    Compression Stocking Spandagrip F    Remove compression stockings every night HS and re-apply first thing qAM  Follow daily skin care as instructed  Avoid prolonged standing in one place  Elevate leg(s) above the level of the heart when sitting or as much as possible         Done today   Return in 1 week     Standing Status:   Future     Standing Expiration Date:   9/19/2023    Debridement     This order was created via procedure documentation

## 2022-09-19 NOTE — PROGRESS NOTES
Patient ID: Kim Huber is a 78 y o  male Date of Birth 1945     Chief Complaint  Chief Complaint   Patient presents with    New Patient Visit     Wound care       Allergies  Marijuana (cannabis sativa)    Assessment:    No problem-specific Assessment & Plan notes found for this encounter  Diagnoses and all orders for this visit:    Chronic venous hypertension (idiopathic) with ulcer of right lower extremity (HCC)  -     lidocaine (XYLOCAINE) 4 % topical solution 5 mL  -     Wound cleansing and dressings; Future  -     Debridement              Debridement   Wound 09/19/22 Venous Ulcer Pretibial Right    Universal Protocol:  Consent: Verbal consent obtained  Consent given by: patient  Time out: Immediately prior to procedure a "time out" was called to verify the correct patient, procedure, equipment, support staff and site/side marked as required  Timeout called at: 9/19/2022 9:40 AM   Patient understanding: patient states understanding of the procedure being performed  Patient identity confirmed: verbally with patient      Performed by: physician  Debridement type: surgical  Level of debridement: subcutaneous tissue  Pain control: lidocaine 4%  Post-debridement measurements  Length (cm): 0 2  Width (cm): 0 2  Depth (cm): 0 3  Percent debrided: 100%  Surface Area (cm^2): 0 04  Area debrided (cm^2): 0 04  Volume (cm^3): 0 01  Tissue and other material debrided: subcutaneous tissue  Devitalized tissue debrided: exudate and slough  Instrument(s) utilized: curette  Bleeding: small  Hemostasis obtained with: pressure  Procedural pain (0-10): 0  Post-procedural pain: 0   Response to treatment: procedure was tolerated well          Plan:    Initial evaluation  Debrided as above  Wound management with alginate, changing daily  See wound orders below  Compression with Tubigrip  Consider increasing compression if necessary    ABIs obtained today, right 1 09, left 1 18  Keep legs elevated whenever seated and avoid prolonged standing  Do not submerge in any body water such as bath tub, hot tub, swimming pool, etc  Never leave wound open to air  No harsh cleanser such as alcohol, peroxide, or antibacterial soap  Followup in 1 week or call sooner with questions or concerns    Wound 09/19/22 Venous Ulcer Pretibial Right (Active)   Wound Image Images linked 09/19/22 1011   Wound Description White;Yellow;Pink 09/19/22 0925   Kiki-wound Assessment Edema 09/19/22 0925   Wound Length (cm) 0 2 cm 09/19/22 0925   Wound Width (cm) 0 2 cm 09/19/22 0925   Wound Depth (cm) 0 2 cm 09/19/22 0925   Wound Surface Area (cm^2) 0 04 cm^2 09/19/22 0925   Wound Volume (cm^3) 0 008 cm^3 09/19/22 0925   Calculated Wound Volume (cm^3) 0 01 cm^3 09/19/22 0925   Undermining 0 2 09/19/22 0925   Undermining is depth extending from 12-12 09/19/22 0925   Drainage Amount Large 09/19/22 0925   Drainage Description Other (Comment) (clear) 09/19/22 0925   Non-staged Wound Description Full thickness 09/19/22 0925   Treatments Cleansed 09/19/22 0925   Wound packed? No 09/19/22 0925   Dressing Changed Changed 09/19/22 0925   Patient Tolerance Tolerated well 09/19/22 0925   Dressing Status Intact 09/19/22 0925       Wound 09/19/22 Venous Ulcer Pretibial Right (Active)   Date First Assessed/Time First Assessed: 09/19/22 0925   Pre-Existing Wound: No  Primary Wound Type: Venous Ulcer  Location: Pretibial  Wound Location Orientation: Right       Subjective:        Patient presents for initial evaluation of RLE wound that has been present for about the past month  He is unclear how it started but per PCP documentation, patient hit shin on ladder  Has been using silvadene on the wound  Does not wear compression  No DM  No smoking  The following portions of the patient's history were reviewed and updated as appropriate:   He  has a past medical history of Atrial fibrillation ([address] ) and Hypertension    He   Patient Active Problem List    Diagnosis Date Noted    Other cardiomyopathy ([place] [address] ) 11/15/2021    Secondary hyperparathyroidism of renal origin ([place] [address] ) 06/09/2020    Persistent atrial fibrillation ([place] [address] ) 12/03/2018    Vitamin D deficiency 07/03/2018    Low bicarbonate 07/03/2018    Stage 4 chronic kidney disease ([place] [address] ) 07/03/2018    Benign hypertension with CKD (chronic kidney disease) stage III ([place] [address] ) 07/03/2018    Bilateral leg edema 07/03/2018     He  has no past surgical history on file  He  reports that he has never smoked  He has never used smokeless tobacco  He reports current alcohol use of about 4 0 standard drinks of alcohol per week  He reports that he does not use drugs  Current Outpatient Medications   Medication Sig Dispense Refill    amLODIPine (NORVASC) 10 mg tablet take 1 tablet by mouth twice a day 60 tablet 11    Ascorbic Acid (VITAMIN C) 500 MG/5ML LIQD Take 1 tablet by mouth daily      atenolol (TENORMIN) 25 mg tablet take 2 tablets by mouth IN THE MORNING AND 1 TABLET IN THE EVENING 270 tablet 2    atorvastatin (LIPITOR) 20 mg tablet take 1 tablet by mouth once daily 90 tablet 1    Cholecalciferol (VITAMIN D) 2000 units tablet Take 2,000 Units by mouth daily      cloNIDine (CATAPRES) 0 1 mg tablet take 1 tablet by mouth every 12 hours 180 tablet 3    Pradaxa 150 MG capsu take 1 capsule by mouth twice a day 180 capsule 2    silver sulfadiazine (SILVADENE,SSD) 1 % cream Apply topically daily 50 g 0    valACYclovir (VALTREX) 1,000 mg tablet Take 1 tablet (1,000 mg total) by mouth 3 (three) times a day for 7 days (Patient not taking: Reported on 8/29/2022) 21 tablet 0     No current facility-administered medications for this visit  He is allergic to marijuana (cannabis sativa)       Review of Systems   Constitutional: Negative for chills and fever  HENT: Negative for congestion and sneezing  Respiratory: Negative for cough  Cardiovascular: Positive for leg swelling  Skin: Positive for wound     Psychiatric/Behavioral: Negative for agitation  Objective:       Wound 09/19/22 Venous Ulcer Pretibial Right (Active)   Wound Image Images linked 09/19/22 1011   Wound Description White;Yellow;Pink 09/19/22 0925   Kiki-wound Assessment Edema 09/19/22 0925   Wound Length (cm) 0 2 cm 09/19/22 0925   Wound Width (cm) 0 2 cm 09/19/22 0925   Wound Depth (cm) 0 2 cm 09/19/22 0925   Wound Surface Area (cm^2) 0 04 cm^2 09/19/22 0925   Wound Volume (cm^3) 0 008 cm^3 09/19/22 0925   Calculated Wound Volume (cm^3) 0 01 cm^3 09/19/22 0925   Undermining 0 2 09/19/22 0925   Undermining is depth extending from 12-12 09/19/22 0925   Drainage Amount Large 09/19/22 0925   Drainage Description Other (Comment) (clear) 09/19/22 0925   Non-staged Wound Description Full thickness 09/19/22 0925   Treatments Cleansed 09/19/22 0925   Wound packed? No 09/19/22 0925   Dressing Changed Changed 09/19/22 0925   Patient Tolerance Tolerated well 09/19/22 0925   Dressing Status Intact 09/19/22 0925       /64   Pulse 72   Temp 97 8 °F (36 6 °C)   Resp 18   Ht 5' 4" (1 626 m)   Wt 70 3 kg (155 lb)   BMI 26 61 kg/m²     Physical Exam  Constitutional:       General: He is not in acute distress  Appearance: Normal appearance  He is not ill-appearing, toxic-appearing or diaphoretic  HENT:      Head: Normocephalic and atraumatic  Right Ear: External ear normal       Left Ear: External ear normal    Eyes:      Conjunctiva/sclera: Conjunctivae normal    Pulmonary:      Effort: Pulmonary effort is normal  No respiratory distress  Musculoskeletal:      Cervical back: Neck supple  Right lower leg: Edema present  Left lower leg: Edema present  Skin:     Comments: See wound assessment   Neurological:      Mental Status: He is alert     Psychiatric:         Mood and Affect: Mood normal          Behavior: Behavior normal            Wound 09/19/22 Venous Ulcer Pretibial Right (Active)   Wound Image   09/19/22 1011   Wound Description White;Yellow;Pink 09/19/22 0925   Kiki-wound Assessment Edema 09/19/22 0925   Wound Length (cm) 0 2 cm 09/19/22 0925   Wound Width (cm) 0 2 cm 09/19/22 0925   Wound Depth (cm) 0 2 cm 09/19/22 0925   Wound Surface Area (cm^2) 0 04 cm^2 09/19/22 0925   Wound Volume (cm^3) 0 008 cm^3 09/19/22 0925   Calculated Wound Volume (cm^3) 0 01 cm^3 09/19/22 0925   Undermining 0 2 09/19/22 0925   Undermining is depth extending from 12-3 09/19/22 0925   Drainage Amount Large 09/19/22 0925   Drainage Description Other (Comment) 09/19/22 0925   Non-staged Wound Description Full thickness 09/19/22 0925   Treatments Cleansed 09/19/22 0925   Wound packed? No 09/19/22 0925   Dressing Changed Changed 09/19/22 0925   Patient Tolerance Tolerated well 09/19/22 0925   Dressing Status Intact 09/19/22 0925                         Wound Instructions:  Orders Placed This Encounter   Procedures    Wound cleansing and dressings     Wound care orders    Wash your hands with soap and water  Remove old dressing, discard into plastic bag and place in trash  Cleanse the wound with mild soap and waterprior to applying a clean dressing  Do not use tissue or cotton balls  Do not scrub the wound  Pat dry using gauze  Shower YES  Apply MAXORB to the RIGHT LOWER LEG wound  Cover with ABD  Secure with [facility] [employer]  Change dressing EVERY DAY    Compression Stocking Spandagrip F    Remove compression stockings every night HS and re-apply first thing qAM  Follow daily skin care as instructed  Avoid prolonged standing in one place  Elevate leg(s) above the level of the heart when sitting or as much as possible  Done today   Return in 1 week     Standing Status:   Future     Standing Expiration Date:   9/19/2023    Debridement     This order was created via procedure documentation        Diagnosis ICD-10-CM Associated Orders   1   Chronic venous hypertension (idiopathic) with ulcer of right lower extremity (HCC)  I87 311 lidocaine (XYLOCAINE) 4 % topical solution 5 mL    L97 919 Wound cleansing and dressings     Debridement

## 2022-09-26 ENCOUNTER — OFFICE VISIT (OUTPATIENT)
Dept: WOUND CARE | Facility: HOSPITAL | Age: 77
End: 2022-09-26
Payer: COMMERCIAL

## 2022-09-26 VITALS
DIASTOLIC BLOOD PRESSURE: 80 MMHG | RESPIRATION RATE: 16 BRPM | SYSTOLIC BLOOD PRESSURE: 130 MMHG | HEART RATE: 72 BPM | TEMPERATURE: 99.2 F

## 2022-09-26 DIAGNOSIS — I87.311 CHRONIC VENOUS HYPERTENSION (IDIOPATHIC) WITH ULCER OF RIGHT LOWER EXTREMITY (HCC): Primary | ICD-10-CM

## 2022-09-26 DIAGNOSIS — L97.919 CHRONIC VENOUS HYPERTENSION (IDIOPATHIC) WITH ULCER OF RIGHT LOWER EXTREMITY (HCC): Primary | ICD-10-CM

## 2022-09-26 DIAGNOSIS — L97.911 NON-PRESSURE CHRONIC ULCER OF RIGHT LOWER LEG, LIMITED TO BREAKDOWN OF SKIN (HCC): ICD-10-CM

## 2022-09-26 PROCEDURE — 99213 OFFICE O/P EST LOW 20 MIN: CPT | Performed by: FAMILY MEDICINE

## 2022-09-26 PROCEDURE — 99212 OFFICE O/P EST SF 10 MIN: CPT | Performed by: FAMILY MEDICINE

## 2022-09-26 NOTE — PROGRESS NOTES
Patient ID: Nicho Michaels is a 78 y o  male Date of Birth 1945     Chief Complaint  Chief Complaint   Patient presents with    Follow Up Wound Care Visit     Right lower leg wound       Allergies  Marijuana (cannabis sativa)    Assessment:    No problem-specific Assessment & Plan notes found for this encounter  Diagnoses and all orders for this visit:    Chronic venous hypertension (idiopathic) with ulcer of right lower extremity (HCC)  -     Wound cleansing and dressings; Future  -     Wound compression and edema control;  Future    Non-pressure chronic ulcer of right lower leg, limited to breakdown of skin ([facility] [address] [address] )              Procedures    Plan:   Wound is improved, almost closed  Wound management with foam, see wound orders below  Increase compression to double layer of tubigrips  Keep legs elevated whenever seated and avoid prolonged standing  Followup in 1 week or call sooner with questions or concerns    Wound 09/19/22 Venous Ulcer Pretibial Right (Active)   Wound Image Images linked 09/26/22 0925   Wound Description Epithelialization 09/26/22 0925   Kiki-wound Assessment Edema 09/26/22 0925   Wound Length (cm) 0 1 cm 09/26/22 0925   Wound Width (cm) 0 1 cm 09/26/22 0925   Wound Depth (cm) 0 1 cm 09/26/22 0925   Wound Surface Area (cm^2) 0 01 cm^2 09/26/22 0925   Wound Volume (cm^3) 0 001 cm^3 09/26/22 0925   Calculated Wound Volume (cm^3) 0 cm^3 09/26/22 0925   Change in Wound Size % 100 09/26/22 0925   Drainage Amount Scant 09/26/22 0925   Drainage Description Serous 09/26/22 0925   Non-staged Wound Description Full thickness 09/26/22 0925   Dressing Status Intact 09/26/22 0925       Wound 09/19/22 Venous Ulcer Pretibial Right (Active)   Date First Assessed/Time First Assessed: 09/19/22 0925   Pre-Existing Wound: No  Primary Wound Type: Venous Ulcer  Location: Pretibial  Wound Location Orientation: Right       Subjective:           9/19/22: Patient presents for initial evaluation of RLE wound that has been present for about the past month  He is unclear how it started but per PCP documentation, patient hit shin on ladder  Has been using silvadene on the wound  Does not wear compression  No DM  No smoking       9/26:  Patient presents for followup of right lower extremity venous ulcer  No increased pain or drainage  Has been using alginate on the wound and Tubigrip for compression  The following portions of the patient's history were reviewed and updated as appropriate:   He  has a past medical history of Atrial fibrillation ([place] [address] ) and Hypertension  He   Patient Active Problem List    Diagnosis Date Noted    Other cardiomyopathy ([place] [address] ) 11/15/2021    Secondary hyperparathyroidism of renal origin ([place] [address] ) 06/09/2020    Persistent atrial fibrillation ([place] [address] ) 12/03/2018    Vitamin D deficiency 07/03/2018    Low bicarbonate 07/03/2018    Stage 4 chronic kidney disease ([place] [address] ) 07/03/2018    Benign hypertension with CKD (chronic kidney disease) stage III ([place] [address] ) 07/03/2018    Bilateral leg edema 07/03/2018     He  reports that he has never smoked  He has never used smokeless tobacco  He reports current alcohol use of about 4 0 standard drinks of alcohol per week  He reports that he does not use drugs    Current Outpatient Medications   Medication Sig Dispense Refill    amLODIPine (NORVASC) 10 mg tablet take 1 tablet by mouth twice a day 60 tablet 11    Ascorbic Acid (VITAMIN C) 500 MG/5ML LIQD Take 1 tablet by mouth daily      atenolol (TENORMIN) 25 mg tablet take 2 tablets by mouth IN THE MORNING AND 1 TABLET IN THE EVENING 270 tablet 2    atorvastatin (LIPITOR) 20 mg tablet take 1 tablet by mouth once daily 90 tablet 1    Cholecalciferol (VITAMIN D) 2000 units tablet Take 2,000 Units by mouth daily      cloNIDine (CATAPRES) 0 1 mg tablet take 1 tablet by mouth every 12 hours 180 tablet 3    Pradaxa 150 MG capsu take 1 capsule by mouth twice a day 180 capsule 2    silver sulfadiazine (SILVADENE,SSD) 1 % cream Apply topically daily 50 g 0    valACYclovir (VALTREX) 1,000 mg tablet Take 1 tablet (1,000 mg total) by mouth 3 (three) times a day for 7 days (Patient not taking: Reported on 8/29/2022) 21 tablet 0     No current facility-administered medications for this visit  He is allergic to marijuana (cannabis sativa)       Review of Systems   Constitutional: Negative for chills and fever  HENT: Negative for congestion and sneezing  Respiratory: Negative for cough  Cardiovascular: Positive for leg swelling  Skin: Positive for wound  Psychiatric/Behavioral: Negative for agitation  Objective:       Wound 09/19/22 Venous Ulcer Pretibial Right (Active)   Wound Image Images linked 09/26/22 0925   Wound Description Epithelialization 09/26/22 0925   Kiki-wound Assessment Edema 09/26/22 0925   Wound Length (cm) 0 1 cm 09/26/22 0925   Wound Width (cm) 0 1 cm 09/26/22 0925   Wound Depth (cm) 0 1 cm 09/26/22 0925   Wound Surface Area (cm^2) 0 01 cm^2 09/26/22 0925   Wound Volume (cm^3) 0 001 cm^3 09/26/22 0925   Calculated Wound Volume (cm^3) 0 cm^3 09/26/22 0925   Change in Wound Size % 100 09/26/22 0925   Drainage Amount Scant 09/26/22 0925   Drainage Description Serous 09/26/22 0925   Non-staged Wound Description Full thickness 09/26/22 0925   Dressing Status Intact 09/26/22 0925       /80   Pulse 72   Temp 99 2 °F (37 3 °C)   Resp 16     Physical Exam  Vitals reviewed  Constitutional:       General: He is not in acute distress  Appearance: Normal appearance  He is not ill-appearing, toxic-appearing or diaphoretic  HENT:      Head: Normocephalic and atraumatic  Right Ear: External ear normal       Left Ear: External ear normal    Eyes:      Conjunctiva/sclera: Conjunctivae normal    Pulmonary:      Effort: Pulmonary effort is normal  No respiratory distress  Musculoskeletal:      Cervical back: Neck supple  Right lower leg: Edema present        Left lower leg: Edema present  Skin:     Comments: See wound assessment   Neurological:      Mental Status: He is alert  Psychiatric:         Mood and Affect: Mood normal          Behavior: Behavior normal            Wound 09/19/22 Venous Ulcer Pretibial Right (Active)   Wound Image   09/26/22 0925   Wound Description Epithelialization 09/26/22 0925   Kiki-wound Assessment Edema 09/26/22 0925   Wound Length (cm) 0 1 cm 09/26/22 0925   Wound Width (cm) 0 1 cm 09/26/22 0925   Wound Depth (cm) 0 1 cm 09/26/22 0925   Wound Surface Area (cm^2) 0 01 cm^2 09/26/22 0925   Wound Volume (cm^3) 0 001 cm^3 09/26/22 0925   Calculated Wound Volume (cm^3) 0 cm^3 09/26/22 0925   Change in Wound Size % 100 09/26/22 0925   Undermining 0 2 09/19/22 0925   Undermining is depth extending from 12-12 09/19/22 0925   Drainage Amount Scant 09/26/22 0925   Drainage Description Serous 09/26/22 0925   Non-staged Wound Description Full thickness 09/26/22 0925   Treatments Cleansed 09/19/22 0925   Wound packed? No 09/19/22 0925   Dressing Changed Changed 09/19/22 0925   Patient Tolerance Tolerated well 09/19/22 0925   Dressing Status Intact 09/26/22 0925                         Wound Instructions:  Orders Placed This Encounter   Procedures    Wound cleansing and dressings     Right lower leg wound    Wash your hands with soap and water  Remove old dressing, discard into plastic bag and place in trash  Cleanse the wound with mild soap and waterprior to applying a clean dressing  Do not use tissue or cotton balls  Do not scrub the wound  Pat dry using gauze  Shower [facility] [employer] border   (if wound starts to increase in drainage apply maxorb alginate directly over wound then allevyn border)  Change dressing every 3 days     Done today at [facility]  Return in 1 week     Standing Status:   Future     Standing Expiration Date:   9/26/2023    Wound compression and edema control     Compression Stocking Spandagrip F doubled     Remove compression stockings every night HS and re-apply first thing qAM  Follow daily skin care as instructed      Avoid prolonged standing in one place      Elevate leg(s) above the level of the heart when sitting or as much as possible  Standing Status:   Future     Standing Expiration Date:   9/26/2023        Diagnosis ICD-10-CM Associated Orders   1  Chronic venous hypertension (idiopathic) with ulcer of right lower extremity (HCC)  I87 311 Wound cleansing and dressings    L92 061 Wound compression and edema control   2   Non-pressure chronic ulcer of right lower leg, limited to breakdown of skin ([facility] )  L93 751

## 2022-09-26 NOTE — PATIENT INSTRUCTIONS
Orders Placed This Encounter   Procedures    Wound cleansing and dressings     Right lower leg wound    Wash your hands with soap and water  Remove old dressing, discard into plastic bag and place in trash  Cleanse the wound with mild soap and waterprior to applying a clean dressing  Do not use tissue or cotton balls  Do not scrub the wound  Pat dry using gauze  Shower [employer] border  (if wound starts to increase in drainage apply maxorb alginate directly over wound then allevyn border)  Change dressing every 3 days     Done today at [facility]  Return in 1 week     Standing Status:   Future     Standing Expiration Date:   9/26/2023    Wound compression and edema control     Compression Stocking Spandagrip F doubled     Remove compression stockings every night HS and re-apply first thing qAM  Follow daily skin care as instructed  Avoid prolonged standing in one place  Elevate leg(s) above the level of the heart when sitting or as much as possible       Standing Status:   Future     Standing Expiration Date:   9/26/2023

## 2022-10-03 ENCOUNTER — OFFICE VISIT (OUTPATIENT)
Dept: WOUND CARE | Facility: HOSPITAL | Age: 77
End: 2022-10-03
Payer: COMMERCIAL

## 2022-10-03 VITALS
TEMPERATURE: 98.3 F | DIASTOLIC BLOOD PRESSURE: 84 MMHG | HEART RATE: 80 BPM | SYSTOLIC BLOOD PRESSURE: 132 MMHG | OXYGEN SATURATION: 18 %

## 2022-10-03 DIAGNOSIS — I87.311 CHRONIC VENOUS HYPERTENSION (IDIOPATHIC) WITH ULCER OF RIGHT LOWER EXTREMITY (HCC): Primary | ICD-10-CM

## 2022-10-03 DIAGNOSIS — L97.911 NON-PRESSURE CHRONIC ULCER OF RIGHT LOWER LEG, LIMITED TO BREAKDOWN OF SKIN (HCC): ICD-10-CM

## 2022-10-03 DIAGNOSIS — L97.919 CHRONIC VENOUS HYPERTENSION (IDIOPATHIC) WITH ULCER OF RIGHT LOWER EXTREMITY (HCC): Primary | ICD-10-CM

## 2022-10-03 PROCEDURE — 99213 OFFICE O/P EST LOW 20 MIN: CPT | Performed by: FAMILY MEDICINE

## 2022-10-03 PROCEDURE — 29581 APPL MULTLAYER CMPRN SYS LEG: CPT

## 2022-10-03 NOTE — PROGRESS NOTES
Cast Application    Date/Time: 10/3/2022 10:05 AM  Performed by: Mario Baird RN  Authorized by: Marie Mason DO   Universal Protocol:  Consent: Verbal consent obtained  Consent given by: patient  Patient understanding: patient states understanding of the procedure being performed  Patient identity confirmed: verbally with patient      Pre-procedure details:     Sensation:  Normal  Procedure details:     Laterality:  Right    Location:  Leg    Leg:  R lower leg    Strapping: no  Cast type:  Multi-layer compression short leg      Supplies:  2 layer wrap  Post-procedure details:     Pain:  Improved    Sensation:  Normal    Patient tolerance of procedure: Tolerated well, no immediate complications  Comments:      Coflex TLC Multilayer wrap procedure     Before application, ROB and/or TBI determined to be adequate for healing and application of compression  Lower extremity washed prior to application of compression wrap  With the foot in dorsiflexed position, coflex was applied as per physician orders without complications or complaint of pain  The procedure was tolerated well  Toes warm & pink post application  Patient provided education & reinforced to observe toes for any discoloration, swelling or tingling and instructed when to report to the [address] [facility],[address] or to remove compression  Wound care as per providers orders, patient tolerated well

## 2022-10-03 NOTE — PATIENT INSTRUCTIONS
Orders Placed This Encounter   Procedures    Wound cleansing and dressings     Wash your hands with soap and water  Remove old dressing, discard into plastic bag and place in trash  Cleanse the wound with mild soap and water prior to applying a clean dressing  Do not use tissue or cotton balls  Do not scrub the wound  Pat dry using gauze  Shower: yes with cast cover  Apply moisturizer to skin surrounding wound  Apply maxorb ag to the right lower leg wound  Cover with gauze  Secure with coflex TLC  Change dressing weekly at [facility]  If it needs to be changed sooner call the [facility]  Done today at the [facility]  Next appointment 1 week  Standing Status:   Future     Standing Expiration Date:   10/3/2023    Wound compression and edema control     Coflex TLC Multi-layer compression wrap Instructions    Keep compression wrap/wraps clean and dry  If wraps are too tight and you experience numbness/tingling, call the wound center  If after hours, remove wraps or proceed to nearest E R  and call wound center in AM     [facility] will be changed weekly    Avoid prolonged standing in one place  Elevate leg(s) above the level of the heart when sitting or as much as possible       Standing Status:   Future     Standing Expiration Date:   10/3/2023

## 2022-10-03 NOTE — PROGRESS NOTES
Patient ID: Donte Proctor is a 78 y o  male Date of Birth 1945     Chief Complaint  Chief Complaint   Patient presents with    Follow Up Wound Care Visit     Right lower leg wound       Allergies  Marijuana (cannabis sativa)    Assessment:    No problem-specific Assessment & Plan notes found for this encounter  Diagnoses and all orders for this visit:    Chronic venous hypertension (idiopathic) with ulcer of right lower extremity (HCC)  -     Wound cleansing and dressings; Future  -     Wound compression and edema control; Future  -     Cast Application    Non-pressure chronic ulcer of right lower leg, limited to breakdown of skin ([address] )              Procedures    Plan:   Wound is minimally changed  Change wound management to silver alginate, see wound orders below  Increase compression to coflex  Keep legs elevated whenever seated and avoid prolonged standing  Followup in 1 week or call sooner with questions or concerns    Wound 09/19/22 Venous Ulcer Pretibial Right (Active)   Wound Image Images linked 10/03/22 0937   Wound Description Pink 10/03/22 0937   Kiki-wound Assessment Edema 10/03/22 0937   Wound Length (cm) 0 1 cm 10/03/22 0937   Wound Width (cm) 0 1 cm 10/03/22 0937   Wound Depth (cm) 0 1 cm 10/03/22 0937   Wound Surface Area (cm^2) 0 01 cm^2 10/03/22 0937   Wound Volume (cm^3) 0 001 cm^3 10/03/22 0937   Calculated Wound Volume (cm^3) 0 cm^3 10/03/22 0937   Change in Wound Size % 100 10/03/22 0937   Drainage Amount Moderate 10/03/22 0937   Drainage Description Serous; Yellow 10/03/22 0937   Non-staged Wound Description Full thickness 10/03/22 0937   Dressing Status Intact 10/03/22 0937       Wound 09/19/22 Venous Ulcer Pretibial Right (Active)   Date First Assessed/Time First Assessed: 09/19/22 0925   Pre-Existing Wound: No  Primary Wound Type: Venous Ulcer  Location: Pretibial  Wound Location Orientation: Right       Subjective:            Patient presents for followup of right lower extremity venous ulcer  No increased pain or drainage  No new complaints  Has been using foam dressing on the wound and double layer of tubigrips for compression  The following portions of the patient's history were reviewed and updated as appropriate:   He  has a past medical history of Atrial fibrillation ([place] [address] ) and Hypertension  He   Patient Active Problem List    Diagnosis Date Noted    Other cardiomyopathy ([place] [address] ) 11/15/2021    Secondary hyperparathyroidism of renal origin ([place] [address] ) 06/09/2020    Persistent atrial fibrillation ([place] [address] ) 12/03/2018    Vitamin D deficiency 07/03/2018    Low bicarbonate 07/03/2018    Stage 4 chronic kidney disease ([place] [address] ) 07/03/2018    Benign hypertension with CKD (chronic kidney disease) stage III ([place] [address] ) 07/03/2018    Bilateral leg edema 07/03/2018     He  reports that he has never smoked  He has never used smokeless tobacco  He reports current alcohol use of about 4 0 standard drinks of alcohol per week  He reports that he does not use drugs    Current Outpatient Medications   Medication Sig Dispense Refill    amLODIPine (NORVASC) 10 mg tablet take 1 tablet by mouth twice a day 60 tablet 11    Ascorbic Acid (VITAMIN C) 500 MG/5ML LIQD Take 1 tablet by mouth daily      atenolol (TENORMIN) 25 mg tablet take 2 tablets by mouth IN THE MORNING AND 1 TABLET IN THE EVENING 270 tablet 2    atorvastatin (LIPITOR) 20 mg tablet take 1 tablet by mouth once daily 90 tablet 1    Cholecalciferol (VITAMIN D) 2000 units tablet Take 2,000 Units by mouth daily      cloNIDine (CATAPRES) 0 1 mg tablet take 1 tablet by mouth every 12 hours 180 tablet 3    Pradaxa 150 MG capsu take 1 capsule by mouth twice a day 180 capsule 2    silver sulfadiazine (SILVADENE,SSD) 1 % cream Apply topically daily 50 g 0    valACYclovir (VALTREX) 1,000 mg tablet Take 1 tablet (1,000 mg total) by mouth 3 (three) times a day for 7 days (Patient not taking: Reported on 8/29/2022) 21 tablet 0     No current facility-administered medications for this visit  He is allergic to marijuana (cannabis sativa)       Review of Systems   Constitutional: Negative for chills and fever  HENT: Negative for congestion and sneezing  Respiratory: Negative for cough  Cardiovascular: Positive for leg swelling  Skin: Positive for wound  Psychiatric/Behavioral: Negative for agitation  Objective:       Wound 09/19/22 Venous Ulcer Pretibial Right (Active)   Wound Image Images linked 10/03/22 0937   Wound Description Pink 10/03/22 0937   Kiki-wound Assessment Edema 10/03/22 0937   Wound Length (cm) 0 1 cm 10/03/22 0937   Wound Width (cm) 0 1 cm 10/03/22 0937   Wound Depth (cm) 0 1 cm 10/03/22 0937   Wound Surface Area (cm^2) 0 01 cm^2 10/03/22 0937   Wound Volume (cm^3) 0 001 cm^3 10/03/22 0937   Calculated Wound Volume (cm^3) 0 cm^3 10/03/22 0937   Change in Wound Size % 100 10/03/22 0937   Drainage Amount Moderate 10/03/22 0937   Drainage Description Serous; Yellow 10/03/22 0937   Non-staged Wound Description Full thickness 10/03/22 0937   Dressing Status Intact 10/03/22 0937       /84   Pulse 80   Temp 98 3 °F (36 8 °C)   SpO2 (!) 18%     Physical Exam  Vitals reviewed  Constitutional:       General: He is not in acute distress  Appearance: Normal appearance  He is not ill-appearing, toxic-appearing or diaphoretic  HENT:      Head: Normocephalic and atraumatic  Right Ear: External ear normal       Left Ear: External ear normal    Eyes:      Conjunctiva/sclera: Conjunctivae normal    Pulmonary:      Effort: Pulmonary effort is normal  No respiratory distress  Musculoskeletal:      Cervical back: Neck supple  Right lower leg: Edema present  Left lower leg: Edema present  Skin:     Comments: See wound assessment   Neurological:      Mental Status: He is alert     Psychiatric:         Mood and Affect: Mood normal          Behavior: Behavior normal            Wound 09/19/22 Venous Ulcer Pretibial Right (Active)   Wound Image   10/03/22 0937   Wound Description Pink 10/03/22 0937   Kiki-wound Assessment Edema 10/03/22 0937   Wound Length (cm) 0 1 cm 10/03/22 0937   Wound Width (cm) 0 1 cm 10/03/22 0937   Wound Depth (cm) 0 1 cm 10/03/22 0937   Wound Surface Area (cm^2) 0 01 cm^2 10/03/22 0937   Wound Volume (cm^3) 0 001 cm^3 10/03/22 0937   Calculated Wound Volume (cm^3) 0 cm^3 10/03/22 0937   Change in Wound Size % 100 10/03/22 0937   Undermining 0 2 09/19/22 0925   Undermining is depth extending from 12-12 09/19/22 0925   Drainage Amount Moderate 10/03/22 0937   Drainage Description Serous; Yellow 10/03/22 0937   Non-staged Wound Description Full thickness 10/03/22 0937   Treatments Cleansed 09/19/22 0925   Wound packed? No 09/19/22 0925   Dressing Changed Changed 09/19/22 0925   Patient Tolerance Tolerated well 09/19/22 0925   Dressing Status Intact 10/03/22 0937                         Wound Instructions:  Orders Placed This Encounter   Procedures    Wound cleansing and dressings     Wash your hands with soap and water  Remove old dressing, discard into plastic bag and place in trash  Cleanse the wound with mild soap and water prior to applying a clean dressing  Do not use tissue or cotton balls  Do not scrub the wound  Pat dry using gauze  Shower: yes with cast cover  Apply moisturizer to skin surrounding wound  Apply maxorb ag to the right lower leg wound  Cover with gauze  Secure with coflex TLC  Change dressing weekly at [facility]  If it needs to be changed sooner call the [facility]  Done today at the [facility]  Next appointment 1 week  Standing Status:   Future     Standing Expiration Date:   10/3/2023    Wound compression and edema control     Coflex TLC Multi-layer compression wrap Instructions    Keep compression wrap/wraps clean and dry  If wraps are too tight and you experience numbness/tingling, call the wound center   If after hours, remove wraps or proceed to nearest E R  and call wound center in AM     Wrap will be changed weekly    Avoid prolonged standing in one place  Elevate leg(s) above the level of the heart when sitting or as much as possible  Standing Status:   Future     Standing Expiration Date:   70/5/5350    Cast Application     This order was created via procedure documentation        Diagnosis ICD-10-CM Associated Orders   1  Chronic venous hypertension (idiopathic) with ulcer of right lower extremity ([facility])  I87 311 Wound cleansing and dressings    L97 918 Wound compression and edema control     Cast Application   2   Non-pressure chronic ulcer of right lower leg, limited to breakdown of skin ([facility] [address] )  L97 915

## 2022-10-10 ENCOUNTER — OFFICE VISIT (OUTPATIENT)
Dept: WOUND CARE | Facility: HOSPITAL | Age: 77
End: 2022-10-10
Payer: COMMERCIAL

## 2022-10-10 VITALS
TEMPERATURE: 97.6 F | DIASTOLIC BLOOD PRESSURE: 76 MMHG | SYSTOLIC BLOOD PRESSURE: 118 MMHG | RESPIRATION RATE: 18 BRPM | HEART RATE: 66 BPM

## 2022-10-10 DIAGNOSIS — L97.911 NON-PRESSURE CHRONIC ULCER OF RIGHT LOWER LEG, LIMITED TO BREAKDOWN OF SKIN (HCC): ICD-10-CM

## 2022-10-10 DIAGNOSIS — L97.919 CHRONIC VENOUS HYPERTENSION (IDIOPATHIC) WITH ULCER OF RIGHT LOWER EXTREMITY (HCC): Primary | ICD-10-CM

## 2022-10-10 DIAGNOSIS — I87.311 CHRONIC VENOUS HYPERTENSION (IDIOPATHIC) WITH ULCER OF RIGHT LOWER EXTREMITY (HCC): Primary | ICD-10-CM

## 2022-10-10 PROCEDURE — 99212 OFFICE O/P EST SF 10 MIN: CPT | Performed by: FAMILY MEDICINE

## 2022-10-10 PROCEDURE — 99213 OFFICE O/P EST LOW 20 MIN: CPT | Performed by: FAMILY MEDICINE

## 2022-10-10 NOTE — PATIENT INSTRUCTIONS
Orders Placed This Encounter   Procedures    Wound cleansing and dressings     Wound cleansing and dressings       Wash your hands with soap and water  Cleanse the wound with mild soap and water   Do not scrub the wound  Pat dry  Shower: yes  Apply moisturizer to skin  Applied Spandagrip F           Compression Stocking    Remove compression stockings every night HS and re-apply first thing qAM  Follow daily skin care as instructed  Avoid prolonged standing in one place  Elevate leg(s) above the level of the heart when sitting or as much as possible       Standing Status:   Future     Standing Expiration Date:   10/10/2023

## 2022-10-10 NOTE — PROGRESS NOTES
Patient ID: Stephen Pavon is a 78 y o  male Date of Birth 1945     Chief Complaint  Chief Complaint   Patient presents with   • Follow Up Wound Care Visit     Wound care       Allergies  Marijuana (cannabis sativa)    Assessment:    No problem-specific Assessment & Plan notes found for this encounter  Diagnoses and all orders for this visit:    Chronic venous hypertension (idiopathic) with ulcer of right lower extremity (HCC)  -     Cancel: Wound cleansing and dressings; Future  -     Wound cleansing and dressings; Future    Non-pressure chronic ulcer of right lower leg, limited to breakdown of skin ([address] )              Procedures    Plan:    Wound is closed  Moisturize daily  Discussed the importance of long-term edema control  Patient has compression stockings at home that are tighter than these tubigrips and I recommend long-term use of the compression stockings  Followup here in the wound management center p r n  Or call with questions or concerns    Wound 09/19/22 Venous Ulcer Pretibial Right (Active)   Wound Image Images linked 10/10/22 0848   Wound Description Epithelialization 10/10/22 0844   Kiki-wound Assessment Intact 10/10/22 0844   Wound Length (cm) 0 cm 10/10/22 0844   Wound Width (cm) 0 cm 10/10/22 0844   Wound Depth (cm) 0 cm 10/10/22 0844   Wound Surface Area (cm^2) 0 cm^2 10/10/22 0844   Wound Volume (cm^3) 0 cm^3 10/10/22 0844   Calculated Wound Volume (cm^3) 0 cm^3 10/10/22 0844   Change in Wound Size % 100 10/10/22 0844   Treatments Cleansed 10/10/22 0844   Wound packed? No 10/10/22 0844   Dressing Changed Changed 10/10/22 0844   Patient Tolerance Tolerated well 10/10/22 0844   Dressing Status Intact 10/10/22 0844       Wound 09/19/22 Venous Ulcer Pretibial Right (Active)   Date First Assessed/Time First Assessed: 09/19/22 0925   Pre-Existing Wound: No  Primary Wound Type: Venous Ulcer  Location: Pretibial  Wound Location Orientation: Right       Subjective:            Patient presents for followup of right lower extremity venous ulcer  No significant pain or drainage  Has had silver alginate on the wound and coflex for compression      The following portions of the patient's history were reviewed and updated as appropriate:   He  has a past medical history of Atrial fibrillation ([place] [address] ) and Hypertension  He   Patient Active Problem List    Diagnosis Date Noted   • Other cardiomyopathy ([place] [address] ) 11/15/2021   • Secondary hyperparathyroidism of renal origin ([place] [address] ) 06/09/2020   • Persistent atrial fibrillation ([place] [address] ) 12/03/2018   • Vitamin D deficiency 07/03/2018   • Low bicarbonate 07/03/2018   • Stage 4 chronic kidney disease ([place] [address] ) 07/03/2018   • Benign hypertension with CKD (chronic kidney disease) stage III ([place] [address] ) 07/03/2018   • Bilateral leg edema 07/03/2018     He  reports that he has never smoked  He has never used smokeless tobacco  He reports current alcohol use of about 4 0 standard drinks of alcohol per week  He reports that he does not use drugs    Current Outpatient Medications   Medication Sig Dispense Refill   • amLODIPine (NORVASC) 10 mg tablet take 1 tablet by mouth twice a day 60 tablet 11   • Ascorbic Acid (VITAMIN C) 500 MG/5ML LIQD Take 1 tablet by mouth daily     • atenolol (TENORMIN) 25 mg tablet take 2 tablets by mouth IN THE MORNING AND 1 TABLET IN THE EVENING 270 tablet 2   • atorvastatin (LIPITOR) 20 mg tablet take 1 tablet by mouth once daily 90 tablet 1   • Cholecalciferol (VITAMIN D) 2000 units tablet Take 2,000 Units by mouth daily     • cloNIDine (CATAPRES) 0 1 mg tablet take 1 tablet by mouth every 12 hours 180 tablet 3   • Pradaxa 150 MG capsu take 1 capsule by mouth twice a day 180 capsule 2   • silver sulfadiazine (SILVADENE,SSD) 1 % cream Apply topically daily 50 g 0   • valACYclovir (VALTREX) 1,000 mg tablet Take 1 tablet (1,000 mg total) by mouth 3 (three) times a day for 7 days (Patient not taking: Reported on 8/29/2022) 21 tablet 0     No current facility-administered medications for this visit  He is allergic to marijuana (cannabis sativa)       Review of Systems   Constitutional: Negative for chills and fever  HENT: Negative for congestion and sneezing  Respiratory: Negative for cough  Cardiovascular: Positive for leg swelling  Skin: Positive for wound  Psychiatric/Behavioral: Negative for agitation  Objective:       Wound 09/19/22 Venous Ulcer Pretibial Right (Active)   Wound Image Images linked 10/10/22 0848   Wound Description Epithelialization 10/10/22 0844   Kiki-wound Assessment Intact 10/10/22 0844   Wound Length (cm) 0 cm 10/10/22 0844   Wound Width (cm) 0 cm 10/10/22 0844   Wound Depth (cm) 0 cm 10/10/22 0844   Wound Surface Area (cm^2) 0 cm^2 10/10/22 0844   Wound Volume (cm^3) 0 cm^3 10/10/22 0844   Calculated Wound Volume (cm^3) 0 cm^3 10/10/22 0844   Change in Wound Size % 100 10/10/22 0844   Treatments Cleansed 10/10/22 0844   Wound packed? No 10/10/22 0844   Dressing Changed Changed 10/10/22 0844   Patient Tolerance Tolerated well 10/10/22 0844   Dressing Status Intact 10/10/22 0844       /76   Pulse 66   Temp 97 6 °F (36 4 °C)   Resp 18     Physical Exam  Vitals reviewed  Constitutional:       General: He is not in acute distress  Appearance: Normal appearance  He is obese  He is not ill-appearing, toxic-appearing or diaphoretic  HENT:      Head: Normocephalic and atraumatic  Right Ear: External ear normal       Left Ear: External ear normal    Eyes:      Conjunctiva/sclera: Conjunctivae normal    Pulmonary:      Effort: Pulmonary effort is normal  No respiratory distress  Musculoskeletal:      Cervical back: Neck supple  Right lower leg: Edema present  Left lower leg: Edema present  Skin:     Comments: Wound appears closed   Neurological:      Mental Status: He is alert     Psychiatric:         Mood and Affect: Mood normal          Behavior: Behavior normal              Wound 09/19/22 Venous Ulcer Pretibial Right (Active)   Wound Image   10/10/22 0848   Wound Description Epithelialization 10/10/22 0844   Kiki-wound Assessment Intact 10/10/22 0844   Wound Length (cm) 0 cm 10/10/22 0844   Wound Width (cm) 0 cm 10/10/22 0844   Wound Depth (cm) 0 cm 10/10/22 0844   Wound Surface Area (cm^2) 0 cm^2 10/10/22 0844   Wound Volume (cm^3) 0 cm^3 10/10/22 0844   Calculated Wound Volume (cm^3) 0 cm^3 10/10/22 0844   Change in Wound Size % 100 10/10/22 0844   Undermining 0 2 09/19/22 0925   Undermining is depth extending from 12-12 09/19/22 0925   Drainage Amount Moderate 10/03/22 0937   Drainage Description Serous; Yellow 10/03/22 0937   Non-staged Wound Description Full thickness 10/03/22 0937   Treatments Cleansed 10/10/22 0844   Wound packed? No 10/10/22 0844   Dressing Changed Changed 10/10/22 0844   Patient Tolerance Tolerated well 10/10/22 0844   Dressing Status Intact 10/10/22 0844                       Wound Instructions:  Orders Placed This Encounter   Procedures   • Wound cleansing and dressings     Wound cleansing and dressings       Wash your hands with soap and water  Cleanse the wound with mild soap and water   Do not scrub the wound  Pat dry  Shower: yes  Apply moisturizer to skin  Applied Spandagrip F           Compression Stocking    Remove compression stockings every night HS and re-apply first thing qAM  Follow daily skin care as instructed  Avoid prolonged standing in one place  Elevate leg(s) above the level of the heart when sitting or as much as possible  Standing Status:   Future     Standing Expiration Date:   10/10/2023        Diagnosis ICD-10-CM Associated Orders   1  Chronic venous hypertension (idiopathic) with ulcer of right lower extremity (HCC)  I87 311 Wound cleansing and dressings    L97 919    2   Non-pressure chronic ulcer of right lower leg, limited to breakdown of skin ([address] )  L97 911

## 2022-10-25 ENCOUNTER — OFFICE VISIT (OUTPATIENT)
Dept: FAMILY MEDICINE CLINIC | Facility: CLINIC | Age: 77
End: 2022-10-25

## 2022-10-25 VITALS
TEMPERATURE: 98.2 F | HEIGHT: 65 IN | DIASTOLIC BLOOD PRESSURE: 80 MMHG | HEART RATE: 82 BPM | WEIGHT: 161 LBS | SYSTOLIC BLOOD PRESSURE: 130 MMHG | BODY MASS INDEX: 26.82 KG/M2 | OXYGEN SATURATION: 97 %

## 2022-10-25 DIAGNOSIS — I12.9 BENIGN HYPERTENSION WITH CKD (CHRONIC KIDNEY DISEASE) STAGE III (HCC): ICD-10-CM

## 2022-10-25 DIAGNOSIS — I42.8 OTHER CARDIOMYOPATHY (HCC): ICD-10-CM

## 2022-10-25 DIAGNOSIS — N18.30 BENIGN HYPERTENSION WITH CKD (CHRONIC KIDNEY DISEASE) STAGE III (HCC): ICD-10-CM

## 2022-10-25 DIAGNOSIS — R60.0 BILATERAL LEG EDEMA: Primary | ICD-10-CM

## 2022-10-25 DIAGNOSIS — I48.19 PERSISTENT ATRIAL FIBRILLATION (HCC): ICD-10-CM

## 2022-10-25 NOTE — PROGRESS NOTES
Name: Nando Gatica      : 1945      MRN: [id number]  Encounter Provider: Soniya Amador DO  Encounter Date: 10/25/2022   Encounter department: [facility]    Assessment & Plan     Caution with added salts, salty foods such as lunch meats, spices etc         Chief Complaint   Patient presents with   • Follow-up     BMI Counseling: Body mass index is 27 21 kg/m²  The BMI is above normal  Nutrition recommendations include reducing portion sizes and consuming healthier snacks  1  Bilateral leg edema    2  Benign hypertension with CKD (chronic kidney disease) stage III (HCC)    3  Persistent atrial fibrillation (HCC)    4  Other cardiomyopathy ([facility] )      BMI Counseling: Body mass index is 27 21 kg/m²  The BMI is above normal  Nutrition recommendations include decreasing portion sizes  Exercise recommendations include exercising 3-5 times per week  Patient referred to PCP  Rationale for BMI follow-up plan is due to patient being overweight or obese  Subjective     Recent DC from wound center- wound closed, using compression stockings- has 4 sets  Yearly cardiology visit coming up  Reviewed prior labs - stage 3 CKD  I have spent 30 minutes with Patient  today in which greater than 50% of this time was spent in counseling/coordination of care regarding Diagnostic results, Risks and benefits of tx options, Intructions for management, Importance of tx compliance, Risk factor reductions and Impressions  Review of Systems   Constitutional: Negative  HENT: Negative  Eyes: Negative  Respiratory: Negative  Cardiovascular: Negative  Gastrointestinal: Negative  Genitourinary: Negative  Musculoskeletal: Negative  Skin: Negative  Neurological: Negative  Psychiatric/Behavioral: Negative  Past Medical History:   Diagnosis Date   • Atrial fibrillation ([facility] )    • Hypertension      History reviewed  No pertinent surgical history    Family History   Problem Relation Age of Onset   • Alzheimer's disease Mother    • Coronary artery disease Father    • Heart defect Father         congenital    • Stroke Father    • Other Father         cerebral hemorrhage      Social History     Socioeconomic History   • Marital status: Single     Spouse name: None   • Number of children: None   • Years of education: None   • Highest education level: None   Occupational History   • Occupation: Construction   Tobacco Use   • Smoking status: Never Smoker   • Smokeless tobacco: Never Used   Vaping Use   • Vaping Use: Never used   Substance and Sexual Activity   • Alcohol use:  Yes     Alcohol/week: 4 0 standard drinks     Types: 4 Shots of liquor per week   • Drug use: No   • Sexual activity: Not Currently   Other Topics Concern   • None   Social History Narrative   • None     Social Determinants of Health     Financial Resource Strain: Not on file   Food Insecurity: Not on file   Transportation Needs: Not on file   Physical Activity: Not on file   Stress: Not on file   Social Connections: Not on file   Intimate Partner Violence: Not on file   Housing Stability: Not on file     Current Outpatient Medications on File Prior to Visit   Medication Sig   • amLODIPine (NORVASC) 10 mg tablet take 1 tablet by mouth twice a day   • Ascorbic Acid (VITAMIN C) 500 MG/5ML LIQD Take 1 tablet by mouth daily   • atenolol (TENORMIN) 25 mg tablet take 2 tablets by mouth IN THE MORNING AND 1 TABLET IN THE EVENING   • atorvastatin (LIPITOR) 20 mg tablet take 1 tablet by mouth once daily   • Cholecalciferol (VITAMIN D) 2000 units tablet Take 2,000 Units by mouth daily   • cloNIDine (CATAPRES) 0 1 mg tablet take 1 tablet by mouth every 12 hours   • Pradaxa 150 MG capsu take 1 capsule by mouth twice a day   • silver sulfadiazine (SILVADENE,SSD) 1 % cream Apply topically daily   • valACYclovir (VALTREX) 1,000 mg tablet Take 1 tablet (1,000 mg total) by mouth 3 (three) times a day for 7 days     Allergies   Allergen Reactions   • Marijuana (Cannabis Sativa) Swelling     Immunization History   Administered Date(s) Administered   • COVID-19 J&J (Octavio) vaccine 0 5 mL 03/17/2021   • COVID-19 PFIZER VACCINE 0 3 ML IM 12/01/2021   • Pneumococcal Conjugate 13-Valent 11/09/2015   • TD (adult) Preservative Free 08/29/2022   • Zoster 04/06/2015       Objective     /80 (BP Location: Left arm, Patient Position: Sitting, Cuff Size: Standard)   Pulse 82   Temp 98 2 °F (36 8 °C) (Temporal)   Ht 5' 4 5" (1 638 m)   Wt 73 kg (161 lb)   SpO2 97%   BMI 27 21 kg/m²     Physical Exam  Constitutional:       Appearance: He is well-developed  HENT:      Head: Normocephalic and atraumatic  Right Ear: External ear normal       Left Ear: External ear normal       Nose: Nose normal       Mouth/Throat:      Mouth: Mucous membranes are moist       Pharynx: Oropharynx is clear  Eyes:      Conjunctiva/sclera: Conjunctivae normal       Pupils: Pupils are equal, round, and reactive to light  Cardiovascular:      Rate and Rhythm: Normal rate  Rhythm irregular  Heart sounds: Normal heart sounds  Pulmonary:      Effort: Pulmonary effort is normal       Breath sounds: Normal breath sounds  Musculoskeletal:      Cervical back: Normal range of motion and neck supple  Skin:     General: Skin is warm and dry  Neurological:      Mental Status: He is alert and oriented to person, place, and time  Deep Tendon Reflexes: Reflexes are normal and symmetric  Psychiatric:         Mood and Affect: Mood normal          Behavior: Behavior normal          Thought Content:  Thought content normal          Judgment: Judgment normal        Chapo Fabrice, DO

## 2022-11-15 NOTE — PROGRESS NOTES
Cardiology Follow Up    [facility]  1945  [id number]  [address] [place] [id number] [phone]    1  Essential (primary) hypertension  Echo complete w/ contrast if indicated      2  Pure hypercholesterolemia  Echo complete w/ contrast if indicated      3  Atrial fibrillation, unspecified type ([facility] [address] )  POCT ECG    Echo complete w/ contrast if indicated      4  Aortic valve disease  Echo complete w/ contrast if indicated          Interval History: Patient is here for a follow-up visit   Patient has Afib on rate control and Pradaxa  He has HTN, HLD and bicuspid AVD with  AS    He is being followed by Nephrology  [facility] is felt to have CRI in reference to long-term hypertension    A lipid profile done 4/2022 demonstrated total cholesterol of 142 with an HDL of 76 and a calculated LDL of 52  Echo done 5/2022 demonstrated preserved LV systolic function with LVEF of 60%  There was LVH  A bicuspid AV is noted with moderate AS and a mean gradient of 15 mmHg  There is moderate TR with a mild elevation in PAP  There was mild LAE and mild dilatation of the ascending aorta at 4 2 cm  There was no significant change compared to a prior study done May 2021  Patient has been well  He is had no chest pain or significant dyspnea  His vital signs are stable today  EKG today demonstrates atrial fibrillation with left axis deviation with nonspecific ST segment changes with no significant change compared to a prior tracing done 11/16/2021        Patient Active Problem List   Diagnosis   • Vitamin D deficiency   • Low bicarbonate   • Stage 4 chronic kidney disease ([address] )   • Benign hypertension with CKD (chronic kidney disease) stage III (HCC)   • Bilateral leg edema   • Persistent atrial fibrillation (HCC)   • Secondary hyperparathyroidism of renal origin ([facility] [address] )   • Other cardiomyopathy ([facility] [address] )     Past Medical History: Diagnosis Date   • Atrial fibrillation ([facility] [address] )    • Hypertension      Social History     Socioeconomic History   • Marital status: Single     Spouse name: Not on file   • Number of children: Not on file   • Years of education: Not on file   • Highest education level: Not on file   Occupational History   • Occupation: Construction   Tobacco Use   • Smoking status: Never   • Smokeless tobacco: Never   Vaping Use   • Vaping Use: Never used   Substance and Sexual Activity   • Alcohol use: Yes     Alcohol/week: 4 0 standard drinks     Types: 4 Shots of liquor per week   • Drug use: No   • Sexual activity: Not Currently   Other Topics Concern   • Not on file   Social History Narrative   • Not on file     Social Determinants of Health     Financial Resource Strain: Not on file   Food Insecurity: Not on file   Transportation Needs: Not on file   Physical Activity: Not on file   Stress: Not on file   Social Connections: Not on file   Intimate Partner Violence: Not on file   Housing Stability: Not on file      Family History   Problem Relation Age of Onset   • Alzheimer's disease Mother    • Coronary artery disease Father    • Heart defect Father         congenital    • Stroke Father    • Other Father         cerebral hemorrhage      No past surgical history on file      Current Outpatient Medications:   •  amLODIPine (NORVASC) 10 mg tablet, take 1 tablet by mouth twice a day, Disp: 60 tablet, Rfl: 11  •  Ascorbic Acid (VITAMIN C) 500 MG/5ML LIQD, Take 1 tablet by mouth daily, Disp: , Rfl:   •  atenolol (TENORMIN) 25 mg tablet, take 2 tablets by mouth IN THE MORNING AND 1 TABLET IN THE EVENING, Disp: 270 tablet, Rfl: 2  •  atorvastatin (LIPITOR) 20 mg tablet, take 1 tablet by mouth once daily, Disp: 90 tablet, Rfl: 1  •  Cholecalciferol (VITAMIN D) 2000 units tablet, Take 2,000 Units by mouth daily, Disp: , Rfl:   •  cloNIDine (CATAPRES) 0 1 mg tablet, take 1 tablet by mouth every 12 hours, Disp: 180 tablet, Rfl: 3  •  Pradaxa 150 MG capsu, take 1 capsule by mouth twice a day, Disp: 180 capsule, Rfl: 2  Allergies   Allergen Reactions   • Marijuana (Cannabis Sativa) Swelling       Labs:not applicable  Imaging: No results found  Review of Systems:  Review of Systems   All other systems reviewed and are negative  Physical Exam:  /80 (BP Location: Right arm, Patient Position: Sitting, Cuff Size: Standard)   Pulse 70   Ht 5' 4 5" (1 638 m)   Wt 73 5 kg (162 lb 1 6 oz)   SpO2 96%   BMI 27 39 kg/m²   Physical Exam  Vitals reviewed  Constitutional:       Appearance: He is well-developed  HENT:      Head: Normocephalic and atraumatic  Cardiovascular:      Rate and Rhythm: Normal rate  Heart sounds: Murmur heard  Pulmonary:      Effort: Pulmonary effort is normal       Breath sounds: Normal breath sounds  Musculoskeletal:      Cervical back: Normal range of motion  Skin:     General: Skin is warm and dry  Neurological:      Mental Status: He is alert and oriented to person, place, and time  Discussion/Summary:I will continue the patient's present medical regimen  The patient appears well compensated  I have asked the patient to call if there is a problem in the interim otherwise I will see the patient in one years time  Patient is due for an echocardiogram prior to the next visit to assess LV wall thickness and systolic function

## 2022-11-16 ENCOUNTER — OFFICE VISIT (OUTPATIENT)
Dept: CARDIOLOGY CLINIC | Facility: CLINIC | Age: 77
End: 2022-11-16

## 2022-11-16 VITALS
HEART RATE: 70 BPM | DIASTOLIC BLOOD PRESSURE: 80 MMHG | OXYGEN SATURATION: 96 % | BODY MASS INDEX: 27.01 KG/M2 | SYSTOLIC BLOOD PRESSURE: 122 MMHG | WEIGHT: 162.1 LBS | HEIGHT: 65 IN

## 2022-11-16 DIAGNOSIS — I10 ESSENTIAL (PRIMARY) HYPERTENSION: Primary | ICD-10-CM

## 2022-11-16 DIAGNOSIS — E78.00 PURE HYPERCHOLESTEROLEMIA: ICD-10-CM

## 2022-11-16 DIAGNOSIS — I35.9 AORTIC VALVE DISEASE: ICD-10-CM

## 2022-11-16 DIAGNOSIS — I48.91 ATRIAL FIBRILLATION, UNSPECIFIED TYPE (HCC): ICD-10-CM

## 2022-11-28 ENCOUNTER — OFFICE VISIT (OUTPATIENT)
Dept: FAMILY MEDICINE CLINIC | Facility: CLINIC | Age: 77
End: 2022-11-28

## 2022-11-28 VITALS
OXYGEN SATURATION: 99 % | HEIGHT: 65 IN | DIASTOLIC BLOOD PRESSURE: 78 MMHG | WEIGHT: 154 LBS | BODY MASS INDEX: 25.66 KG/M2 | TEMPERATURE: 97.5 F | SYSTOLIC BLOOD PRESSURE: 126 MMHG | HEART RATE: 100 BPM

## 2022-11-28 DIAGNOSIS — Z00.00 MEDICARE ANNUAL WELLNESS VISIT, SUBSEQUENT: Primary | ICD-10-CM

## 2022-11-28 NOTE — PROGRESS NOTES
Assessment and Plan:     Problem List Items Addressed This Visit    None  Visit Diagnoses     Medicare annual wellness visit, subsequent    -  Primary           Preventive health issues were discussed with patient, and age appropriate screening tests were ordered as noted in patient's After Visit Summary  Personalized health advice and appropriate referrals for health education or preventive services given if needed, as noted in patient's After Visit Summary  History of Present Illness:     Patient presents for a Medicare Wellness Visit    Medicare PE  Using compression knee jkhigh socks  Patient Care Team:  Luke Mullins DO as PCP - General (Family Medicine)  Lydia Vizcaino MD     Review of Systems:     Review of Systems   Constitutional: Negative  HENT: Negative  Eyes: Negative  Respiratory: Negative  Cardiovascular: Negative  Gastrointestinal: Negative  Genitourinary: Negative  Musculoskeletal: Negative  Skin: Negative  Neurological: Negative  Psychiatric/Behavioral: Negative  Problem List:     Patient Active Problem List   Diagnosis   • Vitamin D deficiency   • Low bicarbonate   • Stage 4 chronic kidney disease ([facility] [address] )   • Benign hypertension with CKD (chronic kidney disease) stage III ([facility])   • Bilateral leg edema   • Persistent atrial fibrillation ([facility])   • Secondary hyperparathyroidism of renal origin ([facility] [address] )   • Other cardiomyopathy ([facility] [address] )      Past Medical and Surgical History:     Past Medical History:   Diagnosis Date   • Atrial fibrillation ([facility] [address] )    • Hypertension      History reviewed  No pertinent surgical history     Family History:     Family History   Problem Relation Age of Onset   • Alzheimer's disease Mother    • Coronary artery disease Father    • Heart defect Father         congenital    • Stroke Father    • Other Father         cerebral hemorrhage       Social History:     Social History     Socioeconomic History   • Marital status: Single Spouse name: None   • Number of children: None   • Years of education: None   • Highest education level: None   Occupational History   • Occupation: Construction   Tobacco Use   • Smoking status: Never   • Smokeless tobacco: Never   Vaping Use   • Vaping Use: Never used   Substance and Sexual Activity   • Alcohol use: Yes     Alcohol/week: 4 0 standard drinks     Types: 4 Shots of liquor per week   • Drug use: No   • Sexual activity: Not Currently   Other Topics Concern   • None   Social History Narrative   • None     Social Determinants of Health     Financial Resource Strain: Low Risk    • Difficulty of Paying Living Expenses: Not hard at all   Food Insecurity: Not on file   Transportation Needs: No Transportation Needs   • Lack of Transportation (Medical): No   • Lack of Transportation (Non-Medical): No   Physical Activity: Not on file   Stress: Not on file   Social Connections: Not on file   Intimate Partner Violence: Not on file   Housing Stability: Not on file      Medications and Allergies:     Current Outpatient Medications   Medication Sig Dispense Refill   • amLODIPine (NORVASC) 10 mg tablet take 1 tablet by mouth twice a day 60 tablet 11   • Ascorbic Acid (VITAMIN C) 500 MG/5ML LIQD Take 1 tablet by mouth daily     • atenolol (TENORMIN) 25 mg tablet take 2 tablets by mouth IN THE MORNING AND 1 TABLET IN THE EVENING 270 tablet 2   • atorvastatin (LIPITOR) 20 mg tablet take 1 tablet by mouth once daily 90 tablet 1   • Cholecalciferol (VITAMIN D) 2000 units tablet Take 2,000 Units by mouth daily     • cloNIDine (CATAPRES) 0 1 mg tablet take 1 tablet by mouth every 12 hours 180 tablet 3   • Pradaxa 150 MG capsu take 1 capsule by mouth twice a day 180 capsule 2     No current facility-administered medications for this visit       Allergies   Allergen Reactions   • Marijuana (Cannabis Sativa) Swelling      Immunizations:     Immunization History   Administered Date(s) Administered   • COVID-19 GIOVANI&GIOVANI Jack vaccine 0 5 mL 03/17/2021   • COVID-19 PFIZER VACCINE 0 3 ML IM 12/01/2021   • Pneumococcal Conjugate 13-Valent 11/09/2015   • TD (adult) Preservative Free 08/29/2022   • Zoster 04/06/2015      Health Maintenance:         Topic Date Due   • Hepatitis C Screening  Never done   • Colorectal Cancer Screening  Never done         Topic Date Due   • Hepatitis B Vaccine (1 of 3 - 3-dose series) Never done   • Pneumococcal Vaccine: 65+ Years (2 - PPSV23 if available, else PCV20) 11/09/2016   • COVID-19 Vaccine (3 - Booster for Octavio series) 04/01/2022   • Influenza Vaccine (1) Never done      Medicare Screening Tests and Risk Assessments:     Tad Mccullough is here for his Subsequent Wellness visit  Health Risk Assessment:   Patient rates overall health as good  Patient feels that their physical health rating is same  Patient is satisfied with their life  Eyesight was rated as same  Hearing was rated as slightly worse  Patient feels that their emotional and mental health rating is same  Patients states they are never, rarely angry  Patient states they are sometimes unusually tired/fatigued  Pain experienced in the last 7 days has been none  Patient states that he has experienced no weight loss or gain in last 6 months  Depression Screening:   PHQ-2 Score: 0      Fall Risk Screening: In the past year, patient has experienced: history of falling in past year    Number of falls: 2 or more  Injured during fall?: No    Feels unsteady when standing or walking?: Yes    Worried about falling?: Yes      Home Safety:  Patient does not have trouble with stairs inside or outside of their home  Patient has working smoke alarms and has no working carbon monoxide detector  Home safety hazards include: none  Nutrition:   Current diet is Regular  Medications:   Patient is currently taking over-the-counter supplements  OTC medications include: see medication list  Patient is able to manage medications       Activities of Daily Living (ADLs)/Instrumental Activities of Daily Living (IADLs):   Walk and transfer into and out of bed and chair?: Yes  Dress and groom yourself?: Yes    Bathe or shower yourself?: Yes    Feed yourself? Yes  Do your laundry/housekeeping?: Yes  Manage your money, pay your bills and track your expenses?: Yes  Make your own meals?: Yes    Do your own shopping?: Yes    Previous Hospitalizations:   Any hospitalizations or ED visits within the last 12 months?: No      Advance Care Planning:   Living will: Yes    Durable POA for healthcare: Yes    Advanced directive: Yes      Cognitive Screening:   Provider or family/friend/caregiver concerned regarding cognition?: No    PREVENTIVE SCREENINGS      Cardiovascular Screening:    General: Screening Not Indicated and History Lipid Disorder      Diabetes Screening:     General: Screening Current      Colorectal Cancer Screening:     General: Screening Not Indicated      Prostate Cancer Screening:    General: Screening Not Indicated      Osteoporosis Screening:    General: Screening Not Indicated      Abdominal Aortic Aneurysm (AAA) Screening:    Risk factors include: age between 73-69 yo        Lung Cancer Screening:     General: Screening Not Indicated      Hepatitis C Screening:    General: Screening Not Indicated    Screening, Brief Intervention, and Referral to Treatment (SBIRT)    Screening  Typical number of drinks in a day: 1  Typical number of drinks in a week: 6  Interpretation: Low risk drinking behavior  AUDIT-C Screenin) How often did you have a drink containing alcohol in the past year? 4 or more times a week  2) How many drinks did you have on a typical day when you were drinking in the past year?  1 to 2  3) How often did you have 6 or more drinks on one occasion in the past year? never    AUDIT-C Score: 4  Interpretation: Score 4-12 (male): POSITIVE screen for alcohol misuse    AUDIT Screenin) How often during the last year have you found that you were not able to stop drinking once you had started? 0 - never  5) How often during the last year have you failed to do what was normally expected from you because of drinking? 0 - never  6) How often during the last year have you needed a first drink in the morning to get yourself going after a heavy drinking session? 0 - never  7) How often during the last year have you had a feeling of guilt or remorse after drinking? 0 - never  8) How often during the last year have you been unable to remember what happened the night before because you had been drinking? 0 - never  9) Have you or someone else been injured as a result of your drinking? 0 - no  10) Has a relative or friend or a doctor or another health worker been concerned about your drinking or suggested you cut down? 0 - no    AUDIT Score: 4  Interpretation: Low risk alcohol consumption    Single Item Drug Screening:  How often have you used an illegal drug (including marijuana) or a prescription medication for non-medical reasons in the past year? never    Single Item Drug Screen Score: 0  Interpretation: Negative screen for possible drug use disorder    No results found  Physical Exam:     /78 (BP Location: Left arm, Patient Position: Sitting, Cuff Size: Standard)   Pulse 100   Temp 97 5 °F (36 4 °C) (Temporal)   Ht 5' 4 5" (1 638 m)   Wt 69 9 kg (154 lb)   SpO2 99%   BMI 26 03 kg/m²     Physical Exam  Vitals and nursing note reviewed  Constitutional:       General: He is not in acute distress  Appearance: He is well-developed  HENT:      Head: Normocephalic and atraumatic  Right Ear: External ear normal       Left Ear: External ear normal       Nose: Nose normal       Mouth/Throat:      Mouth: Mucous membranes are moist       Pharynx: Oropharynx is clear  Eyes:      Conjunctiva/sclera: Conjunctivae normal    Cardiovascular:      Rate and Rhythm: Normal rate  Rhythm irregular  Heart sounds: No murmur heard    Pulmonary: Effort: Pulmonary effort is normal  No respiratory distress  Breath sounds: Normal breath sounds  Abdominal:      Palpations: Abdomen is soft  Tenderness: There is no abdominal tenderness  Musculoskeletal:         General: No swelling  Cervical back: Neck supple  Right lower leg: Edema present  Left lower leg: Edema present  Skin:     General: Skin is warm and dry  Capillary Refill: Capillary refill takes less than 2 seconds  Neurological:      General: No focal deficit present  Mental Status: He is alert and oriented to person, place, and time  Psychiatric:         Mood and Affect: Mood normal          Behavior: Behavior normal          Thought Content:  Thought content normal          Judgment: Judgment normal           Selene Kayser, DO

## 2023-01-05 DIAGNOSIS — I10 ESSENTIAL (PRIMARY) HYPERTENSION: ICD-10-CM

## 2023-01-05 DIAGNOSIS — E78.5 HYPERLIPIDEMIA, UNSPECIFIED HYPERLIPIDEMIA TYPE: ICD-10-CM

## 2023-01-05 RX ORDER — ATENOLOL 25 MG/1
TABLET ORAL
Qty: 270 TABLET | Refills: 2 | Status: SHIPPED | OUTPATIENT
Start: 2023-01-05

## 2023-01-05 RX ORDER — ATORVASTATIN CALCIUM 20 MG/1
TABLET, FILM COATED ORAL
Qty: 90 TABLET | Refills: 1 | Status: SHIPPED | OUTPATIENT
Start: 2023-01-05

## 2023-02-23 DIAGNOSIS — I10 ESSENTIAL HYPERTENSION, MALIGNANT: ICD-10-CM

## 2023-02-23 RX ORDER — AMLODIPINE BESYLATE 10 MG/1
TABLET ORAL
Qty: 60 TABLET | Refills: 11 | Status: SHIPPED | OUTPATIENT
Start: 2023-02-23

## 2023-03-14 DIAGNOSIS — I48.91 ATRIAL FIBRILLATION, UNSPECIFIED TYPE (HCC): ICD-10-CM

## 2023-03-14 RX ORDER — ATENOLOL 100 MG/1
TABLET ORAL
Qty: 180 CAPSULE | Refills: 2 | Status: SHIPPED | OUTPATIENT
Start: 2023-03-14

## 2023-03-23 DIAGNOSIS — I10 ESSENTIAL (PRIMARY) HYPERTENSION: ICD-10-CM

## 2023-03-23 RX ORDER — CLONIDINE HYDROCHLORIDE 0.1 MG/1
TABLET ORAL
Qty: 180 TABLET | Refills: 3 | Status: SHIPPED | OUTPATIENT
Start: 2023-03-23

## 2023-05-01 ENCOUNTER — OFFICE VISIT (OUTPATIENT)
Dept: FAMILY MEDICINE CLINIC | Facility: CLINIC | Age: 78
End: 2023-05-01

## 2023-05-01 VITALS
SYSTOLIC BLOOD PRESSURE: 128 MMHG | DIASTOLIC BLOOD PRESSURE: 74 MMHG | TEMPERATURE: 97.2 F | BODY MASS INDEX: 27.06 KG/M2 | HEART RATE: 82 BPM | HEIGHT: 65 IN | WEIGHT: 162.4 LBS | OXYGEN SATURATION: 96 %

## 2023-05-01 DIAGNOSIS — S46.211A TEAR OF RIGHT BICEPS MUSCLE, INITIAL ENCOUNTER: Primary | ICD-10-CM

## 2023-05-01 DIAGNOSIS — R58 ECCHYMOSIS: ICD-10-CM

## 2023-05-01 DIAGNOSIS — I42.8 OTHER CARDIOMYOPATHY (HCC): ICD-10-CM

## 2023-05-01 DIAGNOSIS — I48.19 PERSISTENT ATRIAL FIBRILLATION (HCC): ICD-10-CM

## 2023-05-01 NOTE — PROGRESS NOTES
Name: Oscar Fermin      : 1945      MRN: [id number]  Encounter Provider: Thuy Quintana DO  Encounter Date: 2023   Encounter department: [address]  Discussed signs/symptoms of compartment syndrome- if any occur to ER  Chief Complaint   Patient presents with    Follow-up    Hypertension    whole arm brused and swollen      Pt currently on blood thinners     Swelling     Right arm since 2023   BMI Counseling: Body mass index is 27 45 kg/m²  The BMI is above normal  Nutrition recommendations include reducing portion sizes, moderation in carbohydrate intake and increasing intake of lean protein  PHQ-2/9 Depression Screening    Little interest or pleasure in doing things: 0 - not at all  Feeling down, depressed, or hopeless: 0 - not at all  PHQ-2 Score: 0  PHQ-2 Interpretation: Negative depression screen     The patient does not have a history of falls  I did not complete a risk assessment for falls  A plan of care for falls was not    documented  1  Tear of right biceps muscle, initial encounter  -     Ambulatory Referral to Orthopedic Surgery; Future    2  Ecchymosis    3  Persistent atrial fibrillation ([address] )    4  Other cardiomyopathy ([address] )           Subjective     Past Thursday moving a piece of concrete, about 100 lbs, felt a pop in right bicep's  Thursday night right arm started swelling  Now whole arm black and blue  Tender lump right upper bicep  Denies loss of sensory to arm, hand  FROM of RUE  No swelling increased since Friday afternoon  Pt with A-fib and on anticoagulation  Review of Systems   Constitutional: Negative  HENT: Negative  Eyes: Negative  Respiratory: Negative  Cardiovascular: Negative  Gastrointestinal: Negative  Genitourinary: Negative  Musculoskeletal:        Right upper arm discomfort  Skin: Negative  Neurological: Positive for numbness  Psychiatric/Behavioral: Negative  Past Medical History:   Diagnosis Date    Atrial fibrillation ([facility] [address] )     Hypertension      No past surgical history on file  Family History   Problem Relation Age of Onset    Alzheimer's disease Mother     Coronary artery disease Father     Heart defect Father         congenital     Stroke Father     Other Father         cerebral hemorrhage      Social History     Socioeconomic History    Marital status: Single     Spouse name: None    Number of children: None    Years of education: None    Highest education level: None   Occupational History    Occupation: Construction   Tobacco Use    Smoking status: Never    Smokeless tobacco: Never   Vaping Use    Vaping Use: Never used   Substance and Sexual Activity    Alcohol use: Yes     Alcohol/week: 4 0 standard drinks     Types: 4 Shots of liquor per week    Drug use: No    Sexual activity: Not Currently   Other Topics Concern    None   Social History Narrative    None     Social Determinants of Health     Financial Resource Strain: Low Risk     Difficulty of Paying Living Expenses: Not hard at all   Food Insecurity: Not on file   Transportation Needs: No Transportation Needs    Lack of Transportation (Medical): No    Lack of Transportation (Non-Medical):  No   Physical Activity: Not on file   Stress: Not on file   Social Connections: Not on file   Intimate Partner Violence: Not on file   Housing Stability: Not on file     Current Outpatient Medications on File Prior to Visit   Medication Sig    amLODIPine (NORVASC) 10 mg tablet take 1 tablet by mouth twice a day    Ascorbic Acid (VITAMIN C) 500 MG/5ML LIQD Take 1 tablet by mouth daily    atenolol (TENORMIN) 25 mg tablet take 2 tablets by mouth IN THE MORNING AND 1 TABLET IN THE EVENING    atorvastatin (LIPITOR) 20 mg tablet take 1 tablet by mouth once daily    Cholecalciferol (VITAMIN D) 2000 units tablet Take 2,000 Units by mouth daily    cloNIDine (CATAPRES) 0 1 mg tablet take 1 tablet "by mouth every 12 hours    Pradaxa 150 MG capsu take 1 capsule by mouth twice a day     Allergies   Allergen Reactions    Marijuana (Cannabis Sativa) Swelling     Immunization History   Administered Date(s) Administered    COVID-19 J&J (Octavio) vaccine 0 5 mL 03/17/2021    COVID-19 PFIZER VACCINE 0 3 ML IM 12/01/2021    Pneumococcal Conjugate 13-Valent 11/09/2015    TD (adult) Preservative Free 08/29/2022    Td (adult), adsorbed 08/29/2022    Zoster 04/06/2015       Objective     /74 (BP Location: Left arm, Patient Position: Sitting, Cuff Size: Standard)   Pulse 82   Temp (!) 97 2 °F (36 2 °C) (Temporal)   Ht 5' 4 5\" (1 638 m)   Wt 73 7 kg (162 lb 6 4 oz)   SpO2 96%   BMI 27 45 kg/m²     Physical Exam  Constitutional:       Appearance: He is well-developed  HENT:      Head: Normocephalic and atraumatic  Right Ear: External ear normal       Left Ear: External ear normal       Nose: Nose normal       Mouth/Throat:      Mouth: Mucous membranes are moist       Pharynx: Oropharynx is clear  Eyes:      Conjunctiva/sclera: Conjunctivae normal       Pupils: Pupils are equal, round, and reactive to light  Cardiovascular:      Rate and Rhythm: Normal rate  Rhythm irregular  Pulses: Normal pulses  Heart sounds: Normal heart sounds  Comments: Good radial pulses  Pulmonary:      Effort: Pulmonary effort is normal       Breath sounds: Normal breath sounds  Musculoskeletal:      Cervical back: Normal range of motion and neck supple  Comments: Tender lump right bicep area  Neg tenderness to other areas of arm  Digits normal color  FROM of right arm  Denies numbness, tingling, tightness or burning sensation of the skin/arm  Skin:     General: Skin is warm and dry  Neurological:      General: No focal deficit present  Mental Status: He is alert and oriented to person, place, and time  Sensory: No sensory deficit        Deep Tendon Reflexes: Reflexes are normal and " symmetric  Comments: Negative neuro deficients of right arm  Psychiatric:         Mood and Affect: Mood normal          Behavior: Behavior normal          Thought Content:  Thought content normal          Judgment: Judgment normal        Jody Gr,

## 2023-05-02 ENCOUNTER — OFFICE VISIT (OUTPATIENT)
Dept: OBGYN CLINIC | Facility: CLINIC | Age: 78
End: 2023-05-02

## 2023-05-02 VITALS
SYSTOLIC BLOOD PRESSURE: 136 MMHG | BODY MASS INDEX: 26.99 KG/M2 | WEIGHT: 162 LBS | HEART RATE: 112 BPM | HEIGHT: 65 IN | DIASTOLIC BLOOD PRESSURE: 93 MMHG

## 2023-05-02 DIAGNOSIS — S46.211A TEAR OF RIGHT BICEPS MUSCLE, INITIAL ENCOUNTER: ICD-10-CM

## 2023-05-02 NOTE — PROGRESS NOTES
Orthopedic Sports Medicine New Patient Visit     Assesment:   70 y o  male with right distal biceps tear    Plan:    I had a long discussion with the patient regarding the injury and treatment plan  We discussed operative versus nonoperative treatment  I explained that nonoperative treatment would likely allow him to return to his normal activities of daily living  He may experience slight weakness or pain long-term with nonoperative treatment  We discussed that surgery is more likely to return him to his normal baseline strength with less pain, however there is risks involved with surgery at both the surgical site as well as due to his baseline A-fib and need for anticoagulation  The patient expressed good understanding and would like to proceed with nonoperative treatment measures  We discussed the extensive bruising is due to his anticoagulation and will resolve over the next few weeks  He has no pain over the distal forearm and his swelling is significantly decreased from his previous visits with his primary care provider  It may return to activities as tolerated as there is minimal risk to additional injury at this point  We did discuss that he could do physical therapy to improve his strength and function, but he would prefer to home exercises at this time and go to therapy if he is unable to return to his normal function  Weeks if he is having any persistent symptoms or concerns  Chief Complaint   Patient presents with    Right Shoulder - Pain     RIGHT BICEP         History of Present Illness: The patient is a 70 y o  senting with pain bruising and swelling throughout the entire right upper extremity  This is been present for 5 days since he was trying to lift a roughly 100 pound object and felt a pop in the distal biceps  Acute pain and then over the next day he had extensive bruising and swelling developed    He was seen by his primary care physician who believed that he had a biceps rupture and he was referred for further treatment  Today he states that his pain is already significantly better and he has been able to use the arm for activities of daily living already  He denies any numbness or tingling at this time  He retired does odd jobs around [employer]  And a steady [employer]     Past Medical, Social and Family History:  Past Medical History:   Diagnosis Date    Atrial fibrillation ([address] )     Hypertension      No past surgical history on file  Allergies   Allergen Reactions    Marijuana (Cannabis Sativa) Swelling     Current Outpatient Medications on File Prior to Visit   Medication Sig Dispense Refill    amLODIPine (NORVASC) 10 mg tablet take 1 tablet by mouth twice a day 60 tablet 11    Ascorbic Acid (VITAMIN C) 500 MG/5ML LIQD Take 1 tablet by mouth daily      atenolol (TENORMIN) 25 mg tablet take 2 tablets by mouth IN THE MORNING AND 1 TABLET IN THE EVENING 270 tablet 2    atorvastatin (LIPITOR) 20 mg tablet take 1 tablet by mouth once daily 90 tablet 1    Cholecalciferol (VITAMIN D) 2000 units tablet Take 2,000 Units by mouth daily      cloNIDine (CATAPRES) 0 1 mg tablet take 1 tablet by mouth every 12 hours 180 tablet 3    Pradaxa 150 MG capsu take 1 capsule by mouth twice a day 180 capsule 2     No current facility-administered medications on file prior to visit  Social History     Socioeconomic History    Marital status: Single     Spouse name: Not on file    Number of children: Not on file    Years of education: Not on file    Highest education level: Not on file   Occupational History    Occupation: Construction   Tobacco Use    Smoking status: Never    Smokeless tobacco: Never   Vaping Use    Vaping Use: Never used   Substance and Sexual Activity    Alcohol use:  Yes     Alcohol/week: 4 0 standard drinks     Types: 4 Shots of liquor per week    Drug use: No    Sexual activity: Not Currently   Other Topics Concern    Not on file   Social "History Narrative    Not on file     Social Determinants of Health     Financial Resource Strain: Low Risk     Difficulty of Paying Living Expenses: Not hard at all   Food Insecurity: Not on file   Transportation Needs: No Transportation Needs    Lack of Transportation (Medical): No    Lack of Transportation (Non-Medical): No   Physical Activity: Not on file   Stress: Not on file   Social Connections: Not on file   Intimate Partner Violence: Not on file   Housing Stability: Not on file         I have reviewed the past medical, surgical, social and family history, medications and allergies as documented in the EMR  Review of systems: ROS is negative other than that noted in the HPI  Constitutional: Negative for fatigue and fever  HENT: Negative for sore throat  Respiratory: Negative for shortness of breath  Cardiovascular: Negative for chest pain  Gastrointestinal: Negative for abdominal pain  Endocrine: Negative for cold intolerance and heat intolerance  Genitourinary: Negative for flank pain  Musculoskeletal: Negative for back pain  Skin: Negative for rash  Allergic/Immunologic: Negative for immunocompromised state  Neurological: Negative for dizziness  Psychiatric/Behavioral: Negative for agitation  Physical Exam:    Blood pressure 136/93, pulse (!) 112, height 5' 4 5\" (1 638 m), weight 73 5 kg (162 lb)  General/Constitutional: NAD, well developed, well nourished  HENT: Normocephalic, atraumatic  CV: Intact distal pulses, regular rate  Resp: No respiratory distress or labored breathing  Abdomen: soft, nondistended, non tender   Lymphatic: No lymphadenopathy palpated  Neuro: Alert and Oriented x 3, no focal deficits  Psych: Normal mood, normal affect  Skin: Warm, dry, no rashes, no erythema    Right extremity exam     Inspection: Dense of ecchymosis throughout the entire upper extremity starting at the level of the mid biceps down through the entire hand    There are skin " wrinkles  There is some resolving blistering over the distal forearm    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

## 2023-05-31 ENCOUNTER — HOSPITAL ENCOUNTER (OUTPATIENT)
Dept: NON INVASIVE DIAGNOSTICS | Facility: CLINIC | Age: 78
Discharge: HOME | End: 2023-05-31

## 2023-05-31 VITALS
HEART RATE: 90 BPM | DIASTOLIC BLOOD PRESSURE: 93 MMHG | SYSTOLIC BLOOD PRESSURE: 136 MMHG | BODY MASS INDEX: 26.99 KG/M2 | WEIGHT: 162 LBS | HEIGHT: 65 IN

## 2023-05-31 DIAGNOSIS — I48.91 ATRIAL FIBRILLATION, UNSPECIFIED TYPE (HCC): ICD-10-CM

## 2023-05-31 DIAGNOSIS — I10 ESSENTIAL (PRIMARY) HYPERTENSION: ICD-10-CM

## 2023-05-31 DIAGNOSIS — E78.00 PURE HYPERCHOLESTEROLEMIA: ICD-10-CM

## 2023-05-31 DIAGNOSIS — I35.9 AORTIC VALVE DISEASE: ICD-10-CM

## 2023-05-31 LAB
AORTIC ROOT: 3 CM
AORTIC VALVE MEAN VELOCITY: 23 M/S
APICAL FOUR CHAMBER EJECTION FRACTION: 52 %
ASCENDING AORTA: 3.8 CM
AV AREA BY CONTINUOUS VTI: 1.3 CM2
AV AREA PEAK VELOCITY: 1.3 CM2
AV LVOT MEAN GRADIENT: 1 MMHG
AV LVOT PEAK GRADIENT: 2 MMHG
AV MEAN GRADIENT: 23 MMHG
AV PEAK GRADIENT: 37 MMHG
AV VALVE AREA: 1.35 CM2
AV VELOCITY RATIO: 0.22
DOP CALC AO PEAK VEL: 3.06 M/S
DOP CALC AO VTI: 56.94 CM
DOP CALC LVOT AREA: 6.15 CM2
DOP CALC LVOT DIAMETER: 2.8 CM
DOP CALC LVOT PEAK VEL VTI: 12.46 CM
DOP CALC LVOT PEAK VEL: 0.67 M/S
DOP CALC LVOT STROKE INDEX: 41.1 ML/M2
DOP CALC LVOT STROKE VOLUME: 76.68
FRACTIONAL SHORTENING: 31 (ref 28–44)
INTERVENTRICULAR SEPTUM IN DIASTOLE (PARASTERNAL SHORT AXIS VIEW): 1.3 CM
INTERVENTRICULAR SEPTUM: 1.3 CM (ref 0.6–1.1)
LAAS-AP2: 34.3 CM2
LAAS-AP4: 40.3 CM2
LEFT ATRIUM AREA SYSTOLE SINGLE PLANE A4C: 36.5 CM2
LEFT ATRIUM SIZE: 5.1 CM
LEFT INTERNAL DIMENSION IN SYSTOLE: 3.3 CM (ref 2.1–4)
LEFT VENTRICULAR INTERNAL DIMENSION IN DIASTOLE: 4.8 CM (ref 3.5–6)
LEFT VENTRICULAR POSTERIOR WALL IN END DIASTOLE: 1.3 CM
LEFT VENTRICULAR STROKE VOLUME: 63 ML
LVSV (TEICH): 63 ML
MV E'TISSUE VEL-SEP: 10 CM/S
RA PRESSURE ESTIMATED: 3 MMHG
RIGHT ATRIUM AREA SYSTOLE A4C: 33.8 CM2
RIGHT VENTRICLE ID DIMENSION: 4.5 CM
RV PSP: 39 MMHG
SL CV LEFT ATRIUM LENGTH A2C: 7.2 CM
SL CV LV EF: 65
SL CV PED ECHO LEFT VENTRICLE DIASTOLIC VOLUME (MOD BIPLANE) 2D: 109 ML
SL CV PED ECHO LEFT VENTRICLE SYSTOLIC VOLUME (MOD BIPLANE) 2D: 45 ML
TR MAX PG: 36 MMHG
TR PEAK VELOCITY: 3 M/S
TRICUSPID ANNULAR PLANE SYSTOLIC EXCURSION: 1.5 CM
TRICUSPID VALVE PEAK REGURGITATION VELOCITY: 3.01 M/S

## 2023-06-23 DIAGNOSIS — E78.5 HYPERLIPIDEMIA, UNSPECIFIED HYPERLIPIDEMIA TYPE: ICD-10-CM

## 2023-06-23 RX ORDER — ATORVASTATIN CALCIUM 20 MG/1
TABLET, FILM COATED ORAL
Qty: 90 TABLET | Refills: 1 | Status: SHIPPED | OUTPATIENT
Start: 2023-06-23

## 2023-06-23 NOTE — TELEPHONE ENCOUNTER
Requested medication(s) are due for refill today: Yes  Patient has already received a courtesy refill: No  Other reason request has been forwarded to provider: GALL
No

## 2023-07-01 DIAGNOSIS — I10 ESSENTIAL HYPERTENSION, MALIGNANT: ICD-10-CM

## 2023-07-03 RX ORDER — AMLODIPINE BESYLATE 10 MG/1
TABLET ORAL
Qty: 180 TABLET | Refills: 11 | Status: SHIPPED | OUTPATIENT
Start: 2023-07-03

## 2023-09-21 DIAGNOSIS — I10 ESSENTIAL (PRIMARY) HYPERTENSION: ICD-10-CM

## 2023-09-21 RX ORDER — ATENOLOL 25 MG/1
TABLET ORAL
Qty: 270 TABLET | Refills: 2 | Status: SHIPPED | OUTPATIENT
Start: 2023-09-21

## 2023-11-11 NOTE — PROGRESS NOTES
Cardiology Follow Up    [facility]  1945  [id number]  [facility] [id number] [phone]    1. Essential (primary) hypertension  Echo complete w/ contrast if indicated    POCT ECG      2. Hyperlipidemia, unspecified hyperlipidemia type  Echo complete w/ contrast if indicated    POCT ECG      3. Atrial fibrillation, unspecified type ([address])  Echo complete w/ contrast if indicated    POCT ECG      4. Aortic valve disease  Echo complete w/ contrast if indicated          Interval History: Patient is here for a follow-up visit. Patient has Afib on rate control and Pradaxa. He has HTN, HLD and bicuspid AVD with  AS. He is being followed by Nephrology. He is felt to have CRI in reference to long-term hypertension. A lipid profile done 4/2022 demonstrated total cholesterol of 142 with an HDL of 76 and a calculated LDL of 52. Echo done 5/2023 demonstrated preserved LV systolic function with LVEF of 65%. There was LVH. A bicuspid AV is noted with moderate AS and a mean gradient of 23 mmHg. Peak velocity across the aortic valve was 3.06 m/s. There is mild MR/ TR with a mild elevation in PAP. There was JOSLYN and mild dilatation of the ascending aorta at 3.8 cm. There was no significant change compared to a prior study done May 2022. Patient has had issues with lower extremity edema. He does wear compression stockings. I will cut back on his amlodipine to 5 mg twice a day which I think is contributing. We will monitor his blood pressure. He does occasionally get dyspnea. EKG today demonstrates atrial fibrillation with a controlled ventricular response with poor R wave progression and no significant change compared to a prior tracing done 11/16/2022.       Patient Active Problem List   Diagnosis   • Vitamin D deficiency   • Low bicarbonate   • Stage 4 chronic kidney disease ([address])   • Benign hypertension with CKD (chronic kidney disease) stage III ([facility])   • Bilateral leg edema   • Persistent atrial fibrillation ([facility])   • Secondary hyperparathyroidism of renal origin ([address])   • Other cardiomyopathy ([address])     Past Medical History:   Diagnosis Date   • Atrial fibrillation ([address])    • Hypertension      Social History     Socioeconomic History   • Marital status: Single     Spouse name: Not on file   • Number of children: Not on file   • Years of education: Not on file   • Highest education level: Not on file   Occupational History   • Occupation: Construction   Tobacco Use   • Smoking status: Never   • Smokeless tobacco: Never   Vaping Use   • Vaping Use: Never used   Substance and Sexual Activity   • Alcohol use: Yes     Alcohol/week: 4.0 standard drinks of alcohol     Types: 4 Shots of liquor per week   • Drug use: No   • Sexual activity: Not Currently   Other Topics Concern   • Not on file   Social History Narrative   • Not on file     Social Determinants of Health     Financial Resource Strain: Low Risk  (11/28/2022)    Overall Financial Resource Strain (CARDIA)    • Difficulty of Paying Living Expenses: Not hard at all   Food Insecurity: Not on file   Transportation Needs: No Transportation Needs (11/28/2022)    PRAPARE - Transportation    • Lack of Transportation (Medical): No    • Lack of Transportation (Non-Medical): No   Physical Activity: Not on file   Stress: Not on file   Social Connections: Not on file   Intimate Partner Violence: Not on file   Housing Stability: Not on file      Family History   Problem Relation Age of Onset   • Alzheimer's disease Mother    • Coronary artery disease Father    • Heart defect Father         congenital    • Stroke Father    • Other Father         cerebral hemorrhage      No past surgical history on file.     Current Outpatient Medications:   •  amLODIPine (NORVASC) 10 mg tablet, take 1 tablet by mouth twice a day, Disp: 180 tablet, Rfl: 11  •  Ascorbic Acid (VITAMIN C) 500 MG/5ML LIQD, Take 1 tablet by mouth daily, Disp: , Rfl:   •  atenolol (TENORMIN) 25 mg tablet, take 2 tablets by mouth IN THE MORNING AND 1 TABLET IN THE EVENING, Disp: 270 tablet, Rfl: 2  •  atorvastatin (LIPITOR) 20 mg tablet, take 1 tablet by mouth once daily, Disp: 90 tablet, Rfl: 1  •  Cholecalciferol (VITAMIN D) 2000 units tablet, Take 2,000 Units by mouth daily, Disp: , Rfl:   •  cloNIDine (CATAPRES) 0.1 mg tablet, take 1 tablet by mouth every 12 hours, Disp: 180 tablet, Rfl: 3  •  Pradaxa 150 MG capsu, take 1 capsule by mouth twice a day, Disp: 180 capsule, Rfl: 2  Allergies   Allergen Reactions   • Marijuana (Cannabis Sativa) Swelling       Labs:not applicable  Imaging: No results found. Review of Systems:  Review of Systems   All other systems reviewed and are negative. Physical Exam:  /80 (BP Location: Right arm, Patient Position: Sitting, Cuff Size: Standard)   Pulse 78   Ht 5' 4" (1.626 m)   Wt 72 kg (158 lb 11.2 oz)   SpO2 97%   BMI 27.24 kg/m²   Physical Exam  Vitals reviewed. Constitutional:       Appearance: He is well-developed. HENT:      Head: Normocephalic and atraumatic. Cardiovascular:      Rate and Rhythm: Normal rate. Heart sounds: Murmur heard. Pulmonary:      Effort: Pulmonary effort is normal.      Breath sounds: Normal breath sounds. Musculoskeletal:      Cervical back: Normal range of motion. Right lower leg: Edema present. Left lower leg: Edema present. Skin:     General: Skin is warm and dry. Neurological:      Mental Status: He is alert and oriented to person, place, and time. Discussion/Summary: We will continue his present medical regimen except for decreasing dose of amlodipine to 5 mg twice a day. I think this will help in reference to his edema. We did discuss diuretic therapy which she is a little reluctant to start but will have an open mind if decreasing amlodipine does not help.   We will check follow-up echocardiogram in reference to his aortic valve stenosis. I have asked him to call if there is a problem in the interim otherwise I will see him in 1 year.

## 2023-11-21 ENCOUNTER — OFFICE VISIT (OUTPATIENT)
Dept: CARDIOLOGY CLINIC | Facility: CLINIC | Age: 78
End: 2023-11-21
Payer: COMMERCIAL

## 2023-11-21 VITALS
WEIGHT: 158.7 LBS | BODY MASS INDEX: 27.1 KG/M2 | OXYGEN SATURATION: 97 % | SYSTOLIC BLOOD PRESSURE: 118 MMHG | HEART RATE: 78 BPM | DIASTOLIC BLOOD PRESSURE: 80 MMHG | HEIGHT: 64 IN

## 2023-11-21 DIAGNOSIS — E78.5 HYPERLIPIDEMIA, UNSPECIFIED HYPERLIPIDEMIA TYPE: ICD-10-CM

## 2023-11-21 DIAGNOSIS — I48.91 ATRIAL FIBRILLATION, UNSPECIFIED TYPE (HCC): ICD-10-CM

## 2023-11-21 DIAGNOSIS — I10 ESSENTIAL (PRIMARY) HYPERTENSION: Primary | ICD-10-CM

## 2023-11-21 DIAGNOSIS — I35.9 AORTIC VALVE DISEASE: ICD-10-CM

## 2023-11-21 PROCEDURE — 99214 OFFICE O/P EST MOD 30 MIN: CPT | Performed by: INTERNAL MEDICINE

## 2023-11-21 PROCEDURE — 93000 ELECTROCARDIOGRAM COMPLETE: CPT | Performed by: INTERNAL MEDICINE

## 2023-11-21 NOTE — PATIENT INSTRUCTIONS
I will continue the patient's present medical regimen except for decreasing the dose of amlodipine to 5 mg twice a day. Please be cautious with your salt intact. I have asked the patient to call if there is a problem in the interim otherwise I will see the patient in one years time. Patient is due for an echocardiogram prior to the next visit to assess LV wall thickness and systolic function.

## 2023-12-20 DIAGNOSIS — E78.5 HYPERLIPIDEMIA, UNSPECIFIED HYPERLIPIDEMIA TYPE: ICD-10-CM

## 2023-12-20 DIAGNOSIS — I48.91 ATRIAL FIBRILLATION, UNSPECIFIED TYPE (HCC): ICD-10-CM

## 2023-12-20 RX ORDER — DABIGATRAN ETEXILATE 150 MG/1
150 CAPSULE ORAL 2 TIMES DAILY
Qty: 180 CAPSULE | Refills: 2 | Status: SHIPPED | OUTPATIENT
Start: 2023-12-20

## 2023-12-20 RX ORDER — ATORVASTATIN CALCIUM 20 MG/1
TABLET, FILM COATED ORAL
Qty: 90 TABLET | Refills: 2 | Status: SHIPPED | OUTPATIENT
Start: 2023-12-20

## 2024-02-12 ENCOUNTER — TELEPHONE (OUTPATIENT)
Dept: FAMILY MEDICINE CLINIC | Facility: CLINIC | Age: 79
End: 2024-02-12

## 2024-02-12 DIAGNOSIS — N25.81 SECONDARY HYPERPARATHYROIDISM OF RENAL ORIGIN (HCC): ICD-10-CM

## 2024-02-12 DIAGNOSIS — N18.4 STAGE 4 CHRONIC KIDNEY DISEASE (HCC): ICD-10-CM

## 2024-02-12 DIAGNOSIS — I48.19 PERSISTENT ATRIAL FIBRILLATION (HCC): ICD-10-CM

## 2024-02-12 DIAGNOSIS — E87.8 LOW BICARBONATE: ICD-10-CM

## 2024-02-12 DIAGNOSIS — E55.9 VITAMIN D DEFICIENCY: ICD-10-CM

## 2024-02-12 DIAGNOSIS — Z12.11 SCREENING FOR MALIGNANT NEOPLASM OF COLON: ICD-10-CM

## 2024-02-12 DIAGNOSIS — Z12.5 PROSTATE CANCER SCREENING: Primary | ICD-10-CM

## 2024-02-12 DIAGNOSIS — N18.30 BENIGN HYPERTENSION WITH CKD (CHRONIC KIDNEY DISEASE) STAGE III (HCC): ICD-10-CM

## 2024-02-12 DIAGNOSIS — I12.9 BENIGN HYPERTENSION WITH CKD (CHRONIC KIDNEY DISEASE) STAGE III (HCC): ICD-10-CM

## 2024-02-12 DIAGNOSIS — Z86.39 HISTORY OF HYPERCALCEMIA: ICD-10-CM

## 2024-02-12 NOTE — TELEPHONE ENCOUNTER
Patient walked into office stating his insurance called him to tell him he is overdue for lab tests including his prostate test and colon test to be able to get the incentive they offer. Patient told orders there from 04/2022 but does not look like he got them done. Patient given lab orders, told some the cardiologist usually orders but entered since patient is getting the tests at once. Advised patient to schedule since his last visit was May 2023. Patient scheduled for 02/21/24 and stated will get the lab test this week.

## 2024-02-15 ENCOUNTER — APPOINTMENT (OUTPATIENT)
Dept: LAB | Age: 79
End: 2024-02-15
Payer: COMMERCIAL

## 2024-02-15 DIAGNOSIS — Z12.5 PROSTATE CANCER SCREENING: ICD-10-CM

## 2024-02-15 DIAGNOSIS — E55.9 VITAMIN D DEFICIENCY: ICD-10-CM

## 2024-02-15 DIAGNOSIS — N18.4 STAGE 4 CHRONIC KIDNEY DISEASE (HCC): ICD-10-CM

## 2024-02-15 DIAGNOSIS — I12.9 BENIGN HYPERTENSION WITH CKD (CHRONIC KIDNEY DISEASE) STAGE III (HCC): ICD-10-CM

## 2024-02-15 DIAGNOSIS — N18.30 BENIGN HYPERTENSION WITH CKD (CHRONIC KIDNEY DISEASE) STAGE III (HCC): ICD-10-CM

## 2024-02-15 DIAGNOSIS — Z86.39 HISTORY OF HYPERCALCEMIA: ICD-10-CM

## 2024-02-15 DIAGNOSIS — E87.8 LOW BICARBONATE: ICD-10-CM

## 2024-02-15 DIAGNOSIS — N25.81 SECONDARY HYPERPARATHYROIDISM OF RENAL ORIGIN (HCC): ICD-10-CM

## 2024-02-15 DIAGNOSIS — I48.19 PERSISTENT ATRIAL FIBRILLATION (HCC): ICD-10-CM

## 2024-02-15 LAB
25(OH)D3 SERPL-MCNC: 52.5 NG/ML (ref 30–100)
ALBUMIN SERPL BCP-MCNC: 4.2 G/DL (ref 3.5–5)
ALP SERPL-CCNC: 47 U/L (ref 34–104)
ALT SERPL W P-5'-P-CCNC: 19 U/L (ref 7–52)
ANION GAP SERPL CALCULATED.3IONS-SCNC: 7 MMOL/L
AST SERPL W P-5'-P-CCNC: 20 U/L (ref 13–39)
BILIRUB SERPL-MCNC: 1.05 MG/DL (ref 0.2–1)
BUN SERPL-MCNC: 28 MG/DL (ref 5–25)
CALCIUM SERPL-MCNC: 9.4 MG/DL (ref 8.4–10.2)
CHLORIDE SERPL-SCNC: 108 MMOL/L (ref 96–108)
CHOLEST SERPL-MCNC: 151 MG/DL
CO2 SERPL-SCNC: 21 MMOL/L (ref 21–32)
CREAT SERPL-MCNC: 1.88 MG/DL (ref 0.6–1.3)
CREAT UR-MCNC: 59 MG/DL
ERYTHROCYTE [DISTWIDTH] IN BLOOD BY AUTOMATED COUNT: 12.7 % (ref 11.6–15.1)
GFR SERPL CREATININE-BSD FRML MDRD: 33 ML/MIN/1.73SQ M
GLUCOSE P FAST SERPL-MCNC: 100 MG/DL (ref 65–99)
HCT VFR BLD AUTO: 44.8 % (ref 36.5–49.3)
HDLC SERPL-MCNC: 86 MG/DL
HGB BLD-MCNC: 15.3 G/DL (ref 12–17)
LDLC SERPL CALC-MCNC: 52 MG/DL (ref 0–100)
MCH RBC QN AUTO: 33.5 PG (ref 26.8–34.3)
MCHC RBC AUTO-ENTMCNC: 34.2 G/DL (ref 31.4–37.4)
MCV RBC AUTO: 98 FL (ref 82–98)
MICROALBUMIN UR-MCNC: 58.1 MG/L
MICROALBUMIN/CREAT 24H UR: 98 MG/G CREATININE (ref 0–30)
NONHDLC SERPL-MCNC: 65 MG/DL
PLATELET # BLD AUTO: 260 THOUSANDS/UL (ref 149–390)
PMV BLD AUTO: 9.5 FL (ref 8.9–12.7)
POTASSIUM SERPL-SCNC: 5.7 MMOL/L (ref 3.5–5.3)
PROT SERPL-MCNC: 7.1 G/DL (ref 6.4–8.4)
PSA SERPL-MCNC: 2.2 NG/ML (ref 0–4)
PTH-INTACT SERPL-MCNC: 126.4 PG/ML (ref 12–88)
RBC # BLD AUTO: 4.57 MILLION/UL (ref 3.88–5.62)
SODIUM SERPL-SCNC: 136 MMOL/L (ref 135–147)
TRIGL SERPL-MCNC: 63 MG/DL
WBC # BLD AUTO: 5.21 THOUSAND/UL (ref 4.31–10.16)

## 2024-02-15 PROCEDURE — 82570 ASSAY OF URINE CREATININE: CPT

## 2024-02-15 PROCEDURE — 82043 UR ALBUMIN QUANTITATIVE: CPT

## 2024-02-15 PROCEDURE — 80053 COMPREHEN METABOLIC PANEL: CPT

## 2024-02-15 PROCEDURE — 36415 COLL VENOUS BLD VENIPUNCTURE: CPT

## 2024-02-15 PROCEDURE — 82306 VITAMIN D 25 HYDROXY: CPT

## 2024-02-15 PROCEDURE — 80061 LIPID PANEL: CPT

## 2024-02-15 PROCEDURE — 85027 COMPLETE CBC AUTOMATED: CPT

## 2024-02-15 PROCEDURE — 83970 ASSAY OF PARATHORMONE: CPT

## 2024-02-15 PROCEDURE — G0103 PSA SCREENING: HCPCS

## 2024-02-19 ENCOUNTER — APPOINTMENT (OUTPATIENT)
Dept: LAB | Age: 79
End: 2024-02-19
Payer: COMMERCIAL

## 2024-02-19 DIAGNOSIS — Z12.5 PROSTATE CANCER SCREENING: ICD-10-CM

## 2024-02-19 DIAGNOSIS — I48.19 PERSISTENT ATRIAL FIBRILLATION (HCC): ICD-10-CM

## 2024-02-19 DIAGNOSIS — Z86.39 HISTORY OF HYPERCALCEMIA: ICD-10-CM

## 2024-02-19 DIAGNOSIS — E87.8 LOW BICARBONATE: ICD-10-CM

## 2024-02-19 DIAGNOSIS — I12.9 BENIGN HYPERTENSION WITH CKD (CHRONIC KIDNEY DISEASE) STAGE III (HCC): ICD-10-CM

## 2024-02-19 DIAGNOSIS — N18.4 STAGE 4 CHRONIC KIDNEY DISEASE (HCC): ICD-10-CM

## 2024-02-19 DIAGNOSIS — E55.9 VITAMIN D DEFICIENCY: ICD-10-CM

## 2024-02-19 DIAGNOSIS — N18.30 BENIGN HYPERTENSION WITH CKD (CHRONIC KIDNEY DISEASE) STAGE III (HCC): ICD-10-CM

## 2024-02-19 DIAGNOSIS — N25.81 SECONDARY HYPERPARATHYROIDISM OF RENAL ORIGIN (HCC): ICD-10-CM

## 2024-02-19 LAB — HEMOCCULT STL QL IA: NEGATIVE

## 2024-02-19 PROCEDURE — G0328 FECAL BLOOD SCRN IMMUNOASSAY: HCPCS

## 2024-02-23 ENCOUNTER — OFFICE VISIT (OUTPATIENT)
Dept: FAMILY MEDICINE CLINIC | Facility: CLINIC | Age: 79
End: 2024-02-23
Payer: COMMERCIAL

## 2024-02-23 VITALS
WEIGHT: 162.4 LBS | TEMPERATURE: 97.8 F | RESPIRATION RATE: 18 BRPM | BODY MASS INDEX: 27.72 KG/M2 | OXYGEN SATURATION: 96 % | SYSTOLIC BLOOD PRESSURE: 132 MMHG | HEART RATE: 100 BPM | HEIGHT: 64 IN | DIASTOLIC BLOOD PRESSURE: 82 MMHG

## 2024-02-23 DIAGNOSIS — N18.4 STAGE 4 CHRONIC KIDNEY DISEASE (HCC): ICD-10-CM

## 2024-02-23 DIAGNOSIS — N25.81 SECONDARY HYPERPARATHYROIDISM OF RENAL ORIGIN (HCC): ICD-10-CM

## 2024-02-23 DIAGNOSIS — Z00.00 MEDICARE ANNUAL WELLNESS VISIT, SUBSEQUENT: Primary | ICD-10-CM

## 2024-02-23 DIAGNOSIS — E87.5 HYPERKALEMIA: ICD-10-CM

## 2024-02-23 DIAGNOSIS — I42.8 OTHER CARDIOMYOPATHY (HCC): ICD-10-CM

## 2024-02-23 DIAGNOSIS — I48.19 PERSISTENT ATRIAL FIBRILLATION (HCC): ICD-10-CM

## 2024-02-23 DIAGNOSIS — I87.311 CHRONIC VENOUS HYPERTENSION (IDIOPATHIC) WITH ULCER OF RIGHT LOWER EXTREMITY (HCC): ICD-10-CM

## 2024-02-23 DIAGNOSIS — L97.919 CHRONIC VENOUS HYPERTENSION (IDIOPATHIC) WITH ULCER OF RIGHT LOWER EXTREMITY (HCC): ICD-10-CM

## 2024-02-23 PROCEDURE — G0439 PPPS, SUBSEQ VISIT: HCPCS | Performed by: FAMILY MEDICINE

## 2024-02-23 PROCEDURE — 99213 OFFICE O/P EST LOW 20 MIN: CPT | Performed by: FAMILY MEDICINE

## 2024-02-23 NOTE — PATIENT INSTRUCTIONS
Medicare Preventive Visit Patient Instructions  Thank you for completing your Welcome to Medicare Visit or Medicare Annual Wellness Visit today. Your next wellness visit will be due in one year (2/23/2025).  The screening/preventive services that you may require over the next 5-10 years are detailed below. Some tests may not apply to you based off risk factors and/or age. Screening tests ordered at today's visit but not completed yet may show as past due. Also, please note that scanned in results may not display below.  Preventive Screenings:  Service Recommendations Previous Testing/Comments   Colorectal Cancer Screening  Colonoscopy    Fecal Occult Blood Test (FOBT)/Fecal Immunochemical Test (FIT)  Fecal DNA/Cologuard Test  Flexible Sigmoidoscopy Age: 45-75 years old   Colonoscopy: every 10 years (May be performed more frequently if at higher risk)  OR  FOBT/FIT: every 1 year  OR  Cologuard: every 3 years  OR  Sigmoidoscopy: every 5 years  Screening may be recommended earlier than age 45 if at higher risk for colorectal cancer. Also, an individualized decision between you and your healthcare provider will decide whether screening between the ages of 76-85 would be appropriate. Colonoscopy: Not on file  FOBT/FIT: 02/19/2024  Cologuard: Not on file  Sigmoidoscopy: Not on file    Screening Current     Prostate Cancer Screening Individualized decision between patient and health care provider in men between ages of 55-69   Medicare will cover every 12 months beginning on the day after your 50th birthday PSA: 2.20 ng/mL     Screening Not Indicated     Hepatitis C Screening Once for adults born between 1945 and 1965  More frequently in patients at high risk for Hepatitis C Hep C Antibody: Not on file        Diabetes Screening 1-2 times per year if you're at risk for diabetes or have pre-diabetes Fasting glucose: 100 mg/dL (2/15/2024)  A1C: No results in last 5 years (No results in last 5 years)  Screening Current    Cholesterol Screening Once every 5 years if you don't have a lipid disorder. May order more often based on risk factors. Lipid panel: 02/15/2024  Screening Not Indicated  History Lipid Disorder      Other Preventive Screenings Covered by Medicare:  Abdominal Aortic Aneurysm (AAA) Screening: covered once if your at risk. You're considered to be at risk if you have a family history of AAA or a male between the age of 65-75 who smoking at least 100 cigarettes in your lifetime.  Lung Cancer Screening: covers low dose CT scan once per year if you meet all of the following conditions: (1) Age 55-77; (2) No signs or symptoms of lung cancer; (3) Current smoker or have quit smoking within the last 15 years; (4) You have a tobacco smoking history of at least 20 pack years (packs per day x number of years you smoked); (5) You get a written order from a healthcare provider.  Glaucoma Screening: covered annually if you're considered high risk: (1) You have diabetes OR (2) Family history of glaucoma OR (3)  aged 50 and older OR (4)  American aged 65 and older  Osteoporosis Screening: covered every 2 years if you meet one of the following conditions: (1) Have a vertebral abnormality; (2) On glucocorticoid therapy for more than 3 months; (3) Have primary hyperparathyroidism; (4) On osteoporosis medications and need to assess response to drug therapy.  HIV Screening: covered annually if you're between the age of 15-65. Also covered annually if you are younger than 15 and older than 65 with risk factors for HIV infection. For pregnant patients, it is covered up to 3 times per pregnancy.    Immunizations:  Immunization Recommendations   Influenza Vaccine Annual influenza vaccination during flu season is recommended for all persons aged >= 6 months who do not have contraindications   Pneumococcal Vaccine   * Pneumococcal conjugate vaccine = PCV13 (Prevnar 13), PCV15 (Vaxneuvance), PCV20 (Prevnar 20)  *  Pneumococcal polysaccharide vaccine = PPSV23 (Pneumovax) Adults 19-65 yo with certain risk factors or if 65+ yo  If never received any pneumonia vaccine: recommend Prevnar 20 (PCV20)  Give PCV20 if previously received 1 dose of PCV13 or PPSV23   Hepatitis B Vaccine 3 dose series if at intermediate or high risk (ex: diabetes, end stage renal disease, liver disease)   Respiratory syncytial virus (RSV) Vaccine - COVERED BY MEDICARE PART D  * RSVPreF3 (Arexvy) CDC recommends that adults 60 years of age and older may receive a single dose of RSV vaccine using shared clinical decision-making (SCDM)   Tetanus (Td) Vaccine - COST NOT COVERED BY MEDICARE PART B Following completion of primary series, a booster dose should be given every 10 years to maintain immunity against tetanus. Td may also be given as tetanus wound prophylaxis.   Tdap Vaccine - COST NOT COVERED BY MEDICARE PART B Recommended at least once for all adults. For pregnant patients, recommended with each pregnancy.   Shingles Vaccine (Shingrix) - COST NOT COVERED BY MEDICARE PART B  2 shot series recommended in those 19 years and older who have or will have weakened immune systems or those 50 years and older     Health Maintenance Due:      Topic Date Due   • Hepatitis C Screening  Never done   • Colorectal Cancer Screening  Discontinued     Immunizations Due:      Topic Date Due   • Pneumococcal Vaccine: 65+ Years (2 of 2 - PPSV23 or PCV20) 01/04/2016   • Influenza Vaccine (1) 09/01/2023   • COVID-19 Vaccine (4 - 2023-24 season) 09/01/2023     Advance Directives   What are advance directives?  Advance directives are legal documents that state your wishes and plans for medical care. These plans are made ahead of time in case you lose your ability to make decisions for yourself. Advance directives can apply to any medical decision, such as the treatments you want, and if you want to donate organs.   What are the types of advance directives?  There are many  types of advance directives, and each state has rules about how to use them. You may choose a combination of any of the following:  Living will:  This is a written record of the treatment you want. You can also choose which treatments you do not want, which to limit, and which to stop at a certain time. This includes surgery, medicine, IV fluid, and tube feedings.   Durable power of  for healthcare (DPAHC):  This is a written record that states who you want to make healthcare choices for you when you are unable to make them for yourself. This person, called a proxy, is usually a family member or a friend. You may choose more than 1 proxy.  Do not resuscitate (DNR) order:  A DNR order is used in case your heart stops beating or you stop breathing. It is a request not to have certain forms of treatment, such as CPR. A DNR order may be included in other types of advance directives.  Medical directive:  This covers the care that you want if you are in a coma, near death, or unable to make decisions for yourself. You can list the treatments you want for each condition. Treatment may include pain medicine, surgery, blood transfusions, dialysis, IV or tube feedings, and a ventilator (breathing machine).  Values history:  This document has questions about your views, beliefs, and how you feel and think about life. This information can help others choose the care that you would choose.  Why are advance directives important?  An advance directive helps you control your care. Although spoken wishes may be used, it is better to have your wishes written down. Spoken wishes can be misunderstood, or not followed. Treatments may be given even if you do not want them. An advance directive may make it easier for your family to make difficult choices about your care.   Weight Management   Why it is important to manage your weight:  Being overweight increases your risk of health conditions such as heart disease, high blood  pressure, type 2 diabetes, and certain types of cancer. It can also increase your risk for osteoarthritis, sleep apnea, and other respiratory problems. Aim for a slow, steady weight loss. Even a small amount of weight loss can lower your risk of health problems.  How to lose weight safely:  A safe and healthy way to lose weight is to eat fewer calories and get regular exercise. You can lose up about 1 pound a week by decreasing the number of calories you eat by 500 calories each day.   Healthy meal plan for weight management:  A healthy meal plan includes a variety of foods, contains fewer calories, and helps you stay healthy. A healthy meal plan includes the following:  Eat whole-grain foods more often.  A healthy meal plan should contain fiber. Fiber is the part of grains, fruits, and vegetables that is not broken down by your body. Whole-grain foods are healthy and provide extra fiber in your diet. Some examples of whole-grain foods are whole-wheat breads and pastas, oatmeal, brown rice, and bulgur.  Eat a variety of vegetables every day.  Include dark, leafy greens such as spinach, kale, bao greens, and mustard greens. Eat yellow and orange vegetables such as carrots, sweet potatoes, and winter squash.   Eat a variety of fruits every day.  Choose fresh or canned fruit (canned in its own juice or light syrup) instead of juice. Fruit juice has very little or no fiber.  Eat low-fat dairy foods.  Drink fat-free (skim) milk or 1% milk. Eat fat-free yogurt and low-fat cottage cheese. Try low-fat cheeses such as mozzarella and other reduced-fat cheeses.  Choose meat and other protein foods that are low in fat.  Choose beans or other legumes such as split peas or lentils. Choose fish, skinless poultry (chicken or turkey), or lean cuts of red meat (beef or pork). Before you cook meat or poultry, cut off any visible fat.   Use less fat and oil.  Try baking foods instead of frying them. Add less fat, such as margarine,  "sour cream, regular salad dressing and mayonnaise to foods. Eat fewer high-fat foods. Some examples of high-fat foods include french fries, doughnuts, ice cream, and cakes.  Eat fewer sweets.  Limit foods and drinks that are high in sugar. This includes candy, cookies, regular soda, and sweetened drinks.  Exercise:  Exercise at least 30 minutes per day on most days of the week. Some examples of exercise include walking, biking, dancing, and swimming. You can also fit in more physical activity by taking the stairs instead of the elevator or parking farther away from stores. Ask your healthcare provider about the best exercise plan for you.   Alcohol Use and Your Health    Drinking too much can harm your health.  Excessive alcohol use leads to about 88,000 death in the [place] each year, and shortens the life of those who diet by almost 30 years.  Further, excessive drinking cost the economy $249 billion in 2010.  Most excessive drinkers are not alcohol dependent.    Excessive alcohol use has immediate effects that increase the risk of many harmful health conditions.  These are most often the result of binge drinking.  Over time, excessive alcohol use can lead to the development of chronic diseases and other series health problems.    What is considered a \"drink\"?        Excessive alcohol use includes:  Binge Drinking: For women, 4 or more drinks consumed on one occasion. For men, 5 or more drinks consumed on one occasion.  Heavy Drinking: For women, 8 or more drinks per week. For men, 15 or more drinks per week  Any alcohol used by pregnant women  Any alcohol used by those under the age of 21 years    If you choose to drink, do so in moderation:  Do not drink at all if you are under the age of 21, or if you are or may be pregnant, or have health problems that could be made worse by drinking.  For women, up to 1 drink per day  For men, up to 2 drinks a day    No one should begin drinking or drink more frequently " based on potential health benefits    Short-Term Health Risks:  Injuries: motor vehicle crashes, falls, drownings, burns  Violence: homicide, suicide, sexual assault, intimate partner violence  Alcohol poisoning  Reproductive health: risky sexual behaviors, unintended prengnacy, sexually transmitted diseases, miscarriage, stillbirth, fetal alcohol syndrome    Long-Term Health Risks:  Chronic diseases: high blood pressure, heart disease, stroke, liver disease, digestive problems  Cancers: breast, mouth and throat, liver, colon  Learning and memory problems: dementia, poor school performance  Mental health: depression, anxiety, insomnia  Social problems: lost productivity, family problems, unemployment  Alcohol dependence    For support and more information:  Substance Abuse and Mental Health Services Administration  [address]  [place] [address]  Web Address: http://www.samhsa.gov    Alcoholics Anonymous        Web Address: http://www.aa.org    https://www.cdc.gov/alcohol/fact-sheets/alcohol-use.htm     © Copyright [employer] 2018 Information is for End User's use only and may not be sold, redistributed or otherwise used for commercial purposes. All illustrations and images included in CareNotes® are the copyrighted property of [employer] or [employer]

## 2024-02-23 NOTE — PROGRESS NOTES
Assessment and Plan:     Problem List Items Addressed This Visit       Stage 4 chronic kidney disease (HCC)    Persistent atrial fibrillation (HCC)    Secondary hyperparathyroidism of renal origin (HCC)    Other cardiomyopathy (HCC)    Chronic venous hypertension (idiopathic) with ulcer of right lower extremity (HCC)     Other Visit Diagnoses       Medicare annual wellness visit, subsequent    -  Primary    Hyperkalemia        Relevant Orders    Basic metabolic panel            Depression Screening and Follow-up Plan: Patient was screened for depression during today's encounter. They screened negative with a PHQ-2 score of 0.      Preventive health issues were discussed with patient, and age appropriate screening tests were ordered as noted in patient's After Visit Summary.  Personalized health advice and appropriate referrals for health education or preventive services given if needed, as noted in patient's After Visit Summary.     History of Present Illness:     Patient presents for a Medicare Wellness Visit    Medicare PE.  SH:  Neg tob or social OH.  Adds salt and spices.  Review labs: K+ and PTH elevated, no changes with renal function.    Hypertension    Hyperlipidemia       Patient Care Team:  Gopal Baumann DO as PCP - General (Family Medicine)  David Sue MD     Review of Systems:     Review of Systems   Constitutional: Negative.    HENT: Negative.     Eyes: Negative.    Respiratory: Negative.     Cardiovascular: Negative.    Gastrointestinal: Negative.    Genitourinary: Negative.    Musculoskeletal: Negative.    Skin: Negative.    Neurological: Negative.    Psychiatric/Behavioral: Negative.          Problem List:     Patient Active Problem List   Diagnosis    Vitamin D deficiency    Low bicarbonate    Stage 4 chronic kidney disease (HCC)    Benign hypertension with CKD (chronic kidney disease) stage III (HCC)    Bilateral leg edema    Persistent atrial fibrillation (HCC)    Secondary  hyperparathyroidism of renal origin (HCC)    Other cardiomyopathy (HCC)    Chronic venous hypertension (idiopathic) with ulcer of right lower extremity (HCC)      Past Medical and Surgical History:     Past Medical History:   Diagnosis Date    Atrial fibrillation (HCC)     Hypertension      History reviewed. No pertinent surgical history.   Family History:     Family History   Problem Relation Age of Onset    Alzheimer's disease Mother     Coronary artery disease Father     Heart defect Father         congenital     Stroke Father     Other Father         cerebral hemorrhage       Social History:     Social History     Socioeconomic History    Marital status: Single     Spouse name: None    Number of children: None    Years of education: None    Highest education level: None   Occupational History    Occupation: Construction   Tobacco Use    Smoking status: Never    Smokeless tobacco: Never   Vaping Use    Vaping status: Never Used   Substance and Sexual Activity    Alcohol use: Yes     Alcohol/week: 4.0 standard drinks of alcohol     Types: 4 Shots of liquor per week    Drug use: No    Sexual activity: Not Currently   Other Topics Concern    None   Social History Narrative    None     Social Determinants of Health     Financial Resource Strain: Low Risk  (2/23/2024)    Overall Financial Resource Strain (CARDIA)     Difficulty of Paying Living Expenses: Not hard at all   Food Insecurity: Not on file   Transportation Needs: No Transportation Needs (2/23/2024)    PRAPARE - Transportation     Lack of Transportation (Medical): No     Lack of Transportation (Non-Medical): No   Physical Activity: Not on file   Stress: Not on file   Social Connections: Not on file   Intimate Partner Violence: Not on file   Housing Stability: Not on file      Medications and Allergies:     Current Outpatient Medications   Medication Sig Dispense Refill    amLODIPine (NORVASC) 10 mg tablet take 1 tablet by mouth twice a day 180 tablet 11     Ascorbic Acid (VITAMIN C) 500 MG/5ML LIQD Take 1 tablet by mouth daily      atenolol (TENORMIN) 25 mg tablet take 2 tablets by mouth IN THE MORNING AND 1 TABLET IN THE EVENING 270 tablet 2    atorvastatin (LIPITOR) 20 mg tablet take 1 tablet by mouth once daily 90 tablet 2    Cholecalciferol (VITAMIN D) 2000 units tablet Take 2,000 Units by mouth daily      cloNIDine (CATAPRES) 0.1 mg tablet take 1 tablet by mouth every 12 hours 180 tablet 3    dabigatran etexilate (PRADAXA) 150 mg capsu take 1 capsule by mouth twice a day 180 capsule 2     No current facility-administered medications for this visit.     Allergies   Allergen Reactions    Marijuana (Cannabis Sativa) Swelling      Immunizations:     Immunization History   Administered Date(s) Administered    COVID-19 J&J ([employer]) vaccine 0.5 mL 03/17/2021    COVID-19 PFIZER VACCINE 0.3 ML IM 12/01/2021    COVID-19 Pfizer Vac BIVALENT Remy-sucrose 12 Yr+ IM 12/01/2022    INFLUENZA 12/19/2022    Pneumococcal Conjugate 13-Valent 11/09/2015    TD (adult) Preservative Free 08/29/2022    Td (adult), adsorbed 08/29/2022    Zoster 04/06/2015      Health Maintenance:         Topic Date Due    Hepatitis C Screening  Never done    Colorectal Cancer Screening  Discontinued         Topic Date Due    Pneumococcal Vaccine: 65+ Years (2 of 2 - PPSV23 or PCV20) 01/04/2016    Influenza Vaccine (1) 09/01/2023    COVID-19 Vaccine (4 - 2023-24 season) 09/01/2023      Medicare Screening Tests and Risk Assessments:     Jamil is here for his Subsequent Wellness visit.     Health Risk Assessment:   Patient rates overall health as good. Patient feels that their physical health rating is same. Patient is satisfied with their life. Eyesight was rated as same. Hearing was rated as same. Patient feels that their emotional and mental health rating is same. Patients states they are never, rarely angry. Patient states they are never, rarely unusually tired/fatigued. Pain experienced in the last 7  days has been none. Patient states that he has experienced no weight loss or gain in last 6 months.     Depression Screening:   PHQ-2 Score: 0      Fall Risk Screening:   In the past year, patient has experienced: no history of falling in past year      Home Safety:  Patient has trouble with stairs inside or outside of their home. Patient has working smoke alarms and has working carbon monoxide detector. Home safety hazards include: none.     Nutrition:   Current diet is Regular.     Medications:   Patient is currently taking over-the-counter supplements. OTC medications include: see medication list. Patient is able to manage medications.     Activities of Daily Living (ADLs)/Instrumental Activities of Daily Living (IADLs):   Walk and transfer into and out of bed and chair?: Yes  Dress and groom yourself?: Yes    Bathe or shower yourself?: Yes    Feed yourself? Yes  Do your laundry/housekeeping?: Yes  Manage your money, pay your bills and track your expenses?: Yes  Make your own meals?: Yes    Do your own shopping?: Yes    Previous Hospitalizations:   Any hospitalizations or ED visits within the last 12 months?: No      Cognitive Screening:   Provider or family/friend/caregiver concerned regarding cognition?: No    PREVENTIVE SCREENINGS      Cardiovascular Screening:    General: Screening Not Indicated, History Lipid Disorder and Screening Current      Diabetes Screening:     General: Screening Current      Colorectal Cancer Screening:     General: Screening Current      Prostate Cancer Screening:    General: Screening Not Indicated      Osteoporosis Screening:    General: Screening Not Indicated      Abdominal Aortic Aneurysm (AAA) Screening:        General: Screening Not Indicated      Lung Cancer Screening:     General: Screening Not Indicated      Hepatitis C Screening:    General: Screening Not Indicated    Screening, Brief Intervention, and Referral to Treatment (SBIRT)    Screening  Typical number of drinks  in a day: 1  Typical number of drinks in a week: 4  Interpretation: Low risk drinking behavior.    AUDIT-C Screenin) How often did you have a drink containing alcohol in the past year? 4 or more times a week  2) How many drinks did you have on a typical day when you were drinking in the past year? 1 to 2  3) How often did you have 6 or more drinks on one occasion in the past year? weekly    AUDIT-C Score: 7  Interpretation: Score 4-12 (male): POSITIVE screen for alcohol misuse    AUDIT Screenin) How often during the last year have you found that you were not able to stop drinking once you had started? 0 - never  5) How often during the last year have you failed to do what was normally expected from you because of drinking? 0 - never  6) How often during the last year have you needed a first drink in the morning to get yourself going after a heavy drinking session? 0 - never  7) How often during the last year have you had a feeling of guilt or remorse after drinking? 0 - never  8) How often during the last year have you been unable to remember what happened the night before because you had been drinking? 0 - never  9) Have you or someone else been injured as a result of your drinking? 0 - no  10) Has a relative or friend or a doctor or another health worker been concerned about your drinking or suggested you cut down? 0 - no    AUDIT Score: 7  Interpretation: Low risk alcohol consumption    Single Item Drug Screening:  How often have you used an illegal drug (including marijuana) or a prescription medication for non-medical reasons in the past year? never    Single Item Drug Screen Score: 0  Interpretation: Negative screen for possible drug use disorder    Other Counseling Topics:   Car/seat belt/driving safety, skin self-exam, sunscreen and calcium and vitamin D intake and regular weightbearing exercise.     No results found.     Physical Exam:     /82 (BP Location: Left arm, Patient Position:  "Sitting, Cuff Size: Large)   Pulse 100   Temp 97.8 °F (36.6 °C) (Oral)   Resp 18   Ht 5' 4\" (1.626 m)   Wt 73.7 kg (162 lb 6.4 oz)   SpO2 96%   BMI 27.88 kg/m²     Physical Exam  Vitals and nursing note reviewed.   Constitutional:       General: He is not in acute distress.     Appearance: He is well-developed.   HENT:      Head: Normocephalic and atraumatic.      Right Ear: External ear normal.      Left Ear: External ear normal.      Nose: Nose normal.      Mouth/Throat:      Mouth: Mucous membranes are moist.      Pharynx: Oropharynx is clear.   Eyes:      Conjunctiva/sclera: Conjunctivae normal.   Cardiovascular:      Rate and Rhythm: Tachycardia present. Rhythm irregular.      Heart sounds: No murmur heard.  Pulmonary:      Effort: Pulmonary effort is normal. No respiratory distress.      Breath sounds: Normal breath sounds.   Abdominal:      General: Abdomen is flat. Bowel sounds are normal.      Palpations: Abdomen is soft.      Tenderness: There is no abdominal tenderness.   Musculoskeletal:         General: No swelling.      Cervical back: Neck supple.   Skin:     General: Skin is warm and dry.      Capillary Refill: Capillary refill takes less than 2 seconds.   Neurological:      Mental Status: He is alert and oriented to person, place, and time.   Psychiatric:         Mood and Affect: Mood normal.         Behavior: Behavior normal.         Thought Content: Thought content normal.         Judgment: Judgment normal.          Gopal Baumann DO  "

## 2024-03-05 ENCOUNTER — APPOINTMENT (OUTPATIENT)
Dept: LAB | Age: 79
End: 2024-03-05
Payer: COMMERCIAL

## 2024-03-05 DIAGNOSIS — E87.5 HYPERKALEMIA: ICD-10-CM

## 2024-03-05 LAB
ANION GAP SERPL CALCULATED.3IONS-SCNC: 8 MMOL/L
BUN SERPL-MCNC: 26 MG/DL (ref 5–25)
CALCIUM SERPL-MCNC: 9.5 MG/DL (ref 8.4–10.2)
CHLORIDE SERPL-SCNC: 112 MMOL/L (ref 96–108)
CO2 SERPL-SCNC: 23 MMOL/L (ref 21–32)
CREAT SERPL-MCNC: 1.87 MG/DL (ref 0.6–1.3)
GFR SERPL CREATININE-BSD FRML MDRD: 34 ML/MIN/1.73SQ M
GLUCOSE SERPL-MCNC: 76 MG/DL (ref 65–140)
POTASSIUM SERPL-SCNC: 4.8 MMOL/L (ref 3.5–5.3)
SODIUM SERPL-SCNC: 143 MMOL/L (ref 135–147)

## 2024-03-05 PROCEDURE — 36415 COLL VENOUS BLD VENIPUNCTURE: CPT

## 2024-03-05 PROCEDURE — 80048 BASIC METABOLIC PNL TOTAL CA: CPT

## 2024-03-13 DIAGNOSIS — I10 ESSENTIAL (PRIMARY) HYPERTENSION: ICD-10-CM

## 2024-03-13 RX ORDER — CLONIDINE HYDROCHLORIDE 0.1 MG/1
TABLET ORAL
Qty: 180 TABLET | Refills: 3 | Status: SHIPPED | OUTPATIENT
Start: 2024-03-13

## 2024-06-03 ENCOUNTER — HOSPITAL ENCOUNTER (OUTPATIENT)
Dept: NON INVASIVE DIAGNOSTICS | Facility: CLINIC | Age: 79
Discharge: HOME | End: 2024-06-03
Payer: COMMERCIAL

## 2024-06-03 VITALS
BODY MASS INDEX: 27.74 KG/M2 | HEART RATE: 99 BPM | DIASTOLIC BLOOD PRESSURE: 82 MMHG | WEIGHT: 162.48 LBS | SYSTOLIC BLOOD PRESSURE: 132 MMHG | HEIGHT: 64 IN

## 2024-06-03 DIAGNOSIS — I10 ESSENTIAL (PRIMARY) HYPERTENSION: ICD-10-CM

## 2024-06-03 DIAGNOSIS — I35.9 AORTIC VALVE DISEASE: ICD-10-CM

## 2024-06-03 DIAGNOSIS — I48.91 ATRIAL FIBRILLATION, UNSPECIFIED TYPE (HCC): ICD-10-CM

## 2024-06-03 DIAGNOSIS — E78.5 HYPERLIPIDEMIA, UNSPECIFIED HYPERLIPIDEMIA TYPE: ICD-10-CM

## 2024-06-03 LAB
AORTIC ROOT: 4.3 CM
AORTIC VALVE MEAN VELOCITY: 22.2 M/S
APICAL FOUR CHAMBER EJECTION FRACTION: 51 %
ASCENDING AORTA: 4 CM
AV AREA BY CONTINUOUS VTI: 1.2 CM2
AV AREA PEAK VELOCITY: 1.4 CM2
AV LVOT MEAN GRADIENT: 1 MMHG
AV LVOT PEAK GRADIENT: 1.7 MMHG
AV MEAN GRADIENT: 21 MMHG
AV PEAK GRADIENT: 31 MMHG
AV VALVE AREA: 1.16 CM2
AV VELOCITY RATIO: 0.25
BSA FOR ECHO PROCEDURE: 1.79 M2
DOP CALC AO PEAK VEL: 2.8 M/S
DOP CALC AO VTI: 56.9 CM
DOP CALC LVOT AREA: 5.31 CM2
DOP CALC LVOT CARDIAC INDEX: 3.22 L/MIN/M2
DOP CALC LVOT CARDIAC OUTPUT: 5.77 L/MIN
DOP CALC LVOT DIAMETER: 2.6 CM
DOP CALC LVOT PEAK VEL VTI: 12.4 CM
DOP CALC LVOT PEAK VEL: 0.7 M/S
DOP CALC LVOT STROKE INDEX: 38.5 ML/M2
DOP CALC LVOT STROKE VOLUME: 65.8 CM3
FRACTIONAL SHORTENING: 45 % (ref 28–44)
INTERVENTRICULAR SEPTUM IN DIASTOLE (PARASTERNAL SHORT AXIS VIEW): 1.4 CM
INTERVENTRICULAR SEPTUM: 1.9 CM (ref 0.6–1.1)
IVC: 15 MM
LAAS-AP2: 34.7 CM2
LAAS-AP4: 30.1 CM2
LEFT ATRIUM SIZE: 4.8 CM
LEFT ATRIUM VOLUME (MOD BIPLANE): 120 ML
LEFT ATRIUM VOLUME INDEX (MOD BIPLANE): 67 ML/M2
LEFT INTERNAL DIMENSION IN SYSTOLE: 3 CM (ref 2.1–4)
LEFT VENTRICULAR INTERNAL DIMENSION IN DIASTOLE: 5.5 CM (ref 3.5–6)
LEFT VENTRICULAR POSTERIOR WALL IN END DIASTOLE: 1.3 CM
LEFT VENTRICULAR STROKE VOLUME: 55 ML
LVSV (TEICH): 55 ML
RA PRESSURE ESTIMATED: 3 MMHG
RIGHT ATRIAL 2D VOLUME: 150 ML
RIGHT ATRIUM AREA SYSTOLE A4C: 35 CM2
RIGHT VENTRICLE ID DIMENSION: 4.4 CM
RV PSP: 52 MMHG
SL CV LEFT ATRIUM LENGTH A2C: 6.6 CM
SL CV LV EF: 55
SL CV PED ECHO LEFT VENTRICLE DIASTOLIC VOLUME (MOD BIPLANE) 2D: 90 ML
SL CV PED ECHO LEFT VENTRICLE SYSTOLIC VOLUME (MOD BIPLANE) 2D: 34 ML
TR MAX PG: 49 MMHG
TR PEAK VELOCITY: 3.5 M/S
TRICUSPID VALVE PEAK REGURGITATION VELOCITY: 3.51 M/S

## 2024-06-03 PROCEDURE — 93306 TTE W/DOPPLER COMPLETE: CPT

## 2024-06-03 PROCEDURE — 93306 TTE W/DOPPLER COMPLETE: CPT | Performed by: INTERNAL MEDICINE

## 2024-06-18 DIAGNOSIS — I10 ESSENTIAL (PRIMARY) HYPERTENSION: ICD-10-CM

## 2024-06-19 RX ORDER — ATENOLOL 25 MG/1
TABLET ORAL
Qty: 270 TABLET | Refills: 0 | Status: SHIPPED | OUTPATIENT
Start: 2024-06-19

## 2024-07-10 ENCOUNTER — OFFICE VISIT (OUTPATIENT)
Dept: FAMILY MEDICINE CLINIC | Facility: CLINIC | Age: 79
End: 2024-07-10
Payer: COMMERCIAL

## 2024-07-10 VITALS
BODY MASS INDEX: 26.6 KG/M2 | RESPIRATION RATE: 18 BRPM | OXYGEN SATURATION: 96 % | HEART RATE: 90 BPM | TEMPERATURE: 99 F | DIASTOLIC BLOOD PRESSURE: 76 MMHG | SYSTOLIC BLOOD PRESSURE: 116 MMHG | WEIGHT: 155.8 LBS | HEIGHT: 64 IN

## 2024-07-10 DIAGNOSIS — R25.2 MUSCLE CRAMPS: Primary | ICD-10-CM

## 2024-07-10 PROCEDURE — 99213 OFFICE O/P EST LOW 20 MIN: CPT | Performed by: FAMILY MEDICINE

## 2024-07-10 PROCEDURE — G2211 COMPLEX E/M VISIT ADD ON: HCPCS | Performed by: FAMILY MEDICINE

## 2024-07-10 NOTE — PROGRESS NOTES
Ambulatory Visit  Name: Jamil Martinez      : 1945      MRN: [id number]  Encounter Provider: Gopal Baumann DO  Encounter Date: 7/10/2024   Encounter department: [facility]    Assessment & Plan     Need electrolytes.  Water:Gatorade, 50:50 mix for fluid and electrolyte replacement.  Limit time working outside in the heat/humidity.      1. Muscle cramps       Chief Complaint   Patient presents with    difficulty walking and concentrating    Chills    Wound Check     Right lower extremity        History of Present Illness     Woke last pm with chills.  Monday drank a lot of water and next morning started getting cramps.  Has been working outside a lot past couple weeks.  Sweating and drinking water.  No electrolytes.      Review of Systems  Past Medical History:   Diagnosis Date    Atrial fibrillation (HCC)     Hypertension      No past surgical history on file.  Family History   Problem Relation Age of Onset    Alzheimer's disease Mother     Coronary artery disease Father     Heart defect Father         congenital     Stroke Father     Other Father         cerebral hemorrhage      Social History     Tobacco Use    Smoking status: Never    Smokeless tobacco: Never   Vaping Use    Vaping status: Never Used   Substance and Sexual Activity    Alcohol use: Yes     Alcohol/week: 4.0 standard drinks of alcohol     Types: 4 Shots of liquor per week    Drug use: No    Sexual activity: Not Currently     Current Outpatient Medications on File Prior to Visit   Medication Sig    amLODIPine (NORVASC) 10 mg tablet take 1 tablet by mouth twice a day    Ascorbic Acid (VITAMIN C) 500 MG/5ML LIQD Take 1 tablet by mouth daily    atenolol (TENORMIN) 25 mg tablet take 2 tablets by mouth IN THE MORNING AND 1 TABLET IN THE EVENING    atorvastatin (LIPITOR) 20 mg tablet take 1 tablet by mouth once daily    Cholecalciferol (VITAMIN D) 2000 units tablet Take 2,000 Units by mouth daily    cloNIDine (CATAPRES) 0.1 mg tablet  "take 1 tablet by mouth every 12 hours    dabigatran etexilate (PRADAXA) 150 mg capsu take 1 capsule by mouth twice a day     Allergies   Allergen Reactions    Marijuana (Cannabis Sativa) Swelling     Immunization History   Administered Date(s) Administered    COVID-19 J&J ([employer]) vaccine 0.5 mL 03/17/2021    COVID-19 PFIZER VACCINE 0.3 ML IM 12/01/2021    COVID-19 Pfizer Vac BIVALENT Remy-sucrose 12 Yr+ IM 12/01/2022    INFLUENZA 12/19/2022    Pneumococcal Conjugate 13-Valent 11/09/2015    TD (adult) Preservative Free 08/29/2022    Td (adult), adsorbed 08/29/2022    Zoster 04/06/2015     Objective     /76 (BP Location: Left arm, Patient Position: Sitting, Cuff Size: Standard)   Pulse 90   Temp 99 °F (37.2 °C) (Oral)   Resp 18   Ht 5' 4\" (1.626 m)   Wt 70.7 kg (155 lb 12.8 oz)   SpO2 96%   BMI 26.74 kg/m²     Physical Exam  Constitutional:       Appearance: He is well-developed.   HENT:      Head: Normocephalic and atraumatic.      Right Ear: External ear normal.      Left Ear: External ear normal.      Nose: Nose normal.      Mouth/Throat:      Mouth: Mucous membranes are moist.      Pharynx: Oropharynx is clear.   Eyes:      Conjunctiva/sclera: Conjunctivae normal.      Pupils: Pupils are equal, round, and reactive to light.   Cardiovascular:      Rate and Rhythm: Normal rate. Rhythm irregular.      Heart sounds: Normal heart sounds.   Pulmonary:      Effort: Pulmonary effort is normal.      Breath sounds: Normal breath sounds.   Musculoskeletal:      Cervical back: Normal range of motion and neck supple.   Skin:     General: Skin is warm and dry.   Neurological:      Mental Status: He is alert and oriented to person, place, and time.      Deep Tendon Reflexes: Reflexes are normal and symmetric.   Psychiatric:         Mood and Affect: Mood normal.         Behavior: Behavior normal.         Thought Content: Thought content normal.         Judgment: Judgment normal.         BMI Counseling: Body mass " index is 26.74 kg/m². The BMI is above normal. Nutrition recommendations include reducing portion sizes and consuming healthier snacks.

## 2024-07-27 DIAGNOSIS — I10 ESSENTIAL HYPERTENSION, MALIGNANT: ICD-10-CM

## 2024-07-29 RX ORDER — AMLODIPINE BESYLATE 10 MG/1
TABLET ORAL
Qty: 180 TABLET | Refills: 0 | Status: SHIPPED | OUTPATIENT
Start: 2024-07-29

## 2024-08-21 ENCOUNTER — OFFICE VISIT (OUTPATIENT)
Dept: WOUND CARE | Facility: HOSPITAL | Age: 79
End: 2024-08-21
Payer: COMMERCIAL

## 2024-08-21 VITALS
RESPIRATION RATE: 16 BRPM | TEMPERATURE: 98.7 F | SYSTOLIC BLOOD PRESSURE: 138 MMHG | HEART RATE: 72 BPM | DIASTOLIC BLOOD PRESSURE: 78 MMHG

## 2024-08-21 DIAGNOSIS — L97.919 CHRONIC VENOUS HYPERTENSION (IDIOPATHIC) WITH ULCER OF RIGHT LOWER EXTREMITY (HCC): Primary | ICD-10-CM

## 2024-08-21 DIAGNOSIS — I87.311 CHRONIC VENOUS HYPERTENSION (IDIOPATHIC) WITH ULCER OF RIGHT LOWER EXTREMITY (HCC): Primary | ICD-10-CM

## 2024-08-21 PROCEDURE — 99203 OFFICE O/P NEW LOW 30 MIN: CPT | Performed by: PODIATRIST

## 2024-08-21 PROCEDURE — 99213 OFFICE O/P EST LOW 20 MIN: CPT | Performed by: PODIATRIST

## 2024-08-21 NOTE — PROGRESS NOTES
Patient ID: Jamil Martinez is a 79 y.o. male Date of Birth 1945       Chief Complaint   Patient presents with    No Wound Consult     Closed wound on right lower leg.       Allergies  Marijuana (cannabis sativa)    Diagnosis:  1. Chronic venous hypertension (idiopathic) with ulcer of right lower extremity (HCC)  -     Compression Stocking      Diagnosis ICD-10-CM Associated Orders   1. Chronic venous hypertension (idiopathic) with ulcer of right lower extremity (HCC)  I87.311 Compression Stocking    L97.919            Assessment & Plan:  Chronic venous stasis bilateral lower extremities left greater than right, no wounds present.  Today we discussed the etiology of chronic venous hypertension, chronic edema to lower extremities, need of proper compression, frequent elevation, low-sodium diet..   As patient's wound is healed, no debridement or treatment was performed, I gave him a prescription for 10 to 15 mmHg close toe knee-high compression stockings, he was given the information for [facility]'s pharmacy in [facility] as he lives in [place].  I reviewed patient's medical records, PCP visit notes, cardiology notes, I sent a message to cardiology and PCP to consider putting him on a low-dose diuretic to help with lower extremity edema.  At this time as patient does not have any wounds he does not need to follow-up at wound management, he is advised if he has any new or recurrent wounds to call immediately.  He understands and agrees with the plan.      Procedures - none    Subjective:   Jamil presents today upon referral from his PCP for evaluation and care of left lower extremity venous stasis ulceration, he states he has always had some swelling in his legs, wears compression stockings but does not know how old they are.  He states that his left lower leg started weeping and it was soaking through everything.  Last Tuesday morning he made an appointment to see us and then the spot dried up.        The following  portions of the patient's history were reviewed and updated as appropriate:   Patient Active Problem List   Diagnosis    Vitamin D deficiency    Low bicarbonate    Stage 4 chronic kidney disease (HCC)    Benign hypertension with CKD (chronic kidney disease) stage III (HCC)    Bilateral leg edema    Persistent atrial fibrillation (HCC)    Secondary hyperparathyroidism of renal origin (HCC)    Other cardiomyopathy (HCC)    Chronic venous hypertension (idiopathic) with ulcer of right lower extremity (HCC)     Past Medical History:   Diagnosis Date    Atrial fibrillation (HCC)     Hypertension      No past surgical history on file.  Social History     Socioeconomic History    Marital status: Single     Spouse name: Not on file    Number of children: Not on file    Years of education: Not on file    Highest education level: Not on file   Occupational History    Occupation: Construction   Tobacco Use    Smoking status: Never    Smokeless tobacco: Never   Vaping Use    Vaping status: Never Used   Substance and Sexual Activity    Alcohol use: Yes     Alcohol/week: 4.0 standard drinks of alcohol     Types: 4 Shots of liquor per week    Drug use: No    Sexual activity: Not Currently   Other Topics Concern    Not on file   Social History Narrative    Not on file     Social Determinants of Health     Financial Resource Strain: Low Risk  (2/23/2024)    Overall Financial Resource Strain (CARDIA)     Difficulty of Paying Living Expenses: Not hard at all   Food Insecurity: Not on file   Transportation Needs: No Transportation Needs (2/23/2024)    PRAPARE - Transportation     Lack of Transportation (Medical): No     Lack of Transportation (Non-Medical): No   Physical Activity: Not on file   Stress: Not on file   Social Connections: Unknown (6/18/2024)    Received from [facility]    Social Connections     How often do you feel lonely or isolated from those around you? (Adult - for ages 18 years and over): Not on file   Intimate  Partner Violence: Not on file   Housing Stability: Not on file        Current Outpatient Medications:     amLODIPine (NORVASC) 10 mg tablet, take 1 tablet by mouth twice a day, Disp: 180 tablet, Rfl: 0    Ascorbic Acid (VITAMIN C) 500 MG/5ML LIQD, Take 1 tablet by mouth daily, Disp: , Rfl:     atenolol (TENORMIN) 25 mg tablet, take 2 tablets by mouth IN THE MORNING AND 1 TABLET IN THE EVENING, Disp: 270 tablet, Rfl: 0    atorvastatin (LIPITOR) 20 mg tablet, take 1 tablet by mouth once daily, Disp: 90 tablet, Rfl: 2    Cholecalciferol (VITAMIN D) 2000 units tablet, Take 2,000 Units by mouth daily, Disp: , Rfl:     cloNIDine (CATAPRES) 0.1 mg tablet, take 1 tablet by mouth every 12 hours, Disp: 180 tablet, Rfl: 3    dabigatran etexilate (PRADAXA) 150 mg capsu, take 1 capsule by mouth twice a day, Disp: 180 capsule, Rfl: 2  Family History   Problem Relation Age of Onset    Alzheimer's disease Mother     Coronary artery disease Father     Heart defect Father         congenital     Stroke Father     Other Father         cerebral hemorrhage        Review of Systems   Constitutional:  Negative for chills and fever.   HENT:  Negative for ear pain and sore throat.    Eyes:  Negative for pain and visual disturbance.   Respiratory:  Negative for cough and shortness of breath.    Cardiovascular:  Negative for chest pain and palpitations.   Gastrointestinal:  Negative for abdominal pain and vomiting.   Genitourinary:  Negative for dysuria and hematuria.   Musculoskeletal:  Negative for arthralgias and back pain.   Skin:  Positive for color change and wound. Negative for rash.   Neurological:  Negative for seizures and syncope.   Psychiatric/Behavioral: Negative.     All other systems reviewed and are negative.      Objective:  /78   Pulse 72   Temp 98.7 °F (37.1 °C)   Resp 16     Physical Exam  Constitutional:       Appearance: Normal appearance. He is normal weight.   HENT:      Head: Normocephalic and atraumatic.       "Right Ear: External ear normal.      Left Ear: External ear normal.      Nose: Nose normal.      Mouth/Throat:      Mouth: Mucous membranes are moist.      Pharynx: Oropharynx is clear.   Eyes:      Conjunctiva/sclera: Conjunctivae normal.      Pupils: Pupils are equal, round, and reactive to light.   Cardiovascular:      Pulses: Normal pulses.           Dorsalis pedis pulses are 2+ on the right side and 2+ on the left side.        Posterior tibial pulses are 2+ on the right side and 2+ on the left side.   Pulmonary:      Effort: Pulmonary effort is normal.   Musculoskeletal:      Cervical back: Normal range of motion.      Right lower le+ Edema present.      Left lower le+ Edema present.   Skin:     General: Skin is warm and dry.      Capillary Refill: Capillary refill takes less than 2 seconds.   Neurological:      General: No focal deficit present.      Mental Status: He is alert and oriented to person, place, and time. Mental status is at baseline.   Psychiatric:         Mood and Affect: Mood normal.         Behavior: Behavior normal.         Thought Content: Thought content normal.         Judgment: Judgment normal.                            No results found.    Wound Instructions:  Orders Placed This Encounter   Procedures    Compression Stocking     Order Specific Question:   Type     Answer:   Knee     Order Specific Question:   Length     Answer:   Below Knee     Order Specific Question:   mm HG     Answer:   15-20         Lianet Pham, DPM,DPM, FACFAS    Portions of the record may have been created with voice recognition software. Occasional wrong word or \"sound a like\" substitutions may have occurred due to the inherent limitations of voice recognition software. Read the chart carefully and recognize, using context, where substitutions have occurred.      "

## 2024-08-21 NOTE — PATIENT INSTRUCTIONS
Orders Placed This Encounter   Procedures    Wound cleansing and dressings     Wear compression daily     Standing Status:   Future     Standing Expiration Date:   8/28/2024    Compression Stocking     Order Specific Question:   Type     Answer:   Knee     Order Specific Question:   Length     Answer:   Below Knee     Order Specific Question:   mm HG     Answer:   15-20

## 2024-09-17 DIAGNOSIS — I10 ESSENTIAL (PRIMARY) HYPERTENSION: ICD-10-CM

## 2024-09-17 DIAGNOSIS — I48.91 ATRIAL FIBRILLATION, UNSPECIFIED TYPE (HCC): ICD-10-CM

## 2024-09-17 DIAGNOSIS — E78.5 HYPERLIPIDEMIA, UNSPECIFIED HYPERLIPIDEMIA TYPE: ICD-10-CM

## 2024-09-18 RX ORDER — DABIGATRAN ETEXILATE 150 MG/1
150 CAPSULE ORAL 2 TIMES DAILY
Qty: 180 CAPSULE | Refills: 1 | Status: SHIPPED | OUTPATIENT
Start: 2024-09-18

## 2024-09-18 RX ORDER — ATENOLOL 25 MG/1
TABLET ORAL
Qty: 270 TABLET | Refills: 1 | Status: SHIPPED | OUTPATIENT
Start: 2024-09-18

## 2024-09-18 RX ORDER — ATORVASTATIN CALCIUM 20 MG/1
TABLET, FILM COATED ORAL
Qty: 90 TABLET | Refills: 1 | Status: SHIPPED | OUTPATIENT
Start: 2024-09-18

## 2024-11-04 DIAGNOSIS — I10 ESSENTIAL HYPERTENSION, MALIGNANT: ICD-10-CM

## 2024-11-05 RX ORDER — AMLODIPINE BESYLATE 10 MG/1
TABLET ORAL
Qty: 180 TABLET | Refills: 1 | Status: SHIPPED | OUTPATIENT
Start: 2024-11-05

## 2024-11-11 ENCOUNTER — RA CDI HCC (OUTPATIENT)
Dept: OTHER | Facility: HOSPITAL | Age: 79
End: 2024-11-11

## 2024-11-13 ENCOUNTER — TELEPHONE (OUTPATIENT)
Dept: ADMINISTRATIVE | Facility: OTHER | Age: 79
End: 2024-11-13

## 2024-11-13 NOTE — TELEPHONE ENCOUNTER
11/13/24 2:28 PM    Patient contacted to bring Advance Directive, POLST, or Living Will document to next scheduled pcp visit.[facility] Department left message.    Thank you.  Tere Fry MA  [facility]

## 2024-11-20 ENCOUNTER — HOSPITAL ENCOUNTER (EMERGENCY)
Facility: HOSPITAL | Age: 79
Discharge: HOME | End: 2024-11-20
Attending: EMERGENCY MEDICINE
Payer: COMMERCIAL

## 2024-11-20 ENCOUNTER — OFFICE VISIT (OUTPATIENT)
Dept: FAMILY MEDICINE CLINIC | Facility: CLINIC | Age: 79
End: 2024-11-20
Payer: COMMERCIAL

## 2024-11-20 ENCOUNTER — APPOINTMENT (EMERGENCY)
Dept: RADIOLOGY | Facility: HOSPITAL | Age: 79
End: 2024-11-20
Payer: COMMERCIAL

## 2024-11-20 VITALS
DIASTOLIC BLOOD PRESSURE: 88 MMHG | HEART RATE: 98 BPM | BODY MASS INDEX: 27.04 KG/M2 | SYSTOLIC BLOOD PRESSURE: 114 MMHG | TEMPERATURE: 97.5 F | RESPIRATION RATE: 18 BRPM | WEIGHT: 158.4 LBS | OXYGEN SATURATION: 97 % | HEIGHT: 64 IN

## 2024-11-20 VITALS
SYSTOLIC BLOOD PRESSURE: 140 MMHG | OXYGEN SATURATION: 96 % | HEART RATE: 87 BPM | DIASTOLIC BLOOD PRESSURE: 85 MMHG | TEMPERATURE: 97.2 F | RESPIRATION RATE: 18 BRPM

## 2024-11-20 DIAGNOSIS — I12.9 BENIGN HYPERTENSION WITH CKD (CHRONIC KIDNEY DISEASE) STAGE III (HCC): ICD-10-CM

## 2024-11-20 DIAGNOSIS — R06.02 SHORT OF BREATH ON EXERTION: Primary | ICD-10-CM

## 2024-11-20 DIAGNOSIS — I48.19 PERSISTENT ATRIAL FIBRILLATION (HCC): ICD-10-CM

## 2024-11-20 DIAGNOSIS — Z86.79 HX OF AORTIC VALVE STENOSIS: ICD-10-CM

## 2024-11-20 DIAGNOSIS — N25.81 SECONDARY HYPERPARATHYROIDISM OF RENAL ORIGIN (HCC): ICD-10-CM

## 2024-11-20 DIAGNOSIS — R94.31 ABNORMAL EKG: ICD-10-CM

## 2024-11-20 DIAGNOSIS — R06.02 SHORTNESS OF BREATH AT REST: ICD-10-CM

## 2024-11-20 DIAGNOSIS — R06.09 DYSPNEA ON EXERTION: Primary | ICD-10-CM

## 2024-11-20 DIAGNOSIS — R60.0 BILATERAL LEG EDEMA: ICD-10-CM

## 2024-11-20 DIAGNOSIS — R29.898 WEAKNESS OF BOTH LOWER EXTREMITIES: ICD-10-CM

## 2024-11-20 DIAGNOSIS — N18.30 BENIGN HYPERTENSION WITH CKD (CHRONIC KIDNEY DISEASE) STAGE III (HCC): ICD-10-CM

## 2024-11-20 DIAGNOSIS — R79.89 ELEVATED BRAIN NATRIURETIC PEPTIDE (BNP) LEVEL: ICD-10-CM

## 2024-11-20 LAB
ALBUMIN SERPL BCG-MCNC: 4 G/DL (ref 3.5–5)
ALP SERPL-CCNC: 49 U/L (ref 34–104)
ALT SERPL W P-5'-P-CCNC: 14 U/L (ref 7–52)
ANION GAP SERPL CALCULATED.3IONS-SCNC: 6 MMOL/L (ref 4–13)
AST SERPL W P-5'-P-CCNC: 17 U/L (ref 13–39)
BASOPHILS # BLD AUTO: 0.03 THOUSANDS/ÂΜL (ref 0–0.1)
BASOPHILS NFR BLD AUTO: 1 % (ref 0–1)
BILIRUB SERPL-MCNC: 0.77 MG/DL (ref 0.2–1)
BNP SERPL-MCNC: 870 PG/ML (ref 0–100)
BUN SERPL-MCNC: 35 MG/DL (ref 5–25)
CALCIUM SERPL-MCNC: 9.5 MG/DL (ref 8.4–10.2)
CARDIAC TROPONIN I PNL SERPL HS: 9 NG/L (ref ?–50)
CHLORIDE SERPL-SCNC: 109 MMOL/L (ref 96–108)
CO2 SERPL-SCNC: 23 MMOL/L (ref 21–32)
CREAT SERPL-MCNC: 1.94 MG/DL (ref 0.6–1.3)
EOSINOPHIL # BLD AUTO: 0.16 THOUSAND/ÂΜL (ref 0–0.61)
EOSINOPHIL NFR BLD AUTO: 4 % (ref 0–6)
ERYTHROCYTE [DISTWIDTH] IN BLOOD BY AUTOMATED COUNT: 12.8 % (ref 11.6–15.1)
GFR SERPL CREATININE-BSD FRML MDRD: 32 ML/MIN/1.73SQ M
GLUCOSE SERPL-MCNC: 95 MG/DL (ref 65–140)
HCT VFR BLD AUTO: 41.3 % (ref 36.5–49.3)
HGB BLD-MCNC: 14.2 G/DL (ref 12–17)
IMM GRANULOCYTES # BLD AUTO: 0.01 THOUSAND/UL (ref 0–0.2)
IMM GRANULOCYTES NFR BLD AUTO: 0 % (ref 0–2)
LYMPHOCYTES # BLD AUTO: 0.6 THOUSANDS/ÂΜL (ref 0.6–4.47)
LYMPHOCYTES NFR BLD AUTO: 13 % (ref 14–44)
MCH RBC QN AUTO: 34.4 PG (ref 26.8–34.3)
MCHC RBC AUTO-ENTMCNC: 34.4 G/DL (ref 31.4–37.4)
MCV RBC AUTO: 100 FL (ref 82–98)
MONOCYTES # BLD AUTO: 0.41 THOUSAND/ÂΜL (ref 0.17–1.22)
MONOCYTES NFR BLD AUTO: 9 % (ref 4–12)
NEUTROPHILS # BLD AUTO: 3.27 THOUSANDS/ÂΜL (ref 1.85–7.62)
NEUTS SEG NFR BLD AUTO: 73 % (ref 43–75)
NRBC BLD AUTO-RTO: 0 /100 WBCS
PLATELET # BLD AUTO: 204 THOUSANDS/UL (ref 149–390)
PMV BLD AUTO: 8.9 FL (ref 8.9–12.7)
POTASSIUM SERPL-SCNC: 4.7 MMOL/L (ref 3.5–5.3)
PROT SERPL-MCNC: 7.1 G/DL (ref 6.4–8.4)
QRS AXIS: 169 DEGREES
QRSD INTERVAL: 94 MS
QT INTERVAL: 382 MS
QTC INTERVAL: 449 MS
RBC # BLD AUTO: 4.13 MILLION/UL (ref 3.88–5.62)
SODIUM SERPL-SCNC: 138 MMOL/L (ref 135–147)
T WAVE AXIS: -56 DEGREES
VENTRICULAR RATE: 83 BPM
WBC # BLD AUTO: 4.48 THOUSAND/UL (ref 4.31–10.16)

## 2024-11-20 PROCEDURE — 93000 ELECTROCARDIOGRAM COMPLETE: CPT | Performed by: FAMILY MEDICINE

## 2024-11-20 PROCEDURE — 71045 X-RAY EXAM CHEST 1 VIEW: CPT

## 2024-11-20 PROCEDURE — 36415 COLL VENOUS BLD VENIPUNCTURE: CPT

## 2024-11-20 PROCEDURE — 99285 EMERGENCY DEPT VISIT HI MDM: CPT | Performed by: EMERGENCY MEDICINE

## 2024-11-20 PROCEDURE — 84484 ASSAY OF TROPONIN QUANT: CPT | Performed by: EMERGENCY MEDICINE

## 2024-11-20 PROCEDURE — 93005 ELECTROCARDIOGRAM TRACING: CPT

## 2024-11-20 PROCEDURE — 93010 ELECTROCARDIOGRAM REPORT: CPT | Performed by: INTERNAL MEDICINE

## 2024-11-20 PROCEDURE — 83880 ASSAY OF NATRIURETIC PEPTIDE: CPT

## 2024-11-20 PROCEDURE — 80053 COMPREHEN METABOLIC PANEL: CPT | Performed by: EMERGENCY MEDICINE

## 2024-11-20 PROCEDURE — 85025 COMPLETE CBC W/AUTO DIFF WBC: CPT | Performed by: EMERGENCY MEDICINE

## 2024-11-20 PROCEDURE — 99285 EMERGENCY DEPT VISIT HI MDM: CPT

## 2024-11-20 PROCEDURE — 99215 OFFICE O/P EST HI 40 MIN: CPT | Performed by: FAMILY MEDICINE

## 2024-11-20 NOTE — ED ATTENDING ATTESTATION
11/20/2024  I, Aroldo Briggs DO, saw and evaluated the patient. I have discussed the patient with the resident/non-physician practitioner and agree with the resident's/non-physician practitioner's findings, Plan of Care, and MDM as documented in the resident's/non-physician practitioner's note, except where noted. All available labs and Radiology studies were reviewed.  I was present for key portions of any procedure(s) performed by the resident/non-physician practitioner and I was immediately available to provide assistance.       At this point I agree with the current assessment done in the Emergency Department.  I have conducted an independent evaluation of this patient a history and physical is as follows:    Patient is a 79-year-old male with a history of atrial fibrillation on Pradaxa, hypertension, chronic lower extremity edema, bicuspid aortic valve, says over the last for 5 months he has had increased progressive dyspnea on exertion, no chest pain, no orthopnea, no fever, no chills.  No lightheadedness, no syncope or near syncope.  He has been compliant with his Pradaxa.Patient denies any prolonged travel history, no recent long travel, no immobilizations, or hospitalizations.  Patient says he was at his primary care physician's office today for a regular scheduled visit, when he recounted his history of progressive dyspnea on exertion, was recommended he go to the ED.  Patient follows with cardiology, has an appointment in January 2025.    Patient had an echocardiogram Annemarie 3, 2024 which showed normal EF,    General:  Patient is well-appearing  Head:  Atraumatic  Eyes:  Conjunctiva pink  ENT:  Mucous membranes are moist  Neck:  Supple  Cardiac:  S1-S2, 4 out of 6 systolic ejection murmur  Lungs:  Clear to auscultation bilaterally  Abdomen:  Soft, nontender, normal bowel sounds, no CVA tenderness, no tympany, no rigidity, no guarding  Extremities:  Normal range of motion, bilateral pedal edema, no calf  asymmetry, radial pulses are equal and symmetric bilaterally  Neurologic:  Awake, fluent speech, normal comprehension, AAOx3  Skin:  Pink warm and dry  Psychiatric:  Alert, pleasant, cooperative      ED Course  ED Course as of 11/20/24 1147   Wed Nov 20, 2024   1103 ECG interpreted by me, atrial fibrillation, rate of 83,chronic inferior lateral T wave inversions, no change from July 2015     XR chest 1 view portable   ED Interpretation   Trace L pleural effusion. No diffuse pulmonary edema concerning for CHF exacerbation       Final Result      Cardiomegaly with possible small left effusion..            Workstation performed: [id number]           Labs Reviewed   CBC AND DIFFERENTIAL - Abnormal       Result Value Ref Range Status    WBC 4.48  4.31 - 10.16 Thousand/uL Final    RBC 4.13  3.88 - 5.62 Million/uL Final    Hemoglobin 14.2  12.0 - 17.0 g/dL Final    Hematocrit 41.3  36.5 - 49.3 % Final     (*) 82 - 98 fL Final    MCH 34.4 (*) 26.8 - 34.3 pg Final    MCHC 34.4  31.4 - 37.4 g/dL Final    RDW 12.8  11.6 - 15.1 % Final    MPV 8.9  8.9 - 12.7 fL Final    Platelets 204  149 - 390 Thousands/uL Final    nRBC 0  /100 WBCs Final    Segmented % 73  43 - 75 % Final    Immature Grans % 0  0 - 2 % Final    Lymphocytes % 13 (*) 14 - 44 % Final    Monocytes % 9  4 - 12 % Final    Eosinophils Relative 4  0 - 6 % Final    Basophils Relative 1  0 - 1 % Final    Absolute Neutrophils 3.27  1.85 - 7.62 Thousands/µL Final    Absolute Immature Grans 0.01  0.00 - 0.20 Thousand/uL Final    Absolute Lymphocytes 0.60  0.60 - 4.47 Thousands/µL Final    Absolute Monocytes 0.41  0.17 - 1.22 Thousand/µL Final    Eosinophils Absolute 0.16  0.00 - 0.61 Thousand/µL Final    Basophils Absolute 0.03  0.00 - 0.10 Thousands/µL Final   COMPREHENSIVE METABOLIC PANEL - Abnormal    Sodium 138  135 - 147 mmol/L Final    Potassium 4.7  3.5 - 5.3 mmol/L Final    Chloride 109 (*) 96 - 108 mmol/L Final    CO2 23  21 - 32 mmol/L Final    ANION GAP 6  " 4 - 13 mmol/L Final    BUN 35 (*) 5 - 25 mg/dL Final    Creatinine 1.94 (*) 0.60 - 1.30 mg/dL Final    Comment: Standardized to IDMS reference method    Glucose 95  65 - 140 mg/dL Final    Comment: If the patient is fasting, the ADA then defines impaired fasting glucose as > 100 mg/dL and diabetes as > or equal to 123 mg/dL.    Calcium 9.5  8.4 - 10.2 mg/dL Final    AST 17  13 - 39 U/L Final    ALT 14  7 - 52 U/L Final    Comment: Specimen collection should occur prior to Sulfasalazine administration due to the potential for falsely depressed results.     Alkaline Phosphatase 49  34 - 104 U/L Final    Total Protein 7.1  6.4 - 8.4 g/dL Final    Albumin 4.0  3.5 - 5.0 g/dL Final    Total Bilirubin 0.77  0.20 - 1.00 mg/dL Final    Comment: Use of this assay is not recommended for patients undergoing treatment with eltrombopag due to the potential for falsely elevated results.  N-acetyl-p-benzoquinone imine (metabolite of Acetaminophen) will generate erroneously low results in samples for patients that have taken an overdose of Acetaminophen.    eGFR 32  ml/min/1.73sq m Final    Narrative:     National Kidney Disease Foundation guidelines for Chronic Kidney Disease (CKD):     Stage 1 with normal or high GFR (GFR > 90 mL/min/1.73 square meters)    Stage 2 Mild CKD (GFR = 60-89 mL/min/1.73 square meters)    Stage 3A Moderate CKD (GFR = 45-59 mL/min/1.73 square meters)    Stage 3B Moderate CKD (GFR = 30-44 mL/min/1.73 square meters)    Stage 4 Severe CKD (GFR = 15-29 mL/min/1.73 square meters)    Stage 5 End Stage CKD (GFR <15 mL/min/1.73 square meters)  Note: GFR calculation is accurate only with a steady state creatinine   B-TYPE NATRIURETIC PEPTIDE (BNP) - Abnormal     (*) 0 - 100 pg/mL Final   HS TROPONIN I 0HR - Normal    hs TnI 0hr 9  \"Refer to ACS Flowchart\"- see link ng/L Final    Comment:                                              Initial (time 0) result  If >=50 ng/L, Myocardial injury suggested ;  Type " of myocardial injury and treatment strategy  to be determined.  If 5-49 ng/L, a delta result at 2 hours and or 4 hours will be needed to further evaluate.  If <4 ng/L, and chest pain has been >3 hours since onset, patient may qualify for discharge based on the HEART score in the ED.  If <5 ng/L and <3hours since onset of chest pain, a delta result at 2 hours will be needed to further evaluate.    HS Troponin 99th Percentile URL of a Health Population=12 ng/L with a 95% Confidence Interval of 8-18 ng/L.    Second Troponin (time 2 hours)  If calculated delta >= 20 ng/L,  Myocardial injury suggested ; Type of myocardial injury and treatment strategy to be determined.  If 5-49 ng/L and the calculated delta is 5-19 ng/L, consult medical service for evaluation.  Continue evaluation for ischemia on ecg and other possible etiology and repeat hs troponin at 4 hours.  If delta is <5 ng/L at 2 hours, consider discharge based on risk stratification via the HEART score (if in ED), or RANJITH risk score in IP/Observation.    HS Troponin 99th Percentile URL of a Health Population=12 ng/L with a 95% Confidence Interval of 8-18 ng/L.   LIGHT BLUE TOP     On reassessment there was no change in the above findings.  At this point I suspect the patient is having a slow progressive process, not acute pathology requiring hospitalization or acute treatment.  He is not severely anemic, does have chronic atrial fibrillation, and has pedal edema but I do not believe this is congestive heart failure.  His BNP is elevated but that is relatively nonspecific.  He has no orthopnea and has clear lungs.  His chest x-ray interpreted by me shows no evidence of pneumonia or infiltrate, there is some chronic cardiomegaly, he has no chest pain, has not had a sudden drastic change and I do not believe that this is acute coronary syndrome.  He is anticoagulated,I do not believe this patient's complaints are from pulmonary embolism and I believe they would  most likely be harmed through false positive test results and other complications of testing by further pursuing the diagnosis of pulmonary embolism.Do not believe this is acute aortic dissection.  I believe discharge home with outpatient follow-up with cardiologist is appropriate and the patient is comfortable with this plan.    Critical Care Time  Procedures

## 2024-11-20 NOTE — ED PROVIDER NOTES
Time reflects when diagnosis was documented in both MDM as applicable and the Disposition within this note       Time User Action Codes Description Comment    11/20/2024 12:30 PM Chinedu Milagro Add [R06.09] Dyspnea on exertion     11/20/2024 12:31 PM ChineduMilagro Add [R79.89] Elevated brain natriuretic peptide (BNP) level     11/20/2024 12:31 PM ChineduKayla oconnorcy Add [Z86.79] Hx of aortic valve stenosis           ED Disposition       ED Disposition   Discharge    Condition   Stable    Date/Time   Wed Nov 20, 2024 12:30 PM    Comment   Jamil Martinez discharge to home/self care.                   Assessment & Plan       Medical Decision Making  Amount and/or Complexity of Data Reviewed  External Data Reviewed: labs and radiology.     Details: Echo report   Previous BNP  Labs: ordered. Decision-making details documented in ED Course.  Radiology: ordered and independent interpretation performed.  ECG/medicine tests: ordered and independent interpretation performed.      Patient is a 79 y.o. male   PMH HTN on amlodipine, Afib on pradaxa and atenolol presenting with CC worsening shortness of breath. Patient states he was at his PCP this morning when he mentioned worsening cardiac symptoms (dyspnea on exertion, decreased exercise tolerance, shortness of breath, intermittent dizziness). PCP found this concerning and instructed patient to report to ER for concern for heart failure. Of note, patient has hx bicuspid aortic valve with AS, biatrial dilation, and paroxysmal afib. He follows with cardiology and is aware that the natural progression of his cardiac disease will likely lead to heart failure. He denies any acute worsening of his symptoms over the last days or weeks. He denies any chest pain currently or in the past month.     Last echo 6/3/24: LVEF 55%, normal systolic functino, severe LA and RA dilation, bicuspid aortic valve with moderate stenosis. Mild to moderate MR and TR.    Vital signs stable - normotensive, SpO2  96%, not tachycardic. Exam as listed above    Differential diagnosis includes but is not limited to paroxysmal afib with worsening of symptoms when in Afib, progression to diastolic heart failure, aortic stenosis with pulmonary edema.      Plan random troponin, CBC, BMP, BNP, EKG, CXR.     View ED course above for further discussion on patient workup.     On review of previous records - see review above.    All labs reviewed and utilized in the medical decision making process  All radiology studies independently viewed by me and interpreted by the radiologist.  I reviewed all testing with the patient.     Upon re-evaluation patient remains stable; BNP increased compared to multiple years ago. Trace pleural effusion. Patient is likely experiencing natural progression of his heart disease - recommended outpatient follow up with cardiologist this month and that he may benefit from repeat echo.       ED Course as of 11/21/24 1713   Wed Nov 20, 2024   1048 hs TnI 0hr: 9   1230 BNP(!): 870  Elevated from baseline- likely afib progressing to CHF; will have patient follow up with cardiologist outpatient    1233 Creatinine(!): 1.94  baseline       Medications - No data to display    ED Risk Strat Scores                           SBIRT 22yo+      Flowsheet Row Most Recent Value   Initial Alcohol Screen: US AUDIT-C     1. How often do you have a drink containing alcohol? 0 Filed at: 11/20/2024 1057   2. How many drinks containing alcohol do you have on a typical day you are drinking?  0 Filed at: 11/20/2024 1057   3a. Male UNDER 65: How often do you have five or more drinks on one occasion? 0 Filed at: 11/20/2024 1057   3b. FEMALE Any Age, or MALE 65+: How often do you have 4 or more drinks on one occassion? 0 Filed at: 11/20/2024 1057   Audit-C Score 0 Filed at: 11/20/2024 1057   ROLY: How many times in the past year have you...    Used an illegal drug or used a prescription medication for non-medical reasons? Never Filed at:  11/20/2024 1057                            History of Present Illness       Chief Complaint   Patient presents with    Shortness of Breath     Pt stated that he was at his PCP and told him that he is having symptoms of AFib.  Increased SOB, decreased activity tolerance.  Symptoms have been increasing for the past 6 months.        Past Medical History:   Diagnosis Date    Atrial fibrillation (HCC)     Hypertension       History reviewed. No pertinent surgical history.   Family History   Problem Relation Age of Onset    Alzheimer's disease Mother     Coronary artery disease Father     Heart defect Father         congenital     Stroke Father     Other Father         cerebral hemorrhage       Social History     Tobacco Use    Smoking status: Never    Smokeless tobacco: Never   Vaping Use    Vaping status: Never Used   Substance Use Topics    Alcohol use: Yes     Alcohol/week: 4.0 standard drinks of alcohol     Types: 4 Shots of liquor per week    Drug use: No      E-Cigarette/Vaping    E-Cigarette Use Never User       E-Cigarette/Vaping Substances    Nicotine No     THC No     CBD No     Flavoring No     Other No     Unknown No       I have reviewed and agree with the history as documented.     79 y.o. male presenting with CC progressive shortness of breath         Review of Systems   Constitutional:  Negative for activity change and appetite change.   Respiratory:  Positive for shortness of breath. Negative for apnea.    Cardiovascular:  Positive for palpitations and leg swelling. Negative for chest pain.   Gastrointestinal:  Negative for abdominal distention, nausea and vomiting.   Skin:  Negative for color change.   Neurological:  Negative for dizziness, syncope, weakness and light-headedness.           Objective       ED Triage Vitals   Temperature Pulse Blood Pressure Respirations SpO2 Patient Position - Orthostatic VS   11/20/24 0935 11/20/24 0935 11/20/24 0937 11/20/24 0935 11/20/24 0935 --   (!) 97.2 °F (36.2  °C) 87 140/85 18 96 %       Temp Source Heart Rate Source BP Location FiO2 (%) Pain Score    11/20/24 0935 -- -- -- 11/20/24 0935    Temporal    No Pain      Vitals      Date and Time Temp Pulse SpO2 Resp BP Pain Score FACES Pain Rating User   11/20/24 0937 -- -- -- -- 140/85 -- -- BMM   11/20/24 0935 97.2 °F (36.2 °C) 87 96 % 18 -- No Pain -- BMM            Physical Exam  Constitutional:       Appearance: Normal appearance. He is well-developed.   HENT:      Head: Normocephalic and atraumatic.      Nose: Nose normal.   Eyes:      Pupils: Pupils are equal, round, and reactive to light.   Cardiovascular:      Rate and Rhythm: Normal rate. Rhythm irregular.   Pulmonary:      Effort: Pulmonary effort is normal. No respiratory distress.      Breath sounds: No wheezing, rhonchi or rales.   Abdominal:      General: Abdomen is flat.      Palpations: Abdomen is soft.   Musculoskeletal:      Right lower leg: Edema present.      Left lower leg: Edema present.      Comments: Patient says BLE edema is at his baseline and it is d/t his amlodipine    Skin:     General: Skin is warm and dry.      Capillary Refill: Capillary refill takes less than 2 seconds.   Neurological:      General: No focal deficit present.      Mental Status: He is alert and oriented to person, place, and time.         Results Reviewed       Procedure Component Value Units Date/Time    B-Type Natriuretic Peptide(BNP) [[id number]]  (Abnormal) Collected: 11/20/24 0957    Lab Status: Final result Specimen: Blood from Arm, Left Updated: 11/20/24 1126      pg/mL     HS Troponin 0hr (reflex protocol) [[id number]]  (Normal) Collected: 11/20/24 0957    Lab Status: Final result Specimen: Blood from Arm, Left Updated: 11/20/24 1034     hs TnI 0hr 9 ng/L     Comprehensive metabolic panel [[id number]]  (Abnormal) Collected: 11/20/24 0957    Lab Status: Final result Specimen: Blood from Arm, Left Updated: 11/20/24 1033     Sodium 138 mmol/L      Potassium 4.7 mmol/L       Chloride 109 mmol/L      CO2 23 mmol/L      ANION GAP 6 mmol/L      BUN 35 mg/dL      Creatinine 1.94 mg/dL      Glucose 95 mg/dL      Calcium 9.5 mg/dL      AST 17 U/L      ALT 14 U/L      Alkaline Phosphatase 49 U/L      Total Protein 7.1 g/dL      Albumin 4.0 g/dL      Total Bilirubin 0.77 mg/dL      eGFR 32 ml/min/1.73sq m     Narrative:      National Kidney Disease Foundation guidelines for Chronic Kidney Disease (CKD):     Stage 1 with normal or high GFR (GFR > 90 mL/min/1.73 square meters)    Stage 2 Mild CKD (GFR = 60-89 mL/min/1.73 square meters)    Stage 3A Moderate CKD (GFR = 45-59 mL/min/1.73 square meters)    Stage 3B Moderate CKD (GFR = 30-44 mL/min/1.73 square meters)    Stage 4 Severe CKD (GFR = 15-29 mL/min/1.73 square meters)    Stage 5 End Stage CKD (GFR <15 mL/min/1.73 square meters)  Note: GFR calculation is accurate only with a steady state creatinine    CBC and differential [[id number]]  (Abnormal) Collected: 11/20/24 0957    Lab Status: Final result Specimen: Blood from Arm, Left Updated: 11/20/24 1012     WBC 4.48 Thousand/uL      RBC 4.13 Million/uL      Hemoglobin 14.2 g/dL      Hematocrit 41.3 %       fL      MCH 34.4 pg      MCHC 34.4 g/dL      RDW 12.8 %      MPV 8.9 fL      Platelets 204 Thousands/uL      nRBC 0 /100 WBCs      Segmented % 73 %      Immature Grans % 0 %      Lymphocytes % 13 %      Monocytes % 9 %      Eosinophils Relative 4 %      Basophils Relative 1 %      Absolute Neutrophils 3.27 Thousands/µL      Absolute Immature Grans 0.01 Thousand/uL      Absolute Lymphocytes 0.60 Thousands/µL      Absolute Monocytes 0.41 Thousand/µL      Eosinophils Absolute 0.16 Thousand/µL      Basophils Absolute 0.03 Thousands/µL             XR chest 1 view portable   ED Interpretation by Milagro Che MD (11/20 0977)   Trace L pleural effusion. No diffuse pulmonary edema concerning for CHF exacerbation       Final Interpretation by Sachin Coelho MD (11/20 5273)       Cardiomegaly with possible small left effusion..            Workstation performed: [id number]             ECG 12 Lead Documentation Only    Date/Time: 11/20/2024 12:32 PM    Performed by: Milagro Che MD  Authorized by: Milagro Che MD    ECG reviewed by me, the ED Provider: yes    Patient location:  ED  Previous ECG:     Comparison to cardiac monitor: Yes    Interpretation:     Interpretation: normal    Rate:     ECG rate assessment: normal    Rhythm:     Rhythm: atrial fibrillation    Ectopy:     Ectopy: none    QRS:     QRS axis:  Right  Conduction:     Conduction: abnormal      Abnormal conduction: incomplete RBBB    ST segments:     ST segments:  Normal  T waves:     T waves: normal    Comments:      T wave inversion in inferior leads-  seen on prior EKG      ED Medication and Procedure Management   Prior to Admission Medications   Prescriptions Last Dose Informant Patient Reported? Taking?   Ascorbic Acid (VITAMIN C) 500 MG/5ML LIQD 11/20/2024 Morning Self Yes Yes   Sig: Take 1 tablet by mouth daily   Cholecalciferol (VITAMIN D) 2000 units tablet 11/20/2024 Morning Self Yes Yes   Sig: Take 2,000 Units by mouth daily   amLODIPine (NORVASC) 10 mg tablet 11/20/2024 Morning Self No Yes   Sig: take 1 tablet by mouth twice a day   atenolol (TENORMIN) 25 mg tablet 11/20/2024 Morning Self No Yes   Sig: take 2 tablets by mouth IN THE MORNING AND 1 TABLET IN THE EVENING   atorvastatin (LIPITOR) 20 mg tablet 11/20/2024 Morning Self No Yes   Sig: take 1 tablet by mouth once daily   cloNIDine (CATAPRES) 0.1 mg tablet 11/20/2024 Morning Self No Yes   Sig: take 1 tablet by mouth every 12 hours   dabigatran etexilate (PRADAXA) 150 mg capsu 11/20/2024 Morning Self No Yes   Sig: take 1 capsule by mouth twice a day      Facility-Administered Medications: None     Discharge Medication List as of 11/20/2024 12:37 PM        CONTINUE these medications which have NOT CHANGED    Details   amLODIPine (NORVASC) 10 mg tablet take 1  tablet by mouth twice a day, Normal      Ascorbic Acid (VITAMIN C) 500 MG/5ML LIQD Take 1 tablet by mouth daily, Historical Med      atenolol (TENORMIN) 25 mg tablet take 2 tablets by mouth IN THE MORNING AND 1 TABLET IN THE EVENING, Normal      atorvastatin (LIPITOR) 20 mg tablet take 1 tablet by mouth once daily, Normal      Cholecalciferol (VITAMIN D) 2000 units tablet Take 2,000 Units by mouth daily, Historical Med      cloNIDine (CATAPRES) 0.1 mg tablet take 1 tablet by mouth every 12 hours, Normal      dabigatran etexilate (PRADAXA) 150 mg capsu take 1 capsule by mouth twice a day, Starting Wed 9/18/2024, Normal           No discharge procedures on file.  ED SEPSIS DOCUMENTATION   Time reflects when diagnosis was documented in both MDM as applicable and the Disposition within this note       Time User Action Codes Description Comment    11/20/2024 12:30 PM Milagro Che [R06.09] Dyspnea on exertion     11/20/2024 12:31 PM Milagro Che [R79.89] Elevated brain natriuretic peptide (BNP) level     11/20/2024 12:31 PM Milagro Che [Z86.79] Hx of aortic valve stenosis                  Milagro Che MD  11/21/24 3064

## 2024-11-20 NOTE — PROGRESS NOTES
"Name: Jamil Martinez      : 1945      MRN: [id number]  Encounter Provider: Gopal Baumann DO  Encounter Date: 2024   Encounter department: [facility]  :  Assessment & Plan  Short of breath on exertion    Orders:    POCT ECG    Transfer to other facility    Weakness of both lower extremities    Orders:    Transfer to other facility    Shortness of breath at rest    Orders:    Transfer to other facility    Abnormal EKG    Orders:    Transfer to other facility    Persistent atrial fibrillation (HCC)         Benign hypertension with CKD (chronic kidney disease) stage III (HCC)  Lab Results   Component Value Date    EGFR 34 2024    EGFR 33 02/15/2024    EGFR 33 2022    CREATININE 1.87 (H) 2024    CREATININE 1.88 (H) 02/15/2024    CREATININE 1.89 (H) 2022            Secondary hyperparathyroidism of renal origin (HCC)         Bilateral leg edema           ER evaluation - [place].    Depression Screening and Follow-up Plan: Patient was screened for depression during today's encounter. They screened negative with a PHQ-2 score of 0.      Chief Complaint   Patient presents with    Hypertension      History of Present Illness     Labs and refills.  C/O SOB walking short distances.  One flight of steps is difficult.   BL leg weakness.  Progressive problem's past 6 months.  PE:   SOB at rest.      Review of Systems   Constitutional: Negative.    HENT: Negative.     Eyes: Negative.    Respiratory:  Positive for shortness of breath.    Cardiovascular: Negative.    Gastrointestinal: Negative.    Genitourinary: Negative.    Musculoskeletal: Negative.    Skin: Negative.    Neurological:  Positive for weakness.   Psychiatric/Behavioral: Negative.            Objective   /88 (BP Location: Left arm, Patient Position: Sitting, Cuff Size: Standard)   Pulse 98   Temp 97.5 °F (36.4 °C) (Temporal)   Resp 18   Ht 5' 4\" (1.626 m)   Wt 71.8 kg (158 lb 6.4 oz)   SpO2 97%   BMI " 27.19 kg/m²    BMI Counseling: Body mass index is 27.19 kg/m². The BMI is above normal. Nutrition recommendations include reducing portion sizes, consuming healthier snacks, and moderation in carbohydrate intake.  Physical Exam  Constitutional:       Appearance: He is well-developed.   HENT:      Head: Normocephalic and atraumatic.      Right Ear: External ear normal.      Left Ear: External ear normal.      Nose: Nose normal.   Eyes:      Conjunctiva/sclera: Conjunctivae normal.      Pupils: Pupils are equal, round, and reactive to light.   Cardiovascular:      Rate and Rhythm: Normal rate. Rhythm irregular.      Heart sounds: Normal heart sounds.   Pulmonary:      Effort: Pulmonary effort is normal.      Breath sounds: Normal breath sounds.   Musculoskeletal:      Cervical back: Normal range of motion and neck supple.   Skin:     General: Skin is warm and dry.   Neurological:      Mental Status: He is alert and oriented to person, place, and time.      Deep Tendon Reflexes: Reflexes are normal and symmetric.   Psychiatric:         Mood and Affect: Mood normal.         Behavior: Behavior normal.         Thought Content: Thought content normal.         Judgment: Judgment normal.

## 2024-11-20 NOTE — DISCHARGE INSTRUCTIONS
You were seen today for shortness of breath on exertion. Your blood work was largely unremarkable. You chest xray should a small amount of fluid at the base of the left lung, and your BNP was elevated compared to your baseline, which can indicate congestive heart failure or worsening atrial dilation (atrial dilation was seen on your previous echocardiogram). There were no emergent findings. Please follow up with your cardiologist this month regarding your worsening symptoms as you would benefit from a echocardiogram to assess for progression of your cardiac disease.    Return to the ER if you have suddenly worsening shortness of breath, syncope (passing out), or chest pain.

## 2024-11-20 NOTE — ASSESSMENT & PLAN NOTE
Lab Results   Component Value Date    EGFR 34 03/05/2024    EGFR 33 02/15/2024    EGFR 33 04/19/2022    CREATININE 1.87 (H) 03/05/2024    CREATININE 1.88 (H) 02/15/2024    CREATININE 1.89 (H) 04/19/2022

## 2024-11-21 ENCOUNTER — VBI (OUTPATIENT)
Dept: FAMILY MEDICINE CLINIC | Facility: CLINIC | Age: 79
End: 2024-11-21

## 2024-11-21 NOTE — TELEPHONE ENCOUNTER
11/21/24 2:54 PM    Patient contacted post ED visit, [facility] department spoke with patient/caregiver and outreach was successful.    Thank you.  Thien Milner MA  [facility]

## 2024-12-24 NOTE — PROGRESS NOTES
Cardiology Follow Up    Jamil Martinez  1945  [id number]  [facility] [place]  [address]  [place] [id number] [phone]    1. Essential hypertension, malignant  Echo complete w/ contrast if indicated      2. Atrial fibrillation, unspecified type (HCC)  Echo complete w/ contrast if indicated      3. Hyperlipidemia, unspecified hyperlipidemia type  Echo complete w/ contrast if indicated      4. Aortic valve disease  Echo complete w/ contrast if indicated      5. Stage 4 chronic kidney disease (HCC)        6. 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 Other cardiomyopathy (HCC)          Interval History: Patient is here for FU.  Patient has Afib on rate control and Pradaxa.  He has HTN, HLD and bicuspid AVD with  AS.   He is being followed by Nephrology.  He is felt to have CRI in reference to long-term HTN.  Lipid profile 2/2024 demonstrated TC of 151 with an HDL of 86 and a calculated LDL of 52.  Echo 5/2023 demonstrated preserved LV systolic function with LVEF of 65%.  There was LVH.  A bicuspid AV with moderate AS and a mean gradient of 23 mmHg was noted.  Peak velocity across the AV was 3.06 m/s.  There is mild MR/ TR with a mild elevation in PAP.  There was JOSLYN and mild dilatation of the ascending aorta at 3.8 cm. Patient has had issues with lower extremity edema.  His blood pressure is low normal today.  Will reduce the dose of Amlodipine.  Echocardiogram 6/3/2024 demonstrated LVEF of 55% with moderate LVH.  There was severe JOSLYN.  BAV noted.  There was moderate to severe AS with mean gradient of 21 mmHg.  Peak velocity across the AV is 2.8 m/s.  There was mild to moderate MR and moderate TR with elevated PASP.  Aortic root was 4.3 cm.  There was no change compared to 5/31/2023.  He has had no chest pain or significant dyspnea.  His HTN and A-fib are stable on his current  medicine.      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)     Past Medical History:   Diagnosis Date   • Atrial fibrillation (HCC)    • Hypertension      Social History     Socioeconomic History   • Marital status: Single     Spouse name: Not on file   • Number of children: Not on file   • Years of education: Not on file   • Highest education level: Not on file   Occupational History   • Occupation: Construction   Tobacco Use   • Smoking status: Never   • Smokeless tobacco: Never   Vaping Use   • Vaping status: Never Used   Substance and Sexual Activity   • Alcohol use: Yes     Alcohol/week: 4.0 standard drinks of alcohol     Types: 4 Shots of liquor per week   • Drug use: No   • Sexual activity: Not Currently   Other Topics Concern   • Not on file   Social History Narrative   • Not on file     Social Drivers of Health     Financial Resource Strain: Low Risk  (2/23/2024)    Overall Financial Resource Strain (CARDIA)    • Difficulty of Paying Living Expenses: Not hard at all   Food Insecurity: Not on file   Transportation Needs: No Transportation Needs (2/23/2024)    PRAPARE - Transportation    • Lack of Transportation (Medical): No    • Lack of Transportation (Non-Medical): No   Physical Activity: Not on file   Stress: Not on file   Social Connections: Unknown (6/18/2024)    Received from [employer]    Social Connections    • How often do you feel lonely or isolated from those around you? (Adult - for ages 18 years and over): Not on file   Intimate Partner Violence: Not on file   Housing Stability: Not on file      Family  "History   Problem Relation Age of Onset   • Alzheimer's disease Mother    • Coronary artery disease Father    • Heart defect Father         congenital    • Stroke Father    • Other Father         cerebral hemorrhage      No past surgical history on file.    Current Outpatient Medications:   •  amLODIPine (NORVASC) 10 mg tablet, take 1 tablet by mouth twice a day, Disp: 180 tablet, Rfl: 1  •  Ascorbic Acid (VITAMIN C) 500 MG/5ML LIQD, Take 1 tablet by mouth daily, Disp: , Rfl:   •  atenolol (TENORMIN) 25 mg tablet, take 2 tablets by mouth IN THE MORNING AND 1 TABLET IN THE EVENING, Disp: 270 tablet, Rfl: 1  •  atorvastatin (LIPITOR) 20 mg tablet, take 1 tablet by mouth once daily, Disp: 90 tablet, Rfl: 1  •  Cholecalciferol (VITAMIN D) 2000 units tablet, Take 2,000 Units by mouth daily, Disp: , Rfl:   •  cloNIDine (CATAPRES) 0.1 mg tablet, take 1 tablet by mouth every 12 hours, Disp: 180 tablet, Rfl: 3  •  dabigatran etexilate (PRADAXA) 150 mg capsu, take 1 capsule by mouth twice a day, Disp: 180 capsule, Rfl: 1  Allergies   Allergen Reactions   • Marijuana (Cannabis Sativa) Swelling       Labs:not applicable  Imaging: No results found.    Review of Systems:  Review of Systems   All other systems reviewed and are negative.      Physical Exam:  /74 (BP Location: Left arm, Patient Position: Sitting, Cuff Size: Standard)   Pulse 90   Ht 5' 4\" (1.626 m)   Wt 72.5 kg (159 lb 12.8 oz)   SpO2 97%   BMI 27.43 kg/m²   Physical Exam  Vitals reviewed.   Constitutional:       Appearance: He is well-developed.   HENT:      Head: Normocephalic and atraumatic.   Cardiovascular:      Rate and Rhythm: Normal rate.      Heart sounds: Murmur heard.   Pulmonary:      Effort: Pulmonary effort is normal.      Breath sounds: Normal breath sounds.   Musculoskeletal:      Cervical back: Normal range of motion.      Right lower leg: Edema present.      Left lower leg: Edema present.   Skin:     General: Skin is warm and dry. "   Neurological:      Mental Status: He is alert and oriented to person, place, and time.         Discussion/Summary: We will reduce dose of amlodipine to 5 mg twice a day.  He will monitor his blood pressure and his edema and call me back if there is an issue.  I have asked him to call if there is a problem in the interim otherwise I will see him in follow-up in 1 year and sooner as is necessary.  We will check an echo prior to that visit.

## 2025-01-03 ENCOUNTER — OFFICE VISIT (OUTPATIENT)
Dept: CARDIOLOGY CLINIC | Facility: CLINIC | Age: 80
End: 2025-01-03
Payer: COMMERCIAL

## 2025-01-03 VITALS
SYSTOLIC BLOOD PRESSURE: 112 MMHG | OXYGEN SATURATION: 97 % | HEART RATE: 90 BPM | WEIGHT: 159.8 LBS | DIASTOLIC BLOOD PRESSURE: 74 MMHG | BODY MASS INDEX: 27.28 KG/M2 | HEIGHT: 64 IN

## 2025-01-03 DIAGNOSIS — I42.8 OTHER CARDIOMYOPATHY (HCC): ICD-10-CM

## 2025-01-03 DIAGNOSIS — L97.919 CHRONIC VENOUS HYPERTENSION (IDIOPATHIC) WITH ULCER OF RIGHT LOWER EXTREMITY (HCC): ICD-10-CM

## 2025-01-03 DIAGNOSIS — I87.311 CHRONIC VENOUS HYPERTENSION (IDIOPATHIC) WITH ULCER OF RIGHT LOWER EXTREMITY (HCC): ICD-10-CM

## 2025-01-03 DIAGNOSIS — I10 ESSENTIAL HYPERTENSION, MALIGNANT: Primary | ICD-10-CM

## 2025-01-03 DIAGNOSIS — N18.4 STAGE 4 CHRONIC KIDNEY DISEASE (HCC): ICD-10-CM

## 2025-01-03 DIAGNOSIS — E78.5 HYPERLIPIDEMIA, UNSPECIFIED HYPERLIPIDEMIA TYPE: ICD-10-CM

## 2025-01-03 DIAGNOSIS — I48.91 ATRIAL FIBRILLATION, UNSPECIFIED TYPE (HCC): ICD-10-CM

## 2025-01-03 DIAGNOSIS — I35.9 AORTIC VALVE DISEASE: ICD-10-CM

## 2025-01-03 PROCEDURE — 99214 OFFICE O/P EST MOD 30 MIN: CPT | Performed by: INTERNAL MEDICINE

## 2025-01-03 NOTE — PATIENT INSTRUCTIONS
Please reduce dose of amlodipine to 5 mg twice a day.  Please watch your salt intake.  Please call if there is a problem.  I will order an echo to be done prior to your next visit.

## 2025-02-27 DIAGNOSIS — I48.91 ATRIAL FIBRILLATION, UNSPECIFIED TYPE (HCC): ICD-10-CM

## 2025-02-27 RX ORDER — DABIGATRAN ETEXILATE 75 MG/1
75 CAPSULE ORAL 2 TIMES DAILY
Qty: 180 CAPSULE | Refills: 3 | Status: SHIPPED | OUTPATIENT
Start: 2025-02-27

## 2025-02-27 NOTE — TELEPHONE ENCOUNTER
From: David Sue MD  Sent: 2/26/2025   7:49 AM EST  To: Isa Kelly MA    Please call patient.  Received notification from pharmacy to reduce dose of Pradaxa because of patient's renal insufficiency.  Ask him to discontinue Pradaxa 150 mg BID and take Pradaxa 75 mg BID.  Please provide prescription for this. Its a capsule and he can't cut in half, tx

## 2025-02-27 NOTE — TELEPHONE ENCOUNTER
LVM for pt to call back in regards to his pradaxa. Will send updated scrip to the [facility] pharmacy on [facility].

## 2025-02-27 NOTE — TELEPHONE ENCOUNTER
Patient returned call, relayed previous message from Dr. Sue. Patient verbalized understanding, he will  prescription today.

## 2025-03-12 DIAGNOSIS — E78.5 HYPERLIPIDEMIA, UNSPECIFIED HYPERLIPIDEMIA TYPE: ICD-10-CM

## 2025-03-12 DIAGNOSIS — I10 ESSENTIAL (PRIMARY) HYPERTENSION: ICD-10-CM

## 2025-03-13 RX ORDER — CLONIDINE HYDROCHLORIDE 0.1 MG/1
0.1 TABLET ORAL EVERY 12 HOURS
Qty: 180 TABLET | Refills: 1 | Status: SHIPPED | OUTPATIENT
Start: 2025-03-13

## 2025-03-13 RX ORDER — ATORVASTATIN CALCIUM 20 MG/1
20 TABLET, FILM COATED ORAL DAILY
Qty: 30 TABLET | Refills: 0 | Status: SHIPPED | OUTPATIENT
Start: 2025-03-13

## 2025-03-22 DIAGNOSIS — I10 ESSENTIAL (PRIMARY) HYPERTENSION: ICD-10-CM

## 2025-03-24 RX ORDER — ATENOLOL 25 MG/1
TABLET ORAL
Qty: 270 TABLET | Refills: 1 | Status: SHIPPED | OUTPATIENT
Start: 2025-03-24

## 2025-04-01 ENCOUNTER — TELEPHONE (OUTPATIENT)
Dept: CARDIOLOGY CLINIC | Facility: CLINIC | Age: 80
End: 2025-04-01

## 2025-04-01 NOTE — TELEPHONE ENCOUNTER
Patient is with an ECHO order in place. Call placed to facilitate scheduling for same. No answer, vm left. Patient is to call [phone] for scheduling, echo should be dameon after 6/3/2025.

## 2025-04-11 DIAGNOSIS — E78.5 HYPERLIPIDEMIA, UNSPECIFIED HYPERLIPIDEMIA TYPE: ICD-10-CM

## 2025-04-11 RX ORDER — ATORVASTATIN CALCIUM 20 MG/1
20 TABLET, FILM COATED ORAL DAILY
Qty: 30 TABLET | Refills: 0 | Status: SHIPPED | OUTPATIENT
Start: 2025-04-11 | End: 2025-04-11 | Stop reason: SDUPTHER

## 2025-04-11 RX ORDER — ATORVASTATIN CALCIUM 20 MG/1
20 TABLET, FILM COATED ORAL DAILY
Qty: 100 TABLET | Refills: 0 | Status: SHIPPED | OUTPATIENT
Start: 2025-04-11

## 2025-04-11 NOTE — TELEPHONE ENCOUNTER
Refill must be reviewed and completed by the office or provider. The refill is unable to be approved or denied by the medication management team.    Courtesy refill previously given 03.2025, patient need updated lipid panel

## 2025-05-04 DIAGNOSIS — I10 ESSENTIAL HYPERTENSION, MALIGNANT: ICD-10-CM

## 2025-05-05 RX ORDER — AMLODIPINE BESYLATE 10 MG/1
10 TABLET ORAL 2 TIMES DAILY
Qty: 180 TABLET | Refills: 1 | Status: SHIPPED | OUTPATIENT
Start: 2025-05-05

## 2025-05-13 ENCOUNTER — TELEPHONE (OUTPATIENT)
Age: 80
End: 2025-05-13

## 2025-06-05 ENCOUNTER — HOSPITAL ENCOUNTER (OUTPATIENT)
Dept: NON INVASIVE DIAGNOSTICS | Facility: CLINIC | Age: 80
Discharge: HOME | End: 2025-06-05
Payer: COMMERCIAL

## 2025-06-05 VITALS
SYSTOLIC BLOOD PRESSURE: 112 MMHG | DIASTOLIC BLOOD PRESSURE: 74 MMHG | HEART RATE: 90 BPM | BODY MASS INDEX: 27.14 KG/M2 | WEIGHT: 159 LBS | HEIGHT: 64 IN

## 2025-06-05 DIAGNOSIS — I35.9 AORTIC VALVE DISEASE: ICD-10-CM

## 2025-06-05 DIAGNOSIS — E78.5 HYPERLIPIDEMIA, UNSPECIFIED HYPERLIPIDEMIA TYPE: ICD-10-CM

## 2025-06-05 DIAGNOSIS — I10 ESSENTIAL HYPERTENSION, MALIGNANT: ICD-10-CM

## 2025-06-05 DIAGNOSIS — I48.91 ATRIAL FIBRILLATION, UNSPECIFIED TYPE (HCC): ICD-10-CM

## 2025-06-05 LAB
AORTIC ROOT: 3.6 CM
AORTIC VALVE MEAN VELOCITY: 22.3 M/S
ASCENDING AORTA: 4.2 CM
AV AREA BY CONTINUOUS VTI: 0.9 CM2
AV AREA PEAK VELOCITY: 0.9 CM2
AV LVOT MEAN GRADIENT: 1 MMHG
AV LVOT PEAK GRADIENT: 1 MMHG
AV MEAN PRESS GRAD SYS DOP V1V2: 22 MMHG
AV ORIFICE AREA US: 0.92 CM2
AV PEAK GRADIENT: 35 MMHG
AV VELOCITY RATIO: 0.17
AV VMAX SYS DOP: 2.96 M/S
BSA FOR ECHO PROCEDURE: 1.77 M2
DOP CALC AO VTI: 58.81 CM
DOP CALC LVOT AREA: 5.31 CM2
DOP CALC LVOT CARDIAC INDEX: 2.67 L/MIN/M2
DOP CALC LVOT CARDIAC OUTPUT: 4.75 L/MIN
DOP CALC LVOT DIAMETER: 2.6 CM
DOP CALC LVOT PEAK VEL VTI: 10.23 CM
DOP CALC LVOT PEAK VEL: 0.5 M/S
DOP CALC LVOT STROKE INDEX: 30.3 ML/M2
DOP CALC LVOT STROKE VOLUME: 54
E WAVE DECELERATION TIME: 147 MS
E/A RATIO: 76
FRACTIONAL SHORTENING: 29 (ref 28–44)
INTERVENTRICULAR SEPTUM IN DIASTOLE (PARASTERNAL SHORT AXIS VIEW): 1.7 CM
INTERVENTRICULAR SEPTUM: 1.7 CM (ref 0.6–1.1)
LAAS-AP2: 41.1 CM2
LAAS-AP4: 40.5 CM2
LEFT ATRIUM SIZE: 6 CM
LEFT ATRIUM VOLUME (MOD BIPLANE): 177 ML
LEFT ATRIUM VOLUME INDEX (MOD BIPLANE): 99.4 ML/M2
LEFT INTERNAL DIMENSION IN SYSTOLE: 3.2 CM (ref 2.1–4)
LEFT VENTRICULAR INTERNAL DIMENSION IN DIASTOLE: 4.5 CM (ref 3.5–6)
LEFT VENTRICULAR POSTERIOR WALL IN END DIASTOLE: 1.6 CM
LEFT VENTRICULAR STROKE VOLUME: 50 ML
LV EF US.2D.A4C+ESTIMATED: 49 %
LVSV (TEICH): 50 ML
MV PEAK A VEL: 0.01 M/S
MV PEAK E VEL: 76 CM/S
MV STENOSIS PRESSURE HALF TIME: 43 MS
MV VALVE AREA P 1/2 METHOD: 5.1
RIGHT ATRIUM AREA SYSTOLE A4C: 33 CM2
RIGHT VENTRICLE ID DIMENSION: 4.5 CM
SL CV LEFT ATRIUM LENGTH A2C: 7.3 CM
SL CV LV EF: 55
SL CV PED ECHO LEFT VENTRICLE DIASTOLIC VOLUME (MOD BIPLANE) 2D: 90 ML
SL CV PED ECHO LEFT VENTRICLE SYSTOLIC VOLUME (MOD BIPLANE) 2D: 40 ML
TR MAX PG: 56 MMHG
TR PEAK VELOCITY: 3.8 M/S
TRICUSPID ANNULAR PLANE SYSTOLIC EXCURSION: 1.8 CM
TRICUSPID VALVE PEAK REGURGITATION VELOCITY: 3.75 M/S

## 2025-06-05 PROCEDURE — 93306 TTE W/DOPPLER COMPLETE: CPT | Performed by: INTERNAL MEDICINE

## 2025-06-05 PROCEDURE — 93306 TTE W/DOPPLER COMPLETE: CPT

## 2025-07-09 DIAGNOSIS — E78.5 HYPERLIPIDEMIA, UNSPECIFIED HYPERLIPIDEMIA TYPE: ICD-10-CM

## 2025-07-10 RX ORDER — ATORVASTATIN CALCIUM 20 MG/1
20 TABLET, FILM COATED ORAL DAILY
Qty: 90 TABLET | Refills: 3 | Status: SHIPPED | OUTPATIENT
Start: 2025-07-10